# Patient Record
Sex: MALE | Race: WHITE | NOT HISPANIC OR LATINO | Employment: STUDENT | URBAN - METROPOLITAN AREA
[De-identification: names, ages, dates, MRNs, and addresses within clinical notes are randomized per-mention and may not be internally consistent; named-entity substitution may affect disease eponyms.]

---

## 2017-08-01 DIAGNOSIS — R94.5 ABNORMAL RESULTS OF LIVER FUNCTION STUDIES: Primary | ICD-10-CM

## 2017-08-08 ENCOUNTER — TRANSFERRED RECORDS (OUTPATIENT)
Dept: HEALTH INFORMATION MANAGEMENT | Facility: CLINIC | Age: 27
End: 2017-08-08

## 2017-08-11 ENCOUNTER — OFFICE VISIT (OUTPATIENT)
Dept: GASTROENTEROLOGY | Facility: CLINIC | Age: 27
End: 2017-08-11
Attending: INTERNAL MEDICINE
Payer: COMMERCIAL

## 2017-08-11 VITALS
DIASTOLIC BLOOD PRESSURE: 83 MMHG | WEIGHT: 195.3 LBS | BODY MASS INDEX: 24.28 KG/M2 | RESPIRATION RATE: 20 BRPM | TEMPERATURE: 97.9 F | OXYGEN SATURATION: 100 % | HEART RATE: 87 BPM | SYSTOLIC BLOOD PRESSURE: 142 MMHG | HEIGHT: 75 IN

## 2017-08-11 DIAGNOSIS — K52.9 IBD (INFLAMMATORY BOWEL DISEASE): ICD-10-CM

## 2017-08-11 DIAGNOSIS — K83.01 PSC (PRIMARY SCLEROSING CHOLANGITIS) (H): ICD-10-CM

## 2017-08-11 DIAGNOSIS — K52.9 IBD (INFLAMMATORY BOWEL DISEASE): Primary | ICD-10-CM

## 2017-08-11 PROBLEM — K90.0 CELIAC DISEASE: Status: ACTIVE | Noted: 2017-08-11

## 2017-08-11 LAB
ALBUMIN SERPL-MCNC: 3.8 G/DL (ref 3.4–5)
ALP SERPL-CCNC: 361 U/L (ref 40–150)
ALT SERPL W P-5'-P-CCNC: 210 U/L (ref 0–70)
ANION GAP SERPL CALCULATED.3IONS-SCNC: 6 MMOL/L (ref 3–14)
AST SERPL W P-5'-P-CCNC: 64 U/L (ref 0–45)
BILIRUB DIRECT SERPL-MCNC: 0.2 MG/DL (ref 0–0.2)
BILIRUB SERPL-MCNC: 0.8 MG/DL (ref 0.2–1.3)
BUN SERPL-MCNC: 9 MG/DL (ref 7–30)
CALCIUM SERPL-MCNC: 9.4 MG/DL (ref 8.5–10.1)
CHLORIDE SERPL-SCNC: 103 MMOL/L (ref 94–109)
CO2 SERPL-SCNC: 30 MMOL/L (ref 20–32)
CREAT SERPL-MCNC: 1.06 MG/DL (ref 0.66–1.25)
CRP SERPL-MCNC: <2.9 MG/L (ref 0–8)
DEPRECATED CALCIDIOL+CALCIFEROL SERPL-MC: 25 UG/L (ref 20–75)
ERYTHROCYTE [DISTWIDTH] IN BLOOD BY AUTOMATED COUNT: 12.9 % (ref 10–15)
GFR SERPL CREATININE-BSD FRML MDRD: 85 ML/MIN/1.7M2
GLUCOSE SERPL-MCNC: 70 MG/DL (ref 70–99)
HCT VFR BLD AUTO: 45.5 % (ref 40–53)
HGB BLD-MCNC: 14.4 G/DL (ref 13.3–17.7)
INR PPP: 0.99 (ref 0.86–1.14)
MCH RBC QN AUTO: 28.2 PG (ref 26.5–33)
MCHC RBC AUTO-ENTMCNC: 31.6 G/DL (ref 31.5–36.5)
MCV RBC AUTO: 89 FL (ref 78–100)
PLATELET # BLD AUTO: 251 10E9/L (ref 150–450)
POTASSIUM SERPL-SCNC: 3.9 MMOL/L (ref 3.4–5.3)
PROT SERPL-MCNC: 8 G/DL (ref 6.8–8.8)
RBC # BLD AUTO: 5.1 10E12/L (ref 4.4–5.9)
SODIUM SERPL-SCNC: 139 MMOL/L (ref 133–144)
WBC # BLD AUTO: 6.1 10E9/L (ref 4–11)

## 2017-08-11 PROCEDURE — 86140 C-REACTIVE PROTEIN: CPT | Performed by: INTERNAL MEDICINE

## 2017-08-11 PROCEDURE — 80076 HEPATIC FUNCTION PANEL: CPT | Performed by: INTERNAL MEDICINE

## 2017-08-11 PROCEDURE — 36415 COLL VENOUS BLD VENIPUNCTURE: CPT | Performed by: INTERNAL MEDICINE

## 2017-08-11 PROCEDURE — 80048 BASIC METABOLIC PNL TOTAL CA: CPT | Performed by: INTERNAL MEDICINE

## 2017-08-11 PROCEDURE — 85610 PROTHROMBIN TIME: CPT | Performed by: INTERNAL MEDICINE

## 2017-08-11 PROCEDURE — 99212 OFFICE O/P EST SF 10 MIN: CPT | Mod: ZF

## 2017-08-11 PROCEDURE — 82306 VITAMIN D 25 HYDROXY: CPT | Performed by: INTERNAL MEDICINE

## 2017-08-11 PROCEDURE — 85027 COMPLETE CBC AUTOMATED: CPT | Performed by: INTERNAL MEDICINE

## 2017-08-11 ASSESSMENT — PAIN SCALES - GENERAL: PAINLEVEL: NO PAIN (0)

## 2017-08-11 NOTE — MR AVS SNAPSHOT
After Visit Summary   8/11/2017    Uriah Mortensen    MRN: [id number]           Patient Information     Date Of Birth          1990        Visit Information        Provider Department      8/11/2017 8:50 AM Graciela Herrera MD [facility] Hepatology        Today's Diagnoses     IBD (inflammatory bowel disease)    -  1    PSC (primary sclerosing cholangitis)           Follow-ups after your visit        Follow-up notes from your care team     Return in about 1 year (around 8/11/2018).      Your next 10 appointments already scheduled     Aug 11, 2017 10:15 AM CDT   Lab with [facility]   [facility] Lab ([facility])    [address] [place] [address]  1st [facility] [id number] [phone]            Aug 10, 2018  9:10 AM CDT   (Arrive by 8:55 AM)   Return General Liver with Graciela Herrera MD   [facility] Hepatology ([facility])    [address] [place] [address]  3rd [facility] [address] [phone]              Future tests that were ordered for you today     Open Future Orders        Priority Expected Expires Ordered    Vitamin D Deficiency Routine  9/10/2017 8/11/2017    CRP inflammation Routine  9/10/2017 8/11/2017    INR Routine  9/10/2017 8/11/2017    Hepatic panel Routine  9/10/2017 8/11/2017    Basic metabolic panel Routine  9/10/2017 8/11/2017    CBC with platelets Routine  9/10/2017 8/11/2017            Who to contact     If you have questions or need follow up information about today's clinic visit or your schedule please contact [facility] HEPATOLOGY directly at [phone].  Normal or non-critical lab and imaging results will be communicated to you by MyChart, letter or phone within 4 business days after the clinic has received the results. If you do not hear from us within 7 days, please contact the clinic through MyChart or phone. If you have a critical or abnormal lab result, we will notify you by phone as soon as  "possible.  Submit refill requests through MyWishBoard or call your pharmacy and they will forward the refill request to us. Please allow 3 business days for your refill to be completed.          Additional Information About Your Visit        [facility] Information     MyWishBoard gives you secure access to your electronic health record. If you see a primary care provider, you can also send messages to your care team and make appointments. If you have questions, please call your primary care clinic.  If you do not have a primary care provider, please call [phone] and they will assist you.        Care EveryWhere ID     This is your Care EveryWhere ID. This could be used by other organizations to access your [facility] medical records  [id number]        Your Vitals Were     Pulse Temperature Respirations Height Pulse Oximetry BMI (Body Mass Index)    87 97.9  F (36.6  C) (Oral) 20 1.905 m (6' 3\") 100% 24.41 kg/m2       Blood Pressure from Last 3 Encounters:   08/11/17 142/83   08/05/16 133/88    Weight from Last 3 Encounters:   08/11/17 88.6 kg (195 lb 4.8 oz)   08/05/16 83 kg (183 lb)               Primary Care Provider    Physician No Ref-Primary       No address on file        Equal Access to Services     SHEEBA QUEEN : Hadii anthony rosaleso Sodeisiali, waaxda luqadaha, qaybta kaalmada adeegyada, quintin jonas. So [facility] [phone].    ATENCIÓN: Si habla español, tiene a germain disposición servicios gratuitos de asistencia lingüística. Llame al [phone].    We comply with applicable federal civil rights laws and [place] laws. We do not discriminate on the basis of race, color, national origin, age, disability sex, sexual orientation or gender identity.            Thank you!     Thank you for choosing [facility] HEPATOLOGY  for your care. Our goal is always to provide you with excellent care. Hearing back from our patients is one way we can continue to improve our services. Please take a few " minutes to complete the written survey that you may receive in the mail after your visit with us. Thank you!             Your Updated Medication List - Protect others around you: Learn how to safely use, store and throw away your medicines at www.disposemymeds.org.          This list is accurate as of: 8/11/17  9:33 AM.  Always use your most recent med list.                   Brand Name Dispense Instructions for use Diagnosis    ASACOL  MG EC tablet   Generic drug:  mesalamine      Take 1,600 mg by mouth every morning    PSC (primary sclerosing cholangitis)       IBUPROFEN PO      Take 400 mg by mouth as needed for moderate pain (Mainly used for headaches)    PSC (primary sclerosing cholangitis)       OMEPRAZOLE PO      Take 20 mg by mouth every morning    IBD (inflammatory bowel disease), PSC (primary sclerosing cholangitis)       UCERIS 9 MG 24 hr tablet   Generic drug:  budesonide      Take 9 mg by mouth every morning    IBD (inflammatory bowel disease), PSC (primary sclerosing cholangitis)

## 2017-08-11 NOTE — PROGRESS NOTES
"S: 27 y M with PSC diagnosed in 2014. Also has IBD (unclear if UC or CD) and celiac.    PSC dx at [place] through MRI. Never has had requirement for ERCP. No episodes of cholangitis. Alk phos has been up to 800s, currently around 300 and stable. Normal platelet count, normal kidney function.    MRI done 8/8/17 in [place] showed findings c/w PSC and no mass. All I have is a faxed preliminary report and we are told that the radiologist there is on vacation and will not complete report for at least a week. Last imaging prior to that was US in December. He did bring the disk with the MR on it.    IBD has been classified as likely UC. He sees Elsie Ridley at  for this. Last colonoscopy was August 2016 and he is scheduled to have one on Monday. Started Entyvio February. Has had 5 doses. With this, his liver tests have increased (see below). Plan is for next infusion next week and have it every 4 weeks. He is also on budesonide and mesalamine.  DATE AST ALT AP Tbili  7/14 266 178 300 1.1  6/16 133 143 311   4/21 77 92 280 0.7  3/3 79 167 264 0.8  1/27 87 129 243 0.9    He has had no fevers, N/V or abdominal pain. No itching. He has had episodes of diarrhea thought to be related to Entyvio. He has been checked for Cdiff (neg). The diarrhea has abated. He genreally feels well.    Going back to school on August 21.    PHYSICAL EXAMINATION:   /83  Pulse 87  Temp 97.9  F (36.6  C) (Oral)  Resp 20  Ht 1.905 m (6' 3\")  Wt 88.6 kg (195 lb 4.8 oz)  SpO2 100%  BMI 24.41 kg/m2  GENERAL:  Pleasant, in no acute distress.   HEENT:  No icterus, no oral lesions.   LYMPH:  No supraclavicular or cervical lymphadenopathy.   CARDIOVASCULAR:  Regular rate and rhythm.   CHEST:  Lungs are clear.   ABDOMEN:  Bowel sounds are present, soft, nontender, nondistended, no hepatosplenomegaly.   EXTREMITIES:  No edema.   SKIN:  No rash.   NEUROLOGIC:  Speech is fluent and clear.  No asterixis or tremor.     A/P  27 y M with PSC and IBD. " Increasing transaminases with start of Entyvio. Will have him get LFTs today. Will discuss with Dr. Ridley, as I am concerned that the liver test elevations may prohibit continuing with Entyvio. Although his synthetic function remains good, I am still concerned given that he already has underlying liver disease. This could also represent an AIH, but he is on budesonide which can be used to treat AIH and thus would argue against that. If his liver tests remain elevated like this, I would want him to get a liver biopsy just to be sure.     Outside records from  and from [facility] were reviewed with him    This was a 45 minute visit, over 50% counseling and coordination of care.

## 2017-08-11 NOTE — NURSING NOTE
"Chief Complaint   Patient presents with     Allied Health Visit     follown up       Initial /83  Pulse 87  Temp 97.9  F (36.6  C) (Oral)  Resp 20  Ht 1.905 m (6' 3\")  Wt 88.6 kg (195 lb 4.8 oz)  SpO2 100%  BMI 24.41 kg/m2 Estimated body mass index is 24.41 kg/(m^2) as calculated from the following:    Height as of this encounter: 1.905 m (6' 3\").    Weight as of this encounter: 88.6 kg (195 lb 4.8 oz).  Medication Reconciliation: complete      "

## 2017-08-11 NOTE — LETTER
"8/11/2017       RE: Uriah Mortensen  [address] [place] [address]     Dear Colleague,    Thank you for referring your patient, Uriah Mortensen, to the [facility] Please see a copy of my visit note below.    S: 27 y M with PSC diagnosed in 2014. Also has IBD (unclear if UC or CD) and celiac.    PSC dx at [place] through MRI. Never has had requirement for ERCP. No episodes of cholangitis. Alk phos has been up to 800s, currently around 300 and stable. Normal platelet count, normal kidney function.    MRI done 8/8/17 in [place] showed findings c/w PSC and no mass. All I have is a faxed preliminary report and we are told that the radiologist there is on vacation and will not complete report for at least a week. Last imaging prior to that was US in December. He did bring the disk with the MR on it.    IBD has been classified as likely UC. He sees Elsie Ridley at  for this. Last colonoscopy was August 2016 and he is scheduled to have one on Monday. Started Entyvio February. Has had 5 doses. With this, his liver tests have increased (see below). Plan is for next infusion next week and have it every 4 weeks. He is also on budesonide and mesalamine.  DATE AST ALT AP Tbili  7/14 266 178 300 1.1  6/16 133 143 311   4/21 77 92 280 0.7  3/3 79 167 264 0.8  1/27 87 129 243 0.9    He has had no fevers, N/V or abdominal pain. No itching. He has had episodes of diarrhea thought to be related to Entyvio. He has been checked for Cdiff (neg). The diarrhea has abated. He genreally feels well.    Going back to school on August 21.    PHYSICAL EXAMINATION:   /83  Pulse 87  Temp 97.9  F (36.6  C) (Oral)  Resp 20  Ht 1.905 m (6' 3\")  Wt 88.6 kg (195 lb 4.8 oz)  SpO2 100%  BMI 24.41 kg/m2  GENERAL:  Pleasant, in no acute distress.   HEENT:  No icterus, no oral lesions.   LYMPH:  No supraclavicular or cervical lymphadenopathy.   CARDIOVASCULAR:  " Regular rate and rhythm.   CHEST:  Lungs are clear.   ABDOMEN:  Bowel sounds are present, soft, nontender, nondistended, no hepatosplenomegaly.   EXTREMITIES:  No edema.   SKIN:  No rash.   NEUROLOGIC:  Speech is fluent and clear.  No asterixis or tremor.     A/P  27 y M with PSC and IBD. Increasing transaminases with start of Entyvio. Will have him get LFTs today. Will discuss with Dr. Ridley, as I am concerned that the liver test elevations may prohibit continuing with Entyvio. Although his synthetic function remains good, I am still concerned given that he already has underlying liver disease. This could also represent an AIH, but he is on budesonide which can be used to treat AIH and thus would argue against that. If his liver tests remain elevated like this, I would want him to get a liver biopsy just to be sure.     Outside records from  and from [facility] were reviewed with him    This was a 45 minute visit, over 50% counseling and coordination of care.           Again, thank you for allowing me to participate in the care of your patient.      Sincerely,    Graciela Herrera MD

## 2017-08-14 ENCOUNTER — TELEPHONE (OUTPATIENT)
Dept: GASTROENTEROLOGY | Facility: CLINIC | Age: 27
End: 2017-08-14

## 2017-09-07 ENCOUNTER — TELEPHONE (OUTPATIENT)
Dept: GASTROENTEROLOGY | Facility: CLINIC | Age: 27
End: 2017-09-07

## 2017-09-07 DIAGNOSIS — K83.01 PSC (PRIMARY SCLEROSING CHOLANGITIS) (H): Primary | ICD-10-CM

## 2017-09-07 NOTE — TELEPHONE ENCOUNTER
Instructed pt's Dad the need for pt to have LFT's rechecked this week. Writer faxed lab orders to [facility] @ [phone].

## 2017-09-18 ENCOUNTER — TELEPHONE (OUTPATIENT)
Dept: GASTROENTEROLOGY | Facility: CLINIC | Age: 27
End: 2017-09-18

## 2017-09-18 DIAGNOSIS — R74.8 ELEVATED LIVER ENZYMES: Primary | ICD-10-CM

## 2017-09-18 NOTE — TELEPHONE ENCOUNTER
Writer spoke to father, Reece, and instructed the following per Dr. Herrera: Liver tests are still more elevated than I would expect.   Please   1. Get final report for MRCP from his home institution (we only got prelim)   2. Set him up for random, percutaneous liver biopsy for elevated LFTs.  Writer faxed order for liver biopsy to [facility] @ [phone]. Informed by Pooja that it would take two days to place orders in pt's chart. Writer spoke to father and instructed to wait two days then call [phone]. Father was able to repeat instructions.

## 2017-09-29 ENCOUNTER — TRANSFERRED RECORDS (OUTPATIENT)
Dept: HEALTH INFORMATION MANAGEMENT | Facility: CLINIC | Age: 27
End: 2017-09-29

## 2017-10-10 ENCOUNTER — TELEPHONE (OUTPATIENT)
Dept: GASTROENTEROLOGY | Facility: CLINIC | Age: 27
End: 2017-10-10

## 2017-10-10 NOTE — TELEPHONE ENCOUNTER
Requested by Dr. Herrera to request most recent biopsy slides from [facility] ([phone]) to send to Dr. Mayito Marte for review. I spoke to Celsa and she will mail them. Advised pt through my chart.

## 2017-10-17 LAB — COPATH REPORT: NORMAL

## 2017-10-17 PROCEDURE — 00000346 ZZHCL STATISTIC REVIEW OUTSIDE SLIDES TC 88321: Performed by: INTERNAL MEDICINE

## 2017-10-18 ENCOUNTER — MYC MEDICAL ADVICE (OUTPATIENT)
Dept: GASTROENTEROLOGY | Facility: CLINIC | Age: 27
End: 2017-10-18

## 2018-05-30 ENCOUNTER — TRANSFERRED RECORDS (OUTPATIENT)
Dept: HEALTH INFORMATION MANAGEMENT | Facility: CLINIC | Age: 28
End: 2018-05-30

## 2018-06-19 DIAGNOSIS — K83.01 PSC (PRIMARY SCLEROSING CHOLANGITIS) (H): Primary | ICD-10-CM

## 2018-06-27 ENCOUNTER — EXTERNAL ORDER RESULTS (OUTPATIENT)
Dept: GASTROENTEROLOGY | Facility: CLINIC | Age: 28
End: 2018-06-27

## 2018-06-27 LAB
ALBUMIN SERPL-MCNC: 4 G/DL
ALP SERPL-CCNC: 260 U/L (ref 40–150)
ALT SERPL-CCNC: 149 U/L (ref 0–55)
ANION GAP SERPL CALCULATED.3IONS-SCNC: 18 MMOL/L
AST SERPL-CCNC: 160 U/L (ref 5–34)
BILIRUB SERPL-MCNC: 1.6 MG/DL (ref 0.2–1.2)
BILIRUBIN DIRECT: 0 MG/DL
BUN SERPL-MCNC: 8 MG/DL
CALCIUM SERPL-MCNC: 9.2 MG/DL
CHLORIDE SERPLBLD-SCNC: 99 MMOL/L
CO2 SERPL-SCNC: 28 MMOL/L
CREAT SERPL-MCNC: 1 MG/DL
ERYTHROCYTE [DISTWIDTH] IN BLOOD BY AUTOMATED COUNT: 14.6 % (ref 11.6–14.8)
GFR SERPL CREATININE-BSD FRML MDRD: >60 ML/MIN/1.73M2
GLUCOSE SERPL-MCNC: 84 MG/DL (ref 70–99)
HCT VFR BLD AUTO: 42.7 %
HEMOGLOBIN: 13.9 G/DL (ref 13.3–17.7)
INR PPP: 1.12
MCH RBC QN AUTO: 28.4 PG
MCHC RBC AUTO-ENTMCNC: 32.6 G/DL
MCV RBC AUTO: 87.3 FL
PLATELET # BLD AUTO: 249 10^9/L
POTASSIUM SERPL-SCNC: 4.5 MMOL/L
PROT SERPL-MCNC: 7.1 G/DL
PROTHROMBIN TIME: 12.2 SECONDS
RBC # BLD AUTO: 4.89 10^12/L
SODIUM SERPL-SCNC: 140 MMOL/L
WBC # BLD AUTO: 8.4 10^9/L

## 2018-06-28 NOTE — TELEPHONE ENCOUNTER
FUTURE VISIT INFORMATION      FUTURE VISIT INFORMATION:    Date: 7/3/18    Time:     [place] [facility]  REFERRAL INFORMATION:    Referring provider:  Dr. Elsie Ridley    Referring providers clinic:  [facility]    Reason for visit/diagnosis  Ulcerative Colitis    RECORDS STATUS      NOTES STATUS DETAILS   OFFICE NOTE from referring provider Care Everywhere [id number]   OFFICE NOTE from other specialist N/A    DISCHARGE SUMMARY from hospital N/A    OPERATIVE REPORT N/A    MEDICATION LIST Care Everywhere         ENDOSCOPY  Care Everywhere 8/14/17, 8/10/16   COLONOSCOPY Care Everywhere 4/11/18, 8/14/17, 8/10/16, 8/4/15   ERCP N/A    EUS N/A    STOOL TESTING N/A    PERTINENT LABS Care Everywhere    PATHOLOGY REPORTS (RELATED) Care Everywhere 4/11/18   IMAGING (CT, MRI, EGD) Care Everywhere

## 2018-07-02 ENCOUNTER — TELEPHONE (OUTPATIENT)
Dept: GASTROENTEROLOGY | Facility: CLINIC | Age: 28
End: 2018-07-02

## 2018-07-03 ENCOUNTER — OFFICE VISIT (OUTPATIENT)
Dept: GASTROENTEROLOGY | Facility: CLINIC | Age: 28
End: 2018-07-03
Attending: INTERNAL MEDICINE
Payer: COMMERCIAL

## 2018-07-03 ENCOUNTER — PRE VISIT (OUTPATIENT)
Dept: GASTROENTEROLOGY | Facility: CLINIC | Age: 28
End: 2018-07-03

## 2018-07-03 ENCOUNTER — OFFICE VISIT (OUTPATIENT)
Dept: GASTROENTEROLOGY | Facility: CLINIC | Age: 28
End: 2018-07-03
Payer: COMMERCIAL

## 2018-07-03 VITALS
HEART RATE: 64 BPM | SYSTOLIC BLOOD PRESSURE: 128 MMHG | BODY MASS INDEX: 23.58 KG/M2 | DIASTOLIC BLOOD PRESSURE: 77 MMHG | HEIGHT: 76 IN | WEIGHT: 193.6 LBS

## 2018-07-03 VITALS
HEIGHT: 76 IN | SYSTOLIC BLOOD PRESSURE: 126 MMHG | DIASTOLIC BLOOD PRESSURE: 80 MMHG | OXYGEN SATURATION: 97 % | TEMPERATURE: 97.8 F | BODY MASS INDEX: 23.55 KG/M2 | HEART RATE: 68 BPM | WEIGHT: 193.4 LBS | RESPIRATION RATE: 16 BRPM

## 2018-07-03 DIAGNOSIS — K83.01 PSC (PRIMARY SCLEROSING CHOLANGITIS) (H): ICD-10-CM

## 2018-07-03 DIAGNOSIS — K83.01 PSC (PRIMARY SCLEROSING CHOLANGITIS) (H): Primary | ICD-10-CM

## 2018-07-03 DIAGNOSIS — K51.00 ULCERATIVE PANCOLITIS WITHOUT COMPLICATION (H): Primary | ICD-10-CM

## 2018-07-03 DIAGNOSIS — K52.9 IBD (INFLAMMATORY BOWEL DISEASE): ICD-10-CM

## 2018-07-03 LAB
ALBUMIN SERPL-MCNC: 3.7 G/DL (ref 3.4–5)
ALP SERPL-CCNC: 356 U/L (ref 40–150)
ALT SERPL W P-5'-P-CCNC: 67 U/L (ref 0–70)
ANION GAP SERPL CALCULATED.3IONS-SCNC: 6 MMOL/L (ref 3–14)
AST SERPL W P-5'-P-CCNC: 54 U/L (ref 0–45)
BILIRUB DIRECT SERPL-MCNC: 0.1 MG/DL (ref 0–0.2)
BILIRUB SERPL-MCNC: 0.3 MG/DL (ref 0.2–1.3)
BUN SERPL-MCNC: 18 MG/DL (ref 7–30)
CALCIUM SERPL-MCNC: 9.7 MG/DL (ref 8.5–10.1)
CHLORIDE SERPL-SCNC: 104 MMOL/L (ref 94–109)
CO2 SERPL-SCNC: 29 MMOL/L (ref 20–32)
CREAT SERPL-MCNC: 1.06 MG/DL (ref 0.66–1.25)
ERYTHROCYTE [DISTWIDTH] IN BLOOD BY AUTOMATED COUNT: 14.2 % (ref 10–15)
GFR SERPL CREATININE-BSD FRML MDRD: 84 ML/MIN/1.7M2
GLUCOSE SERPL-MCNC: 87 MG/DL (ref 70–99)
HCT VFR BLD AUTO: 43.6 % (ref 40–53)
HGB BLD-MCNC: 14.3 G/DL (ref 13.3–17.7)
INR PPP: 0.95 (ref 0.86–1.14)
MCH RBC QN AUTO: 28.3 PG (ref 26.5–33)
MCHC RBC AUTO-ENTMCNC: 32.8 G/DL (ref 31.5–36.5)
MCV RBC AUTO: 86 FL (ref 78–100)
PLATELET # BLD AUTO: 294 10E9/L (ref 150–450)
POTASSIUM SERPL-SCNC: 4 MMOL/L (ref 3.4–5.3)
PROT SERPL-MCNC: 8 G/DL (ref 6.8–8.8)
RBC # BLD AUTO: 5.05 10E12/L (ref 4.4–5.9)
SODIUM SERPL-SCNC: 139 MMOL/L (ref 133–144)
WBC # BLD AUTO: 5.4 10E9/L (ref 4–11)

## 2018-07-03 PROCEDURE — 85610 PROTHROMBIN TIME: CPT | Performed by: INTERNAL MEDICINE

## 2018-07-03 PROCEDURE — G0463 HOSPITAL OUTPT CLINIC VISIT: HCPCS | Mod: ZF

## 2018-07-03 PROCEDURE — 85027 COMPLETE CBC AUTOMATED: CPT | Performed by: INTERNAL MEDICINE

## 2018-07-03 PROCEDURE — 80048 BASIC METABOLIC PNL TOTAL CA: CPT | Performed by: INTERNAL MEDICINE

## 2018-07-03 PROCEDURE — 80076 HEPATIC FUNCTION PANEL: CPT | Performed by: INTERNAL MEDICINE

## 2018-07-03 RX ORDER — EPINEPHRINE 0.3 MG/.3ML
INJECTION SUBCUTANEOUS
COMMUNITY
Start: 2016-09-14

## 2018-07-03 RX ORDER — MESALAMINE 1.2 G/1
4.8 TABLET, DELAYED RELEASE ORAL DAILY
Qty: 180 TABLET | Refills: 3 | Status: SHIPPED | OUTPATIENT
Start: 2018-07-03 | End: 2019-01-30

## 2018-07-03 RX ORDER — AZATHIOPRINE 50 MG/1
50 TABLET ORAL
COMMUNITY
Start: 2018-04-26 | End: 2018-08-28 | Stop reason: DRUGHIGH

## 2018-07-03 ASSESSMENT — PAIN SCALES - GENERAL
PAINLEVEL: NO PAIN (0)
PAINLEVEL: NO PAIN (0)

## 2018-07-03 NOTE — MR AVS SNAPSHOT
After Visit Summary   7/3/2018    Uriah Mortensen    MRN: [id number]           Patient Information     Date Of Birth          1990        Visit Information        Provider Department      7/3/2018 9:00 AM Graciela Herrera MD [facility] Hepatology        Today's Diagnoses     PSC (primary sclerosing cholangitis)    -  1    IBD (inflammatory bowel disease)           Follow-ups after your visit        Your next 10 appointments already scheduled     Dec 17, 2018 11:00 AM CST   Colonoscopy with Jasen Watkins MD   [facility]    [address]  [address]  [place] [address]   [phone]            Dec 18, 2018  8:00 AM CST   (Arrive by 7:45 AM)   RETURN INFLAMMATORY BOWEL DISEASE with Jasen Watkins MD   [facility] ([facility])    [address]  4th Floor  [place] [id number] [phone]            Dec 18, 2018  9:30 AM CST   (Arrive by 9:15 AM)   Return General Liver with Graciela Herrera MD   [facility] Hepatology ([facility])    [address]  [address]  [place] [id number] [phone]              Future tests that were ordered for you today     Open Future Orders        Priority Expected Expires Ordered    CBC with platelets Routine 8/7/2018 11/3/2018 7/3/2018    INR Routine 8/7/2018 11/3/2018 7/3/2018    Basic metabolic panel Routine 8/7/2018 11/3/2018 7/3/2018    Hepatic panel Routine 8/7/2018 10/2/2018 7/3/2018    MRI Abdomen w & w/o contrast mrcp Routine  8/2/2018 7/3/2018            Who to contact     If you have questions or need follow up information about today's clinic visit or your schedule please contact [facility] HEPATOLOGY directly at [phone].  Normal or non-critical lab and imaging results will be communicated to you by MyChart, letter or phone within 4 business days after the  "clinic has received the results. If you do not hear from us within 7 days, please contact the clinic through Arrayit or phone. If you have a critical or abnormal lab result, we will notify you by phone as soon as possible.  Submit refill requests through Arrayit or call your pharmacy and they will forward the refill request to us. Please allow 3 business days for your refill to be completed.          Additional Information About Your Visit        Arrayit Information     Arrayit gives you secure access to your electronic health record. If you see a primary care provider, you can also send messages to your care team and make appointments. If you have questions, please call your primary care clinic.  If you do not have a primary care provider, please call [phone] and they will assist you.        Care EveryWhere ID     This is your Care EveryWhere ID. This could be used by other organizations to access your [facility] medical records  [id number]        Your Vitals Were     Pulse Height BMI (Body Mass Index)             64 1.94 m (6' 4.38\") 23.33 kg/m2          Blood Pressure from Last 3 Encounters:   07/03/18 128/77   07/03/18 126/80   08/11/17 142/83    Weight from Last 3 Encounters:   07/03/18 87.8 kg (193 lb 9.6 oz)   07/03/18 87.7 kg (193 lb 6.4 oz)   08/11/17 88.6 kg (195 lb 4.8 oz)                 Today's Medication Changes          These changes are accurate as of 7/3/18 10:31 AM.  If you have any questions, ask your nurse or doctor.               These medicines have changed or have updated prescriptions.        Dose/Directions    mesalamine 1.2 g EC tablet   Commonly known as:  LIALDA   This may have changed:    - medication strength  - how much to take  - when to take this   Used for:  Ulcerative pancolitis without complication (H)   Changed by:  Jasen Watkins MD        Dose:  4.8 g   Take 4 tablets (4.8 g) by mouth daily   Quantity:  180 tablet   Refills:  3         Stop taking these medicines if " you haven't already. Please contact your care team if you have questions.     IBUPROFEN PO   Stopped by:  Jasen Watkins MD                Where to get your medicines      These medications were sent to [id number] - [place], [place] - [address] AT [facility] [address]  [address] [place] [address]     Phone:  [phone]     mesalamine 1.2 g EC tablet                Primary Care Provider Office Phone # Fax #    Elsie Ridley [phone]       [facility] [place] [address]  [place] [address]        Equal Access to Services     KAREN QUEEN : Hadii anthony shen hadasho Soomaali, waaxda luqadaha, qaybta kaalmada adeegyada, quintin hahn . So [facility] [phone].    ATENCIÓN: Si habla español, tiene a germain disposición servicios gratuitos de asistencia lingüística. [facility] [phone].    We comply with applicable federal civil rights laws and [place] laws. We do not discriminate on the basis of race, color, national origin, age, disability, sex, sexual orientation, or gender identity.            Thank you!     Thank you for choosing [facility] HEPATOLOGY  for your care. Our goal is always to provide you with excellent care. Hearing back from our patients is one way we can continue to improve our services. Please take a few minutes to complete the written survey that you may receive in the mail after your visit with us. Thank you!             Your Updated Medication List - Protect others around you: Learn how to safely use, store and throw away your medicines at www.disposemymeds.org.          This list is accurate as of 7/3/18 10:31 AM.  Always use your most recent med list.                   Brand Name Dispense Instructions for use Diagnosis    azaTHIOprine 50 MG tablet    IMURAN     Take 50 mg by mouth        EPIPEN 2-NAGI 0.3 MG/0.3ML injection 2-pack   Generic drug:  EPINEPHrine      Inject when needed for emergency  treatment of severe allergic reactions (anaphylaxis).        mesalamine 1.2 g EC tablet    LIALDA    180 tablet    Take 4 tablets (4.8 g) by mouth daily    Ulcerative pancolitis without complication (H)       OMEPRAZOLE PO      Take 20 mg by mouth every morning    IBD (inflammatory bowel disease), PSC (primary sclerosing cholangitis)       UCERIS 9 MG 24 hr tablet   Generic drug:  budesonide      Take 9 mg by mouth every morning    IBD (inflammatory bowel disease), PSC (primary sclerosing cholangitis)

## 2018-07-03 NOTE — PROGRESS NOTES
IBD CLINIC VISIT    CC/REFERRING MD:  Elsie Ridley  REASON FOR CONSULTATION: Ulcerative colitis - PSC    IBD HISTORY  Age at diagnosis: 23  Extent of disease: Pancolitis   Current CD medications:   - Infliximab: Started 2/2018, 5mg/kg q8 weeks  - Azathioprine: started 2018  - Budesonide 3mg daily  - Asacol: 1.6 g a day  Prior IBD surgeries: None  Prior IBD Medications:  - Vedolizumab (8/2016 - 8/2017) stopped due to ongoing inflammation on colonoscopy     DRUG MONITORING  TPMT enzyme activity:     6-TGN/6-MMPN levels:    Biologic concentration:    DISEASE ASSESSMENT  Labs  Recent Labs   Lab Test  08/11/17   1005   CRP  <2.9     Fecal calprotectin:   Endoscopy:   Enterography:   C diff:     ASSESSMENT/PLAN:  This is a 28-year-old male who presents today to establish care in the IBD program for PSC and colitis.    1. Colitis: Most consistent with ulcerative colitis or PSC colitis.  Pancolitis that appears worse on the right, overall quiescent disease.  His symptoms were controlled after diagnosis on mesalamine alone.  It seems that Biologics were added to obtain endoscopic remission.  He continued to have active inflammation on vedolizumab and so was changed to infliximab.  On his current dose of infliximab along with azathioprine he continues to have some ongoing endoscopic inflammation.  Clinically he has noticed no change in his symptoms on vedolizumab or infliximab after without azathioprine.  He has essentially been in clinical remission since starting Asacol.    We spent a long time discussing the natural history of colitis in the setting of PSC.  Particularly the increased risk of colon dysplasia and cancer.  Discussed the role of inflammation plays and dysplasia of the colon.  Discussed the risks and benefits of escalating therapy to treat inflammation in the setting of clinical remission.    Overall I think he would do best with optimizing his mesalamine and anti-TNF therapy, and potentially stopping his  thiopurine.  We will plan to get him on 4.8 g of mesalamine a day and check an infliximab level to determine if this can be optimized.  We will then need to reassess endoscopically.    --  Increase to mesalamine 4.8g a day (can do 6 pills a day while on Asacol, then OK to change to Lialda 4 pills a day).   -- Continue azathioprine 50mg for now, but will consider de-escalating after infliximab optimized  -- Infliximab trough concentration: will see if this was completed through [facility], otherwise will check with next infusion in July  -- Plan to optimize infliximab to trough of 5-10, if ongoing endoscopic inflammation, will aim for > 10.   -- Tentative plan for colonoscopy in December to re-assess mucosal status and perform dysplasia surveillance     2. PSC: Followed by Dr. Herrera of the [facility] center.  I discussed this patient with her today. Unclear if prior liver chemistry elevations were related to vedolizumab, or if this is natural history of his PSC.    -- Continue PSC care through [facility]  -- Yearly imaging per Dr. Herrera     3. Celiac disease: Per patient diagnosed at [facility]. I do not have the path reports at this time. Celiac serologies normal in  records.  Duodenal biopsies from 8/14/17 normal (presumably on gluten free diet)  -- Tentative plan to continue gluten free diet for now.   -- Yearly TTG  -- Obtain pathology from [facility]     4. Eosinophilic esophagitis: Unclear diagnosis at this time. I cannot see any pathology records to support this.  Pathology from distal and mid esophagus in 8/14/17 without evidence of EoE or reflux.   -- Obtain prior EGD pathology     5. Esophageal stenosis: Very mild benign stenosis at 41 cm. Unclear if this represents acid reflux, changes from EoE or congenital narrowing.     Given unclear diagnosis of celiac disease, EoE and stricture, will plan to repeat EGD off of PPI with next colonoscopy.      IBD Health Care Maintenance:    Vaccinations:  All patients on biologics  should avoid live vaccines.    -- Influenza (every year)  -- TdaP (every 10 years)  -- Pneumococcal Pneumonia (once plus booster at 5 years)  -- Yearly assessment for latent Tb (verbal screening and exam, PPD or QuantiFERON-Tb testing)    One time confirmation of immunity or serologies:  -- Hepatitis A (serologies or immunizations)  -- Hepatitis B (serologies or immunizations)  -- Varicella  -- MMR  -- HPV (all aged 18-26)  -- Meningococcal meningitis (all patients at risk for meningitis)  -- Due to the immunosuppression in this patient, I would not advise administration of live vaccines such as varicella/VZV, intranasal influenza, MMR, or yellow fever vaccine (if travelling).      Bone mineral density screening   -- Recommend all patients supplement with calcium and vitamin D    Cancer Screening:  Colon cancer screening:  Given PSC, recommend patient undergo yearly dysplasia surveillance   Next dysplasia screening is recommended as above.    Skin cancer screening: Annual visual exam of skin by dermatologist since patient is immunocompromised    Depression Screening:  -- Over the last month, have you felt down, depressed, or hopeless? --  -- Over the last month, have you felt little interest or pleasure doing things? --    Misc:  -- Avoid tobacco use  -- Avoid NSAIDs as there is potentially a 25% chance of causing an IBD flare    Return to clinic in 4-6 months    Thank you for this consultation.  It was a pleasure to participate in the care of this patient; please contact us with any further questions.     This note was created with voice recognition software, and while reviewed for accuracy, typos may remain.     Jasen Watkins MD   of Medicine  Division of Gastroenterology, Hepatology and Nutrition  [facility]      HPI:   Diagnosed age 23 - developed cramps and diarrhea after eating. Referred for colonoscopy and diagnosed with UC. Started on Asacol at diagnosis (4.2g per day) and his  symptoms really resolved.  Went to [facility] and was also diagnosed with celiac disease and PSC.  Insurance would not cover [facility] treatments so infusions and other recommendations were carried out by [facility].  Colonoscopy in 8/10/16 with pancolitis, he was treated with uceris and recommended to start vedolizumab (8/2016)    He was started on vedolizumab 2016 and continued for a year.  August 2017 on vedolizumab, improved inflammation on colonoscopy, but still with mild inflammation in rectum, sigmoid, descending, and moderate inflammation in ascending and cecum. Also had worsening of ALT and Alk phos in summer of 2017.  Therefore he was changed from Vedolizumab to infliximab.  Started Infliximab in 2/2018. Follow-up colonoscopy in April 2018 demonstrated mild inflammation in the transverse colon to hepatic flexure, this was rated as mild and moderate.  He was given budesonide and plan to have Infliximab level checked.      Currently, having 2-3 stools a day, stools are formed and without blood.  This is his baseline.  No urgency.  No nocturnal stools. No nausea or emesis.  He does note that his celiac disease gets in the way of finding places for him to eat.      He has never been hospitalized for UC nor required prednisone for symptoms of UC.      Due for next Infliximab infusion in July  - later this month.      Dad somewhat concerned about dry mouth.     He is taking Omeprazole, but reports that he does not have heartburn.    TTG-IgA:   4/11/2018: 3  8/14/17: 2    TB Gold: Negative 8/14/2017    ROS:    No fevers or chills  No weight loss  No blurry vision, double vision or change in vision  No sore throat  No lymphadenopathy  No headache, paraesthesias, or weakness in a limb  No shortness of breath or wheezing  No chest pain or pressure  No arthralgias or myalgias  No rashes or skin changes  No odynophagia or dysphagia  No BRBPR, hematochezia, melena  No dysuria, frequency or urgency  No hot/cold intolerance or  polyria  No anxiety or depression    Extra intestinal manifestations of IBD:  No uveitis/episcleritis  No aphthous ulcers   No arthritis   No erythema nodosum/pyoderma gangrenosum.     PERTINENT PAST MEDICAL HISTORY:  Past Medical History:   Diagnosis Date     Celiac disease      PSC (primary sclerosing cholangitis)      Ulcerative colitis ([facility])        PREVIOUS SURGERIES:  No past surgical history on file.    PREVIOUS ENDOSCOPY:  No results found for this or any previous visit.]    ALLERGIES:     Allergies   Allergen Reactions     Fish-Derived Products Hives     Positive skin test to fish and shellfish on 9/14/2016.     Shellfish Allergy Hives, Itching and Shortness Of Breath     Gluten Meal        PERTINENT MEDICATIONS:    Current Outpatient Prescriptions:      azaTHIOprine (IMURAN) 50 MG tablet, Take 50 mg by mouth, Disp: , Rfl:      budesonide (UCERIS) 9 MG 24 hr tablet, Take 9 mg by mouth every morning, Disp: , Rfl:      EPINEPHrine (EPIPEN 2-NAGI) 0.3 MG/0.3ML injection 2-pack, Inject when needed for emergency treatment of severe allergic reactions (anaphylaxis)., Disp: , Rfl:      mesalamine (ASACOL HD) 800 MG EC tablet, Take 1,600 mg by mouth every morning , Disp: , Rfl:      OMEPRAZOLE PO, Take 20 mg by mouth every morning, Disp: , Rfl:     SOCIAL HISTORY:  Social History     Social History     Marital status: Single     Spouse name: N/A     Number of children: N/A     Years of education: N/A     Occupational History     Not on file.     Social History Main Topics     Smoking status: Never Smoker     Smokeless tobacco: Never Used     Alcohol use No      Comment: None     Drug use: No     Sexual activity: Not on file     Other Topics Concern     Not on file     Social History Narrative       FAMILY HISTORY:  Family History   Problem Relation Age of Onset     Hypertension Father      Lupus Paternal Grandmother      Colon Cancer Paternal Grandfather        Past/family/social history reviewed and no  "changes    PHYSICAL EXAMINATION:  Constitutional: aaox3, cooperative, pleasant, not dyspneic/diaphoretic, no acute distress  Vitals reviewed: /80 (BP Location: Left arm, Patient Position: Sitting, Cuff Size: Adult Regular)  Pulse 68  Temp 97.8  F (36.6  C) (Oral)  Resp 16  Ht 1.94 m (6' 4.38\")  Wt 87.7 kg (193 lb 6.4 oz)  SpO2 97%  BMI 23.31 kg/m2  Wt:   Wt Readings from Last 2 Encounters:   07/03/18 87.7 kg (193 lb 6.4 oz)   08/11/17 88.6 kg (195 lb 4.8 oz)      Eyes: Sclera anicteric/injected  Ears/nose/mouth/throat: Normal oropharynx without ulcers or exudate, mucus membranes moist, hearing intact  Neck: supple, thyroid normal size  CV: No edema  Respiratory: Unlabored breathing  Lymph: No axillary, submandibular, supraclavicular or inguinal lymphadenopathy  Abd:  Nondistended, +bs, no hepatosplenomegaly, nontender, no peritoneal signs  Skin: warm, perfused, no jaundice  Psych: Normal affect  MSK: Normal gait      PERTINENT STUDIES:  Most recent CBC:  Recent Labs   Lab Test 06/25/18 08/11/17   1005   WBC  8.4  6.1   HGB  13.9  14.4   HCT  42.7  45.5   PLT  249  251     Most recent hepatic panel:  Recent Labs   Lab Test 06/25/18 09/08/17   ALT  149*  262*   AST  160*  99*     Most recent creatinine:  Recent Labs   Lab Test 06/25/18 08/11/17   1005   CR  1.0  1.06       Answers for HPI/ROS submitted by the patient on 6/19/2018   General Symptoms: No  Skin Symptoms: No  HENT Symptoms: No  EYE SYMPTOMS: No  HEART SYMPTOMS: No  LUNG SYMPTOMS: No  INTESTINAL SYMPTOMS: No  URINARY SYMPTOMS: No  REPRODUCTIVE SYMPTOMS: No  SKELETAL SYMPTOMS: No  BLOOD SYMPTOMS: No  NERVOUS SYSTEM SYMPTOMS: No  MENTAL HEALTH SYMPTOMS: No  PHQ-2 Score: 0    "

## 2018-07-03 NOTE — PROGRESS NOTES
"A/P  28 y M with PSC and UC. Now chronic elevations in liver enzymes with biopsy last year showing just PSC.    Will get MRI in the next few weeks for CCA screening and assessment of PSC.    Will repeat labs in 1 month, including labs relevant to Imuran.    RTC in December, when he sees. Dr. Watkins.   Graciela Herrera MD  Hepatology/Liver Transplant  Medical Director, Liver Transplantation  [facility]  ===================================================================    S: 28 y M with PSC diagnosed in 2014. Also has UC pancolitis and celiac. Saw Dr. Watkins this morning. He is here with his dad today.    He feels well. No fever, no abdominal pain, no diarrhea, no blood in stool.      PSC dx at [place] through MRI. Never has had requirement for ERCP. No episodes of cholangitis. Alk phos has been up to 800s, currently around 300 and stable. Normal platelet count, normal kidney function. LFTs on 6/25/18: , , Tbili 1.6.     Liver biopsy 9/2017 showed PSC, no inflammation, read by Dr. Marte. LFTS were   AST 99 at the time.      MRI done 8/8/17 in [place] showed findings c/w PSC and no mass.      UC He was on vedolizumab from 8/16-8/2017 and it was stopped 2/2 ongoing inflammation on colonoscopy. Dr. Watkins saw him this am and plans to optimize remicade and mesalamine. Uriah was started on Imuran last month by Dr. Ridley and is going to continue on this. He plans to rescope him in December.     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     PHYSICAL EXAMINATION:   /77 (BP Location: Right arm, Patient Position: Sitting, Cuff Size: Adult Regular)  Pulse 64  Ht 1.94 m (6' 4.38\")  Wt 87.8 kg (193 lb 9.6 oz)  BMI 23.33 kg/m2  GENERAL:  Pleasant, in no acute distress.   HEENT:  No icterus, no oral lesions.   LYMPH:  No supraclavicular or cervical " lymphadenopathy.   CARDIOVASCULAR:  Regular rate and rhythm.   CHEST:  Lungs are clear.   ABDOMEN:  Bowel sounds are present, soft, nontender, nondistended, no hepatosplenomegaly.   EXTREMITIES:  No edema.   SKIN:  No rash.   NEUROLOGIC:  Speech is fluent and clear.  No asterixis or tremor.

## 2018-07-03 NOTE — LETTER
7/3/2018       RE: Uriah Mortensen  [address] [place] [address]     Dear Colleague,    Thank you for referring your patient, Uriah Mortensen, to the [facility]STROENTER[facility] IBD [facility]INIC at [facility]. Please see a copy of my visit note below.    IBD CLINIC VISIT    CC/REFERRING MD:  Elsie Ridley  REASON FOR CONSULTATION: Ulcerative colitis - PSC    IBD HISTORY  Age at diagnosis: 23  Extent of disease: Pancolitis   Current CD medications:   - Infliximab: Started 2/2018, 5mg/kg q8 weeks  - Azathioprine: started 2018  - Budesonide 3mg daily  - Asacol: 1.6 g a day  Prior IBD surgeries: None  Prior IBD Medications:  - Vedolizumab (8/2016 - 8/2017) stopped due to ongoing inflammation on colonoscopy     DRUG MONITORING  TPMT enzyme activity:     6-TGN/6-MMPN levels:    Biologic concentration:    DISEASE ASSESSMENT  Labs  Recent Labs   Lab Test  08/11/17   1005   CRP  <2.9     Fecal calprotectin:   Endoscopy:   Enterography:   C diff:     ASSESSMENT/PLAN:  This is a 28-year-old male who presents today to establish care in the IBD program for PSC and colitis.    1. Colitis: Most consistent with ulcerative colitis or PSC colitis.  Pancolitis that appears worse on the right, overall quiescent disease.  His symptoms were controlled after diagnosis on mesalamine alone.  It seems that Biologics were added to obtain endoscopic remission.  He continued to have active inflammation on vedolizumab and so was changed to infliximab.  On his current dose of infliximab along with azathioprine he continues to have some ongoing endoscopic inflammation.  Clinically he has noticed no change in his symptoms on vedolizumab or infliximab after without azathioprine.  He has essentially been in clinical remission since starting Asacol.    We spent a long time discussing the natural history of colitis in the setting of PSC.  Particularly the increased risk of colon dysplasia and  cancer.  Discussed the role of inflammation plays and dysplasia of the colon.  Discussed the risks and benefits of escalating therapy to treat inflammation in the setting of clinical remission.    Overall I think he would do best with optimizing his mesalamine and anti-TNF therapy, and potentially stopping his thiopurine.  We will plan to get him on 4.8 g of mesalamine a day and check an infliximab level to determine if this can be optimized.  We will then need to reassess endoscopically.    --  Increase to mesalamine 4.8g a day (can do 6 pills a day while on Asacol, then OK to change to Lialda 4 pills a day).   -- Continue azathioprine 50mg for now, but will consider de-escalating after infliximab optimized  -- Infliximab trough concentration: will see if this was completed through , otherwise will check with next infusion in July  -- Plan to optimize infliximab to trough of 5-10, if ongoing endoscopic inflammation, will aim for > 10.   -- Tentative plan for colonoscopy in December to re-assess mucosal status and perform dysplasia surveillance     2. PSC: Followed by Dr. Herrera of the Liver center.  I discussed this patient with her today. Unclear if prior liver chemistry elevations were related to vedolizumab, or if this is natural history of his PSC.    -- Continue PSC care through Liver center  -- Yearly imaging per Dr. Herrera     3. Celiac disease: Per patient diagnosed at [place]. I do not have the path reports at this time. Celiac serologies normal in  records.  Duodenal biopsies from 8/14/17 normal (presumably on gluten free diet)  -- Tentative plan to continue gluten free diet for now.   -- Yearly TTG  -- Obtain pathology from [place]     4. Eosinophilic esophagitis: Unclear diagnosis at this time. I cannot see any pathology records to support this.  Pathology from distal and mid esophagus in 8/14/17 without evidence of EoE or reflux.   -- Obtain prior EGD pathology     5. Esophageal stenosis: Very mild  benign stenosis at 41 cm. Unclear if this represents acid reflux, changes from EoE or congenital narrowing.     Given unclear diagnosis of celiac disease, EoE and stricture, will plan to repeat EGD off of PPI with next colonoscopy.      IBD Health Care Maintenance:    Vaccinations:  All patients on biologics should avoid live vaccines.    -- Influenza (every year)  -- TdaP (every 10 years)  -- Pneumococcal Pneumonia (once plus booster at 5 years)  -- Yearly assessment for latent Tb (verbal screening and exam, PPD or QuantiFERON-Tb testing)    One time confirmation of immunity or serologies:  -- Hepatitis A (serologies or immunizations)  -- Hepatitis B (serologies or immunizations)  -- Varicella  -- MMR  -- HPV (all aged 18-26)  -- Meningococcal meningitis (all patients at risk for meningitis)  -- Due to the immunosuppression in this patient, I would not advise administration of live vaccines such as varicella/VZV, intranasal influenza, MMR, or yellow fever vaccine (if travelling).      Bone mineral density screening   -- Recommend all patients supplement with calcium and vitamin D    Cancer Screening:  Colon cancer screening:  Given PSC, recommend patient undergo yearly dysplasia surveillance   Next dysplasia screening is recommended as above.    Skin cancer screening: Annual visual exam of skin by dermatologist since patient is immunocompromised    Depression Screening:  -- Over the last month, have you felt down, depressed, or hopeless? --  -- Over the last month, have you felt little interest or pleasure doing things? --    Misc:  -- Avoid tobacco use  -- Avoid NSAIDs as there is potentially a 25% chance of causing an IBD flare    Return to clinic in 4-6 months    Thank you for this consultation.  It was a pleasure to participate in the care of this patient; please contact us with any further questions.     This note was created with voice recognition software, and while reviewed for accuracy, typos may remain.      Jasen Watkins MD   of Medicine  Division of Gastroenterology, Hepatology and Nutrition  [facility]      HPI:   Diagnosed age 23 - developed cramps and diarrhea after eating. Referred for colonoscopy and diagnosed with UC. Started on Asacol at diagnosis (4.2g per day) and his symptoms really resolved.  Went to [place] and was also diagnosed with celiac disease and PSC.  Insurance would not cover [place] treatments so infusions and other recommendations were carried out by [facility].  Colonoscopy in 8/10/16 with pancolitis, he was treated with uceris and recommended to start vedolizumab (8/2016)    He was started on vedolizumab 2016 and continued for a year.  August 2017 on vedolizumab, improved inflammation on colonoscopy, but still with mild inflammation in rectum, sigmoid, descending, and moderate inflammation in ascending and cecum. Also had worsening of ALT and Alk phos in summer of 2017.  Therefore he was changed from Vedolizumab to infliximab.  Started Infliximab in 2/2018. Follow-up colonoscopy in April 2018 demonstrated mild inflammation in the transverse colon to hepatic flexure, this was rated as mild and moderate.  He was given budesonide and plan to have Infliximab level checked.      Currently, having 2-3 stools a day, stools are formed and without blood.  This is his baseline.  No urgency.  No nocturnal stools. No nausea or emesis.  He does note that his celiac disease gets in the way of finding places for him to eat.      He has never been hospitalized for UC nor required prednisone for symptoms of UC.      Due for next Infliximab infusion in July  - later this month.      Dad somewhat concerned about dry mouth.     He is taking Omeprazole, but reports that he does not have heartburn.    TTG-IgA:   4/11/2018: 3  8/14/17: 2    TB Gold: Negative 8/14/2017    ROS:    No fevers or chills  No weight loss  No blurry vision, double vision or change in vision  No sore  throat  No lymphadenopathy  No headache, paraesthesias, or weakness in a limb  No shortness of breath or wheezing  No chest pain or pressure  No arthralgias or myalgias  No rashes or skin changes  No odynophagia or dysphagia  No BRBPR, hematochezia, melena  No dysuria, frequency or urgency  No hot/cold intolerance or polyria  No anxiety or depression    Extra intestinal manifestations of IBD:  No uveitis/episcleritis  No aphthous ulcers   No arthritis   No erythema nodosum/pyoderma gangrenosum.     PERTINENT PAST MEDICAL HISTORY:  Past Medical History:   Diagnosis Date     Celiac disease      PSC (primary sclerosing cholangitis)      Ulcerative colitis (H)        PREVIOUS SURGERIES:  No past surgical history on file.    PREVIOUS ENDOSCOPY:  No results found for this or any previous visit.]    ALLERGIES:     Allergies   Allergen Reactions     Fish-Derived Products Hives     Positive skin test to fish and shellfish on 9/14/2016.     Shellfish Allergy Hives, Itching and Shortness Of Breath     Gluten Meal        PERTINENT MEDICATIONS:    Current Outpatient Prescriptions:      azaTHIOprine (IMURAN) 50 MG tablet, Take 50 mg by mouth, Disp: , Rfl:      budesonide (UCERIS) 9 MG 24 hr tablet, Take 9 mg by mouth every morning, Disp: , Rfl:      EPINEPHrine (EPIPEN 2-NAGI) 0.3 MG/0.3ML injection 2-pack, Inject when needed for emergency treatment of severe allergic reactions (anaphylaxis)., Disp: , Rfl:      mesalamine (ASACOL HD) 800 MG EC tablet, Take 1,600 mg by mouth every morning , Disp: , Rfl:      OMEPRAZOLE PO, Take 20 mg by mouth every morning, Disp: , Rfl:     SOCIAL HISTORY:  Social History     Social History     Marital status: Single     Spouse name: N/A     Number of children: N/A     Years of education: N/A     Occupational History     Not on file.     Social History Main Topics     Smoking status: Never Smoker     Smokeless tobacco: Never Used     Alcohol use No      Comment: None     Drug use: No     Sexual  "activity: Not on file     Other Topics Concern     Not on file     Social History Narrative       FAMILY HISTORY:  Family History   Problem Relation Age of Onset     Hypertension Father      Lupus Paternal Grandmother      Colon Cancer Paternal Grandfather        Past/family/social history reviewed and no changes    PHYSICAL EXAMINATION:  Constitutional: aaox3, cooperative, pleasant, not dyspneic/diaphoretic, no acute distress  Vitals reviewed: /80 (BP Location: Left arm, Patient Position: Sitting, Cuff Size: Adult Regular)  Pulse 68  Temp 97.8  F (36.6  C) (Oral)  Resp 16  Ht 1.94 m (6' 4.38\")  Wt 87.7 kg (193 lb 6.4 oz)  SpO2 97%  BMI 23.31 kg/m2  Wt:   Wt Readings from Last 2 Encounters:   07/03/18 87.7 kg (193 lb 6.4 oz)   08/11/17 88.6 kg (195 lb 4.8 oz)      Eyes: Sclera anicteric/injected  Ears/nose/mouth/throat: Normal oropharynx without ulcers or exudate, mucus membranes moist, hearing intact  Neck: supple, thyroid normal size  CV: No edema  Respiratory: Unlabored breathing  Lymph: No axillary, submandibular, supraclavicular or inguinal lymphadenopathy  Abd:  Nondistended, +bs, no hepatosplenomegaly, nontender, no peritoneal signs  Skin: warm, perfused, no jaundice  Psych: Normal affect  MSK: Normal gait      PERTINENT STUDIES:  Most recent CBC:  Recent Labs   Lab Test 06/25/18 08/11/17   1005   WBC  8.4  6.1   HGB  13.9  14.4   HCT  42.7  45.5   PLT  249  251     Most recent hepatic panel:  Recent Labs   Lab Test 06/25/18 09/08/17   ALT  149*  262*   AST  160*  99*     Most recent creatinine:  Recent Labs   Lab Test 06/25/18 08/11/17   1005   CR  1.0  1.06           Again, thank you for allowing me to participate in the care of your patient.      Sincerely,    Jasen Watkins MD      "

## 2018-07-03 NOTE — MR AVS SNAPSHOT
After Visit Summary   7/3/2018    Uriah Mortensen    MRN: [id number]           Patient Information     Date Of Birth          1990        Visit Information        Provider Department      7/3/2018 8:00 AM Jasen Watkins MD [facility] IBD [facility]        Today's Diagnoses     Ulcerative pancolitis without complication (H)    -  1      Care Instructions    Nice to meet you today      Continue on Azathioprine at this time    Check on Infliximab level and will potentially increase the dosage      Colonoscopy this to December  then office visit   You are scheduled on December   Check in time 10 am   [facility]   [address] [place] [address]   You will be mailed the instructions  You will receive a call from the pre assessment nurse one week prior to discuss prep and instructions.     IIncrease Asacol to 6 tablets in am or 3  Am and 3 in pm  until completerthen   Change to  Lialda      For questions regarding your care Monday through Friday, contact the RN GI care coordinator,  Call   [phone] option 3 . Your call will be  returned same day, or if consultation is needed with the provider, it may be following business day - or you may send a [employer] Chart message.    For medication refills (prescribed by the GI clinic), contact your pharmacy.    For appointment rescheduling/cancellation, contact [phone]     After hours, or if you have an immediate GI concern and cannot wait for a return call, contact the GI Fellow at [phone] and select option #4.     Thanks Cheyenne Sams RN Care Coordinator for Dr. Watkins  Phone   [phone]                      Follow-ups after your visit        Additional Services     GASTROENTEROLOGY ADULT REF PROCEDURE ONLY [facility] [phone]       Last Lab Result: Creatinine (mg/dL)       Date                     Value                 06/25/2018               1.0              ----------  Body mass index is 23.31  kg/(m^2).     Needed:  No  Language:  English    Patient will be contacted to schedule procedure.     Please be aware that coverage of these services is subject to the terms and limitations of your health insurance plan.  Call member services at your health plan with any benefit or coverage questions.  Any procedures must be performed at a [place] facility OR coordinated by your clinic's referral office.    Please bring the following with you to your appointment:    (1) Any X-Rays, CTs or MRIs which have been performed.  Contact the facility where they were done to arrange for  prior to your scheduled appointment.    (2) List of current medications   (3) This referral request   (4) Any documents/labs given to you for this referral                  Your next 10 appointments already scheduled     Dec 17, 2018 11:00 AM CST   Colonoscopy with Jasen Watkins MD   [facility] ([facility])    [address] [place] [id number] [phone]            Dec 18, 2018  8:00 AM CST   (Arrive by 7:45 AM)   RETURN INFLAMMATORY BOWEL DISEASE with Jasen Watkins MD   [facility] ([facility])    [address] [place] [id number] [phone]              Who to contact     Please call your clinic at [phone] to:    Ask questions about your health    Make or cancel appointments    Discuss your medicines    Learn about your test results    Speak to your doctor            Additional Information About Your Visit        MyChart Information     [employer] gives you secure access to your electronic health record. If you see a primary care provider, you can also send messages to your care team and make appointments. If you have questions, please call your primary care clinic.  If you do not have a primary care provider, please call [phone] and they will assist you.       "[employer] is an electronic gateway that provides easy, online access to your medical records. With [employer], you can request a clinic appointment, read your test results, renew a prescription or communicate with your care team.     To access your existing account, please contact your [facility] or call [phone] for assistance.        Care EveryWhere ID     This is your Care EveryWhere ID. This could be used by other organizations to access your [place] medical records  [id number]        Your Vitals Were     Pulse Temperature Respirations Height Pulse Oximetry BMI (Body Mass Index)    68 97.8  F (36.6  C) (Oral) 16 1.94 m (6' 4.38\") 97% 23.31 kg/m2       Blood Pressure from Last 3 Encounters:   07/03/18 126/80   08/11/17 142/83   08/05/16 133/88    Weight from Last 3 Encounters:   07/03/18 87.7 kg (193 lb 6.4 oz)   08/11/17 88.6 kg (195 lb 4.8 oz)   08/05/16 83 kg (183 lb)              We Performed the Following     GASTROENTEROLOGY ADULT REF PROCEDURE ONLY [facility] [phone]          Today's Medication Changes          These changes are accurate as of 7/3/18  9:21 AM.  If you have any questions, ask your nurse or doctor.               These medicines have changed or have updated prescriptions.        Dose/Directions    mesalamine 1.2 g EC tablet   Commonly known as:  LIALDA   This may have changed:    - medication strength  - how much to take  - when to take this   Used for:  Ulcerative pancolitis without complication (H)   Changed by:  Jasen Watkins MD        Dose:  4.8 g   Take 4 tablets (4.8 g) by mouth daily   Quantity:  180 tablet   Refills:  3         Stop taking these medicines if you haven't already. Please contact your care team if you have questions.     IBUPROFEN PO   Stopped by:  Jasen Watkins MD                Where to get your medicines      These medications were sent to [facility] [id number] [place] [address] - [address] AT " [facility]  [address] [place] [address]     Phone:  [phone]     mesalamine 1.2 g EC tablet                Primary Care Provider Fax #    Physician No Ref-Primary [phone]       No address on file        Equal Access to Services     SHEEBA QUEEN : Hadii aad ku hadmary joo Soomaali, waaxda luqadaha, qaybta kaalmada adeegyada, quintin jonas. So [facility] [phone].    ATENCIÓN: Si habla español, tiene a germain disposición servicios gratuitos de asistencia lingüística. [place] al [phone].    We comply with applicable federal civil rights laws and [place] laws. We do not discriminate on the basis of race, color, national origin, age, disability, sex, sexual orientation, or gender identity.            Thank you!     Thank you for choosing [facility] GASTROENTEROLOGY AND IBD CLINIC  for your care. Our goal is always to provide you with excellent care. Hearing back from our patients is one way we can continue to improve our services. Please take a few minutes to complete the written survey that you may receive in the mail after your visit with us. Thank you!             Your Updated Medication List - Protect others around you: Learn how to safely use, store and throw away your medicines at www.disposemymeds.org.          This list is accurate as of 7/3/18  9:21 AM.  Always use your most recent med list.                   Brand Name Dispense Instructions for use Diagnosis    azaTHIOprine 50 MG tablet    IMURAN     Take 50 mg by mouth        EPIPEN 2-NAGI 0.3 MG/0.3ML injection 2-pack   Generic drug:  EPINEPHrine      Inject when needed for emergency treatment of severe allergic reactions (anaphylaxis).        mesalamine 1.2 g EC tablet    LIALDA    180 tablet    Take 4 tablets (4.8 g) by mouth daily    Ulcerative pancolitis without complication (H)       OMEPRAZOLE PO      Take 20 mg by mouth every morning    IBD (inflammatory bowel disease), PSC (primary sclerosing  cholangitis)       UCERIS 9 MG 24 hr tablet   Generic drug:  budesonide      Take 9 mg by mouth every morning    IBD (inflammatory bowel disease), PSC (primary sclerosing cholangitis)

## 2018-07-03 NOTE — NURSING NOTE
"Chief Complaint   Patient presents with     RECHECK     PSC       /77 (BP Location: Right arm, Patient Position: Sitting, Cuff Size: Adult Regular)  Pulse 64  Ht 1.94 m (6' 4.38\")  Wt 87.8 kg (193 lb 9.6 oz)  BMI 23.33 kg/m2    Anita Rosales [facility]  7/3/2018 9:38 AM      "

## 2018-07-03 NOTE — NURSING NOTE
"Chief Complaint   Patient presents with     Consult     Ulcerative colitis       Vitals:    07/03/18 0757   BP: 126/80   BP Location: Left arm   Patient Position: Sitting   Cuff Size: Adult Regular   Pulse: 68   Resp: 16   Temp: 97.8  F (36.6  C)   TempSrc: Oral   SpO2: 97%   Weight: 193 lb 6.4 oz   Height: 6' 4.38\"       Body mass index is 23.31 kg/(m^2).      Lona Shaver LPN                          "

## 2018-07-03 NOTE — LETTER
"7/3/2018      RE: Uriah Mortensen  [address] [place] [address]       A/P  28 y M with PSC and UC. Now chronic elevations in liver enzymes with biopsy last year showing just PSC.    Will get MRI in the next few weeks for CCA screening and assessment of PSC.    Will repeat labs in 1 month, including labs relevant to Imuran.    RTC in December, when he sees. Dr. Watkins.   Graciela Herrera MD  Hepatology/Liver Transplant  Medical Director, Liver Transplantation  [facility]  ===================================================================    S: 28 y M with PSC diagnosed in 2014. Also has UC pancolitis and celiac. Saw Dr. Watkins this morning. He is here with his dad today.    He feels well. No fever, no abdominal pain, no diarrhea, no blood in stool.      PSC dx at [place] through MRI. Never has had requirement for ERCP. No episodes of cholangitis. Alk phos has been up to 800s, currently around 300 and stable. Normal platelet count, normal kidney function. LFTs on 6/25/18: , , Tbili 1.6.     Liver biopsy 9/2017 showed PSC, no inflammation, read by Dr. Marte. LFTS were   AST 99 at the time.      MRI done 8/8/17 in [place] showed findings c/w PSC and no mass.      UC He was on vedolizumab from 8/16-8/2017 and it was stopped 2/2 ongoing inflammation on colonoscopy. Dr. Watkins saw him this am and plans to optimize remicade and mesalamine. Uriah was started on Imuran last month by Dr. Ridley and is going to continue on this. He plans to rescope him in December.     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     PHYSICAL EXAMINATION:   /77 (BP Location: Right arm, Patient Position: Sitting, Cuff Size: Adult Regular)  Pulse 64  Ht 1.94 m (6' 4.38\")  Wt 87.8 kg (193 lb 9.6 oz)  BMI 23.33 kg/m2  GENERAL:  Pleasant, in no acute distress. "   HEENT:  No icterus, no oral lesions.   LYMPH:  No supraclavicular or cervical lymphadenopathy.   CARDIOVASCULAR:  Regular rate and rhythm.   CHEST:  Lungs are clear.   ABDOMEN:  Bowel sounds are present, soft, nontender, nondistended, no hepatosplenomegaly.   EXTREMITIES:  No edema.   SKIN:  No rash.   NEUROLOGIC:  Speech is fluent and clear.  No asterixis or tremor.          Graciela Herrera MD

## 2018-07-03 NOTE — LETTER
7/3/2018       RE: Uriah Mortensen  [address] [place] [address]     Dear Colleague,    Thank you for referring your patient, Uriah Mortensen, to the [facility] HEPATOLOGY at [facility]. Please see a copy of my visit note below.    A/P  28 y M with PSC and UC. Now chronic elevations in liver enzymes with biopsy last year showing just PSC.    Will get MRI in the next few weeks for CCA screening and assessment of PSC.    Will repeat labs in 1 month, including labs relevant to Imuran.    RTC in December, when he sees. Dr. Watkins.   Graciela Herrera MD  Hepatology/Liver Transplant  Medical Director, Liver Transplantation  [facility]  ===================================================================    S: 28 y M with PSC diagnosed in 2014. Also has UC pancolitis and celiac. Saw Dr. Watkins this morning. He is here with his dad today.    He feels well. No fever, no abdominal pain, no diarrhea, no blood in stool.      PSC dx at [facility] through MRI. Never has had requirement for ERCP. No episodes of cholangitis. Alk phos has been up to 800s, currently around 300 and stable. Normal platelet count, normal kidney function. LFTs on 6/25/18: , , Tbili 1.6.     Liver biopsy 9/2017 showed PSC, no inflammation, read by Dr. Marte. LFTS were   AST 99 at the time.      MRI done 8/8/17 in [place] showed findings c/w PSC and no mass.      UC He was on vedolizumab from 8/16-8/2017 and it was stopped 2/2 ongoing inflammation on colonoscopy. Dr. Watkins saw him this am and plans to optimize remicade and mesalamine. Uriah was started on Imuran last month by Dr. Ridley and is going to continue on this. He plans to rescope him in December.     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     PHYSICAL EXAMINATION:   /77 (BP  "Location: Right arm, Patient Position: Sitting, Cuff Size: Adult Regular)  Pulse 64  Ht 1.94 m (6' 4.38\")  Wt 87.8 kg (193 lb 9.6 oz)  BMI 23.33 kg/m2  GENERAL:  Pleasant, in no acute distress.   HEENT:  No icterus, no oral lesions.   LYMPH:  No supraclavicular or cervical lymphadenopathy.   CARDIOVASCULAR:  Regular rate and rhythm.   CHEST:  Lungs are clear.   ABDOMEN:  Bowel sounds are present, soft, nontender, nondistended, no hepatosplenomegaly.   EXTREMITIES:  No edema.   SKIN:  No rash.   NEUROLOGIC:  Speech is fluent and clear.  No asterixis or tremor.          Again, thank you for allowing me to participate in the care of your patient.      Sincerely,    Graciela Herrera MD      "

## 2018-07-09 ENCOUNTER — CARE COORDINATION (OUTPATIENT)
Dept: GASTROENTEROLOGY | Facility: CLINIC | Age: 28
End: 2018-07-09

## 2018-07-09 NOTE — PROGRESS NOTES
Called pt to have pt discontinue omeprazole.  Will check with Dr. Watkins if needs to have the colonoscopy  sooner as he is having in December.   Will add egd.           [place] - in reviewing his OSH records, I would also like him to have an EGD with the colonoscopy...     I also want to do this off of his Omeprazole.     Can you call him and have him stop the Omeprazole and add on the EGD?     Thanks!

## 2018-07-10 ENCOUNTER — CARE COORDINATION (OUTPATIENT)
Dept: GASTROENTEROLOGY | Facility: CLINIC | Age: 28
End: 2018-07-10

## 2018-07-10 DIAGNOSIS — K51.90 ULCERATIVE COLITIS (H): ICD-10-CM

## 2018-07-10 RX ORDER — DIPHENHYDRAMINE HCL 25 MG
25 CAPSULE ORAL ONCE
Status: CANCELLED
Start: 2018-07-25 | End: 2018-07-25

## 2018-07-10 RX ORDER — ACETAMINOPHEN 325 MG/1
650 TABLET ORAL ONCE
Status: CANCELLED
Start: 2018-07-25 | End: 2018-07-25

## 2018-07-10 NOTE — PROGRESS NOTES
Called [facility] and discussed that pt will be transitioning his remicade and gi to Dr. Jasen Watkins. Therapy plan and standing lab order completed and faxed to . Also called lab and requested copy of infliximab level.  Will fax to our clinic. Pt last infusion May 30

## 2018-07-20 ENCOUNTER — CARE COORDINATION (OUTPATIENT)
Dept: GASTROENTEROLOGY | Facility: CLINIC | Age: 28
End: 2018-07-20

## 2018-07-20 DIAGNOSIS — K51.90 UC (ULCERATIVE COLITIS) (H): ICD-10-CM

## 2018-07-20 DIAGNOSIS — K90.0 CELIAC DISEASE: Primary | ICD-10-CM

## 2018-07-20 NOTE — PROGRESS NOTES
Pt said he received my message but forgot to call back. Pt infusion on Wednesday at 1 pm.  Discussed need to draw infliximab level prior to start of infusion. Pt also aware that he nees to have drawn on the 25 th before his infusion. Called [facility] to discuss  the need to draw infliximab level.  [facility] lab kit sent to [facility].  [phone]   [address] [address]     I would like to get a level through [facility].     The lab where he got it has weird antibodies that I don't trust.       We could get the Remicade level anytime (does not have to be a trough)

## 2018-07-25 DIAGNOSIS — K90.0 CELIAC DISEASE: ICD-10-CM

## 2018-07-25 DIAGNOSIS — K51.90 UC (ULCERATIVE COLITIS) (H): ICD-10-CM

## 2018-07-31 LAB — LAB SCANNED RESULT: NORMAL

## 2018-07-31 NOTE — PROGRESS NOTES
IFX: 0  Antibodies to IFX: > 200  On azathioprine 50mg daily     Stop IFX  Given that it was started for endoscopic inflammation, tentative plan to repeat endoscopy and consider:  1) titrate to weight based azathioprine  2) other anti-TNF

## 2018-08-06 ENCOUNTER — CARE COORDINATION (OUTPATIENT)
Dept: GASTROENTEROLOGY | Facility: CLINIC | Age: 28
End: 2018-08-06

## 2018-08-06 NOTE — PROGRESS NOTES
Called [facility] to cancel remicade.  Discussed with pt that remicade is no longer effective. Set up an overbook appt with Dr. Watkins for an appt to discuss treatment options.  Reminded pt to continue Lialda and azathioprine.

## 2018-08-17 ENCOUNTER — TELEPHONE (OUTPATIENT)
Dept: GASTROENTEROLOGY | Facility: CLINIC | Age: 28
End: 2018-08-17

## 2018-08-17 NOTE — TELEPHONE ENCOUNTER
Spoke to patient's dad, he confirmed appt for patient on 8/20/18 @ 2:40pm with Dr. Watkins in GI clinic. All questions were answered.    Teresa BERGERON LPN

## 2018-08-20 ENCOUNTER — OFFICE VISIT (OUTPATIENT)
Dept: GASTROENTEROLOGY | Facility: CLINIC | Age: 28
End: 2018-08-20
Payer: COMMERCIAL

## 2018-08-20 VITALS
TEMPERATURE: 97.3 F | HEIGHT: 76 IN | SYSTOLIC BLOOD PRESSURE: 133 MMHG | WEIGHT: 195.3 LBS | RESPIRATION RATE: 18 BRPM | BODY MASS INDEX: 23.78 KG/M2 | HEART RATE: 65 BPM | DIASTOLIC BLOOD PRESSURE: 73 MMHG | OXYGEN SATURATION: 95 %

## 2018-08-20 DIAGNOSIS — K51.00 ULCERATIVE PANCOLITIS WITHOUT COMPLICATION (H): ICD-10-CM

## 2018-08-20 DIAGNOSIS — K83.01 PSC (PRIMARY SCLEROSING CHOLANGITIS) (H): ICD-10-CM

## 2018-08-20 DIAGNOSIS — K51.00 ULCERATIVE PANCOLITIS WITHOUT COMPLICATION (H): Primary | ICD-10-CM

## 2018-08-20 DIAGNOSIS — K52.9 IBD (INFLAMMATORY BOWEL DISEASE): ICD-10-CM

## 2018-08-20 LAB
ALBUMIN SERPL-MCNC: 3.9 G/DL (ref 3.4–5)
ALP SERPL-CCNC: 299 U/L (ref 40–150)
ALT SERPL W P-5'-P-CCNC: 144 U/L (ref 0–70)
ANION GAP SERPL CALCULATED.3IONS-SCNC: 5 MMOL/L (ref 3–14)
AST SERPL W P-5'-P-CCNC: 149 U/L (ref 0–45)
BASOPHILS # BLD AUTO: 0 10E9/L (ref 0–0.2)
BASOPHILS NFR BLD AUTO: 0.6 %
BILIRUB DIRECT SERPL-MCNC: 0.3 MG/DL (ref 0–0.2)
BILIRUB SERPL-MCNC: 0.8 MG/DL (ref 0.2–1.3)
BUN SERPL-MCNC: 16 MG/DL (ref 7–30)
CALCIUM SERPL-MCNC: 9.1 MG/DL (ref 8.5–10.1)
CHLORIDE SERPL-SCNC: 104 MMOL/L (ref 94–109)
CO2 SERPL-SCNC: 29 MMOL/L (ref 20–32)
CREAT SERPL-MCNC: 1.12 MG/DL (ref 0.66–1.25)
CRP SERPL-MCNC: <2.9 MG/L (ref 0–8)
DIFFERENTIAL METHOD BLD: NORMAL
EOSINOPHIL # BLD AUTO: 0.3 10E9/L (ref 0–0.7)
EOSINOPHIL NFR BLD AUTO: 4.9 %
ERYTHROCYTE [DISTWIDTH] IN BLOOD BY AUTOMATED COUNT: 13.8 % (ref 10–15)
ERYTHROCYTE [SEDIMENTATION RATE] IN BLOOD BY WESTERGREN METHOD: 8 MM/H (ref 0–15)
GFR SERPL CREATININE-BSD FRML MDRD: 79 ML/MIN/1.7M2
GLUCOSE SERPL-MCNC: 86 MG/DL (ref 70–99)
HCT VFR BLD AUTO: 45.8 % (ref 40–53)
HGB BLD-MCNC: 14.6 G/DL (ref 13.3–17.7)
IMM GRANULOCYTES # BLD: 0 10E9/L (ref 0–0.4)
IMM GRANULOCYTES NFR BLD: 0.2 %
INR PPP: 1.01 (ref 0.86–1.14)
LYMPHOCYTES # BLD AUTO: 1.6 10E9/L (ref 0.8–5.3)
LYMPHOCYTES NFR BLD AUTO: 30 %
MCH RBC QN AUTO: 28.3 PG (ref 26.5–33)
MCHC RBC AUTO-ENTMCNC: 31.9 G/DL (ref 31.5–36.5)
MCV RBC AUTO: 89 FL (ref 78–100)
MONOCYTES # BLD AUTO: 0.6 10E9/L (ref 0–1.3)
MONOCYTES NFR BLD AUTO: 10.3 %
NEUTROPHILS # BLD AUTO: 2.9 10E9/L (ref 1.6–8.3)
NEUTROPHILS NFR BLD AUTO: 54 %
NRBC # BLD AUTO: 0 10*3/UL
NRBC BLD AUTO-RTO: 0 /100
PLATELET # BLD AUTO: 242 10E9/L (ref 150–450)
POTASSIUM SERPL-SCNC: 4.4 MMOL/L (ref 3.4–5.3)
PROT SERPL-MCNC: 8 G/DL (ref 6.8–8.8)
RBC # BLD AUTO: 5.15 10E12/L (ref 4.4–5.9)
SODIUM SERPL-SCNC: 139 MMOL/L (ref 133–144)
WBC # BLD AUTO: 5.3 10E9/L (ref 4–11)

## 2018-08-20 ASSESSMENT — PAIN SCALES - GENERAL: PAINLEVEL: NO PAIN (0)

## 2018-08-20 NOTE — PATIENT INSTRUCTIONS
It was a pleasure taking care of you today.  I've included a brief summary of our discussion and care plan from today's visit below.  Please review this information with your primary care provider.  _______________________________________________________________________    My recommendations are summarized as follows:    --  Blood work today    --  Stay on Lialda 4 pills once a day and azathioprine.     --  Once blood work is back, we will increase your azathioprine to a higher dose (based on blood work).     --  After we increase the azathioprine, we will need to check your blood work every 2 weeks for about 2 months (this can be done through a local lab)    -- Keep EGD and Colonoscopy as scheduled    --  We will see if we can coordinate a nutrition eval with your next visit.     Return to GI Clinic in as scheduled in December.   _______________________________________________________________________    Who do I call with any questions after my visit?  Please be in touch if there are any further questions that arise following today's visit.  There are multiple ways to contact your gastroenterology care team.        During business hours, you may reach a Gastroenterology nurse at [phone].      To schedule or reschedule an appointment, please call [phone].       You can always send a secure message through [employer].  BitPass messages are answered by your nurse or doctor typically within 24 hours.  Please allow extra time on weekends and holidays.        For urgent/emergent questions after business hours, you may reach the on-call GI Fellow by contacting the [facility] at [phone].     How will I get the results of any tests ordered?    You will receive all of your results.  If you have signed up for [employer]Pass, any tests ordered at your visit will be available to you after your physician reviews them.  Typically this takes 1-2 weeks.  If there are urgent results that require a change in  your care plan, your physician or nurse will call you to discuss the next steps.      What is [employer]?  [employer]wd[employer] is a secure way for you to access all of your healthcare records from the [facility].  It is a web based computer program, so you can sign on to it from any location.  It also allows you to send secure messages to your care team.  I recommend signing up for Fwd[employer] Power access if you have not already done so and are comfortable with using a computer.      How to I schedule a follow-up visit?  If you did not schedule a follow-up visit today, please call [phone] to schedule a follow-up office visit.        Sincerely,    Jasen Watkins MD    [facility]  Division of Gastroenterology

## 2018-08-20 NOTE — PROGRESS NOTES
IBD CLINIC VISIT     CC/REFERRING MD:  Elsie Ridley  REASON FOR CONSULTATION: Ulcerative colitis-PSC    IBD HISTORY  Age at diagnosis: 23  Extent of disease: Pancolitis   Current CD medications:   - Azathioprine 50 mg daily: started 2018  - Lialda 4.8g every day   Prior IBD surgeries: None  Prior IBD Medications:  - Vedolizumab (8/2016 - 8/2017) stopped due to ongoing inflammation on colonoscopy (not clear if drug/Ab levels were checked)  - Infliximab-developed antibodies  - Budesonide  - Asacol     DRUG MONITORING  TPMT enzyme activity: Unable to locate    6-TGN/6-MMPN levels: NA    Biologic concentration:  Infliximab 7/25/18-drug level 0, antibody greater than 200    DISEASE ASSESSMENT  Labs:  Recent Labs   Lab Test  08/11/17   1005   CRP  <2.9     Endoscopic assessment: Colonoscopy 4/11/18-granularity and loss of vascularity found in a continuous of circumferential pattern from the transverse colon to the hepatic flexure-mild.  Altered vascularity, erythema, granularity, mucus and shallow ulcerations from rectum to splenic flexure?.  TI appeared normal  Enterography: NA  Fecal calprotectin: In a  C diff: NA  Woody score: 0/9 8/20/2018     ASSESSMENT/PLAN  28-year-old man with PSC colitis on azathioprine and Lialda who presents for follow-up.    1. Colitis- likely PSC colitis- appears to be in clinical remission, however has had discordant endoscopic and clinical disease.  Given his higher risk for colorectal cancer, we favor escalating immunosuppression (azathioprine) and reassessing for mucosal healing.  If he then has ongoing evidence of active disease, we would discuss escalation to biologic therapy (first choice adalimumab).    - hepatic panel, CBC, CRP, ESR, TPMT activity  - Escalation to weight-based thiopurine dosing if normal TPMT activity, with thiopurine metabolite levels after ~4 weeks and thiopurine monitoring labs  - continue lialda 4.8g every day  - colonoscopy as previously scheduled  (December)    2. PSC: Followed by Dr. Herrera of the Liver center.   -- Continue PSC care by Dr Herrera through [facility]      3. Celiac disease: Per patient diagnosed at [facility]. I do not have the path reports at this time. Celiac serologies normal in [facility] records.  Duodenal biopsies from 8/14/17 normal (presumably on gluten free diet)  -- Tentative plan to continue gluten free diet for now.   -- Yearly TTG  -- Obtain pathology from [facility]      4. Eosinophilic esophagitis: Unclear diagnosis at this time. I cannot see any pathology records to support this.  Pathology from distal and mid esophagus in 8/14/17 without evidence of EoE or reflux.   -- Obtain prior EGD pathology      5. Esophageal stenosis: Very mild benign stenosis at 41 cm. Unclear if this represents acid reflux, changes from EoE or congenital narrowing.      Given unclear diagnosis of celiac disease, EoE and stricture, will plan to repeat EGD off of PPI with next colonoscopy.      IBD Health Care Maintenance:    Vaccinations:  All patients on biologics should avoid live vaccines.    -- Influenza (every year)  -- TdaP (every 10 years)  -- Pneumococcal Pneumonia (once plus booster at 5 years)  -- Yearly assessment for latent Tb (verbal screening and exam, PPD or QuantiFERON-Tb testing)    One time confirmation of immunity or serologies:  -- Hepatitis A (serologies or immunizations)  -- Hepatitis B (serologies or immunizations)  -- Varicella  -- MMR  -- HPV (all aged 18-26)  -- Meningococcal meningitis (all patients at risk for meningitis)    Bone mineral density screening   -- Recommend all patients supplement with calcium and vitamin D    Cancer Screening:  Colon cancer screening:  Given PSC colitis, recommend patient undergo regular yearly dysplasia surveillance   Next dysplasia screening is recommended December 2018.    Skin cancer screening: Annual visual exam of skin by dermatologist since patient is immunocompromised    Misc:  -- Avoid tobacco  use  -- Avoid NSAIDs as there is potentially a 25% chance of causing an IBD flare    Return to clinic in 5 months as previously scheduled    Thank you for this consultation.  It was a pleasure to participate in the care of this patient; please contact us with any further questions.     Patient was seen and discussed with attending, Dr. Watkins.    Blayne Fink MD  GI Fellow  [phone]     HPI:   Currently, doing well with his normal 2-3 brown, formed bowel movements per day with no melena or hematochezia.  He denies abdominal pain.  No fever, chills, weight change, rash, joint pain, uveitis, aphthous ulcers.  He has been off budesonide since mid July.  He continues azathioprine 50 mg per day and mesalamine 4.8 g per day without any side effects.  He denies NSAID use.  He denies smoking.    ROS:    No fevers or chills  No weight loss  No blurry vision, double vision or change in vision  No sore throat  No lymphadenopathy  No headache, paraesthesias, or weakness in a limb  No shortness of breath or wheezing  No chest pain or pressure  No arthralgias or myalgias  No rashes or skin changes  No odynophagia or dysphagia  No BRBPR, hematochezia, melena  No dysuria, frequency or urgency  No hot/cold intolerance  No anxiety or depression    Extra intestinal manifestations of IBD:  No uveitis/episcleritis  No aphthous ulcers   No arthritis   No erythema nodosum/pyoderma gangrenosum.     PERTINENT PAST MEDICAL HISTORY:  Past Medical History:   Diagnosis Date     Celiac disease      PSC (primary sclerosing cholangitis)      Ulcerative colitis ([facility])      PREVIOUS SURGERIES:  No past surgical history on file.    PREVIOUS ENDOSCOPY:  Colonoscopy 4/11/18-granularity and loss of vascularity found in a continuous of circumferential pattern from the transverse colon to the hepatic flexure-mild.  Altered vascularity, erythema, granularity, mucus and shallow ulcerations from rectum to splenic flexure?.  TI appeared normal    Pathology  8/14/17  Final Pathologic Diagnosis   A. Terminal ileum, biopsy -- Small bowel mucosa with no significant   pathologic abnormality.   B. Colon, cecum and ascending, biopsies --       1.   Focal mildly active chronic colitis and pseudopolyp.         2.  No dysplasia seen.   C. Colon, transverse, biopsies --       1.  Marked architectural disorder and pseudopolyp with minimal   active colitis seen.       2.  No dysplasia seen.    D. Colon, descending, biopsy --       1.  Chronic colitis with focal mild activity.       2.  No dysplasia seen.   E. Colon, sigmoid, biopsy --       1.  Chronic colitis with mild activity.       2.  No dysplasia seen.   F. Colon, rectum, biopsy --       1.  Chronic colitis with minimal activity.       2.  No dysplasia seen.   G. Duodenum, biopsy --  Small bowel mucosa with no significant   pathologic abnormality.              H. Stomach, body and antrum, biopsy --        1.  Changes consistent with reactive gastropathy       2.  Negative for H. pylori   I. Esophagus, lower, biopsy --       1.   Squamous mucosa with nonspecific reactive changes.        2.   No eosinophilia or goblet cell metaplasia is seen.   J. Esophagus, middle, biopsy -- squamous mucosa with no significant   pathologic abnormality.     ALLERGIES:   Allergies   Allergen Reactions     Fish-Derived Products Hives     Positive skin test to fish and shellfish on 9/14/2016.     Shellfish Allergy Hives, Itching and Shortness Of Breath     Gluten Meal      PERTINENT MEDICATIONS:    Current Outpatient Prescriptions:      azaTHIOprine (IMURAN) 50 MG tablet, Take 50 mg by mouth, Disp: , Rfl:      EPINEPHrine (EPIPEN 2-NAGI) 0.3 MG/0.3ML injection 2-pack, Inject when needed for emergency treatment of severe allergic reactions (anaphylaxis)., Disp: , Rfl:      mesalamine (LIALDA) 1.2 g EC tablet, Take 4 tablets (4.8 g) by mouth daily, Disp: 180 tablet, Rfl: 3    SOCIAL HISTORY:  Social History     Social History     Marital status:  "Single     Spouse name: N/A     Number of children: N/A     Years of education: N/A     Occupational History     Not on file.     Social History Main Topics     Smoking status: Never Smoker     Smokeless tobacco: Never Used     Alcohol use No      Comment: None     Drug use: No     Sexual activity: Not on file     Other Topics Concern     Not on file     Social History Narrative     FAMILY HISTORY:  Family History   Problem Relation Age of Onset     Hypertension Father      Lupus Paternal Grandmother      Colon Cancer Paternal Grandfather      PHYSICAL EXAMINATION:  Constitutional: aaox3, cooperative, pleasant, not dyspneic/diaphoretic, no acute distress  Vitals reviewed: /73 (BP Location: Left arm, Patient Position: Sitting, Cuff Size: Adult Regular)  Pulse 65  Temp 97.3  F (36.3  C) (Oral)  Resp 18  Ht 1.93 m (6' 4\")  Wt 88.6 kg (195 lb 4.8 oz)  SpO2 95%  BMI 23.77 kg/m2  Wt:   Wt Readings from Last 2 Encounters:   08/20/18 88.6 kg (195 lb 4.8 oz)   07/03/18 87.8 kg (193 lb 9.6 oz)      Eyes: Sclera anicteric/injected  Ears/nose/mouth/throat: Normal oropharynx without ulcers or exudate, mucus membranes moist, hearing intact  Neck: supple, thyroid normal size  CV: No edema  Respiratory: Unlabored breathing  Lymph: No cervical lymphadenopathy  Abd: Nondistended, +bs, no hepatosplenomegaly, nontender, no peritoneal signs  Skin: warm, perfused, no jaundice  Psych: Normal affect  MSK: Normal gait    PERTINENT STUDIES:  Most recent CBC:  Recent Labs   Lab Test  07/03/18   1046 06/25/18   WBC  5.4  8.4   HGB  14.3  13.9   HCT  43.6  42.7   PLT  294  249     Most recent hepatic panel:  Recent Labs   Lab Test  07/03/18   1046 06/25/18   ALT  67  149*   AST  54*  160*     Most recent creatinine:  Recent Labs   Lab Test  07/03/18 1046 06/25/18   CR  1.06  1.0     Answers for HPI/ROS submitted by the patient on 8/18/2018   General Symptoms: No  Skin Symptoms: No  HENT Symptoms: No  EYE SYMPTOMS: No  HEART " SYMPTOMS: No  LUNG SYMPTOMS: No  INTESTINAL SYMPTOMS: No  URINARY SYMPTOMS: No  REPRODUCTIVE SYMPTOMS: No  SKELETAL SYMPTOMS: No  BLOOD SYMPTOMS: No  NERVOUS SYSTEM SYMPTOMS: No  MENTAL HEALTH SYMPTOMS: No

## 2018-08-20 NOTE — MR AVS SNAPSHOT
After Visit Summary   8/20/2018    Uriah Mortensen    MRN: [id number]           Patient Information     Date Of Birth          1990        Visit Information        Provider Department      8/20/2018 2:40 PM Jasen Watkins MD [facility]        Today's Diagnoses     Ulcerative pancolitis without complication (H)    -  1      Care Instructions    It was a pleasure taking care of you today.  I've included a brief summary of our discussion and care plan from today's visit below.  Please review this information with your primary care provider.  _______________________________________________________________________    My recommendations are summarized as follows:    --  Blood work today    --  Stay on Lialda 4 pills once a day and azathioprine.     --  Once blood work is back, we will increase your azathioprine to a higher dose (based on blood work).     --  After we increase the azathioprine, we will need to check your blood work every 2 weeks for about 2 months (this can be done through a local lab)    -- Keep EGD and Colonoscopy as scheduled    --  We will see if we can coordinate a nutrition eval with your next visit.     Return to GI Clinic in as scheduled in December.   _______________________________________________________________________    Who do I call with any questions after my visit?  Please be in touch if there are any further questions that arise following today's visit.  There are multiple ways to contact your gastroenterology care team.        During business hours, you may reach a Gastroenterology nurse at [phone].      To schedule or reschedule an appointment, please call [phone].       You can always send a secure message through Feed.fm.  Feed.fm messages are answered by your nurse or doctor typically within 24 hours.  Please allow extra time on weekends and holidays.        For urgent/emergent questions after business hours, you may  reach the on-call GI Fellow by contacting the [facility]  at [phone].     How will I get the results of any tests ordered?    You will receive all of your results.  If you have signed up for One Codex[employer], any tests ordered at your visit will be available to you after your physician reviews them.  Typically this takes 1-2 weeks.  If there are urgent results that require a change in your care plan, your physician or nurse will call you to discuss the next steps.      What is [employer]?  Reamaze is a secure way for you to access all of your healthcare records from the [facility].  It is a web based computer program, so you can sign on to it from any location.  It also allows you to send secure messages to your care team.  I recommend signing up for Reamaze access if you have not already done so and are comfortable with using a computer.      How to I schedule a follow-up visit?  If you did not schedule a follow-up visit today, please call [phone] to schedule a follow-up office visit.        Sincerely,    Jasen Watkins MD    [facility]  Division of Gastroenterology                Follow-ups after your visit        Your next 10 appointments already scheduled     Sep 24, 2018  3:00 PM CDT   (Arrive by 2:45 PM)   RETURN INFLAMMATORY BOWEL DISEASE with Jasen Watkins MD   [facility] ([facility])    [address] [place] [id number] [phone]            Dec 17, 2018 10:30 AM CST   Colonoscopy with Jasen Watkins MD   [facility] ([facility])    [address] [place] [address] [address]  [facility] [id number] [phone]            Dec 18, 2018  8:00 AM CST   (Arrive by 7:45 AM)   RETURN INFLAMMATORY BOWEL DISEASE with Jasen Watkins MD   [facility] ([facility])    [address] [place] [address]  "[address]  4th Floor  [facility] [id number] [phone]            Dec 18, 2018  9:30 AM CST   (Arrive by 9:15 AM)   Return General Liver with Graciela Herrera MD   [facility] ([facility])    [address]  [address]  [facility] [id number] [phone]              Future tests that were ordered for you today     Open Future Orders        Priority Expected Expires Ordered    Hepatic panel Routine  8/20/2019 8/20/2018    CRP inflammation Routine 8/20/2018 9/19/2018 8/20/2018    Erythrocyte sedimentation rate auto Routine 8/20/2018 9/19/2018 8/20/2018            Who to contact     Please call your clinic at [phone] to:    Ask questions about your health    Make or cancel appointments    Discuss your medicines    Learn about your test results    Speak to your doctor            Additional Information About Your Visit        Sa[employer] Information     [employer] gives you secure access to your electronic health record. If you see a primary care provider, you can also send messages to your care team and make appointments. If you have questions, please call your primary care clinic.  If you do not have a primary care provider, please call [phone] and they will assist you.      [employer] is an electronic gateway that provides easy, online access to your medical records. With [employer], you can request a clinic appointment, read your test results, renew a prescription or communicate with your care team.     To access your existing account, please contact your [facility] or call [phone] for assistance.        Care EveryWhere ID     This is your Care EveryWhere ID. This could be used by other organizations to access your [place] medical records  [id number]        Your Vitals Were     Pulse Temperature Respirations Height Pulse Oximetry BMI (Body Mass Index)    65 97.3  F (36.3  C) (Oral) 18 1.93 m (6' 4\") 95% 23.77 kg/m2    "    Blood Pressure from Last 3 Encounters:   08/20/18 133/73   07/03/18 128/77   07/03/18 126/80    Weight from Last 3 Encounters:   08/20/18 88.6 kg (195 lb 4.8 oz)   07/03/18 87.8 kg (193 lb 9.6 oz)   07/03/18 87.7 kg (193 lb 6.4 oz)              We Performed the Following     Thiopurine Methyltransferase RBC        Primary Care Provider Office Phone # Fax #    Elsie Ridley [phone]       [facility] [address]  [place] [address]        Equal Access to Services     [facility] : Hadii aad ac Morales, waaxda luluisanaadaha, qaybta kaalmada vanessa, quintin hahn . So [facility] [phone].    ATENCIÓN: Si habla español, tiene a germain disposición servicios gratuitos de asistencia lingüística. [facility] [phone].    We comply with applicable federal civil rights laws and [place] laws. We do not discriminate on the basis of race, color, national origin, age, disability, sex, sexual orientation, or gender identity.            Thank you!     Thank you for choosing [facility]  for your care. Our goal is always to provide you with excellent care. Hearing back from our patients is one way we can continue to improve our services. Please take a few minutes to complete the written survey that you may receive in the mail after your visit with us. Thank you!             Your Updated Medication List - Protect others around you: Learn how to safely use, store and throw away your medicines at www.disposemymeds.org.          This list is accurate as of 8/20/18  3:52 PM.  Always use your most recent med list.                   Brand Name Dispense Instructions for use Diagnosis    azaTHIOprine 50 MG tablet    IMURAN     Take 50 mg by mouth        EPIPEN 2-NAGI 0.3 MG/0.3ML injection 2-pack   Generic drug:  EPINEPHrine      Inject when needed for emergency treatment of severe allergic reactions (anaphylaxis).        mesalamine 1.2 g  EC tablet    LIALDA    180 tablet    Take 4 tablets (4.8 g) by mouth daily    Ulcerative pancolitis without complication (H)

## 2018-08-20 NOTE — NURSING NOTE
"No chief complaint on file.      Vitals:    08/20/18 1353   BP: 133/73   BP Location: Left arm   Patient Position: Sitting   Cuff Size: Adult Regular   Pulse: 65   Resp: 18   Temp: 97.3  F (36.3  C)   TempSrc: Oral   SpO2: 95%   Weight: 195 lb 4.8 oz   Height: 6' 4\"       Body mass index is 23.77 kg/(m^2).      SANNA Merrill, EMT                      "

## 2018-08-20 NOTE — LETTER
8/20/2018       RE: Uriah Mortensen  [address] [place] [address]     Dear Colleague,    Thank you for referring your patient, Uriah Mortensen, to the [facility] at [facility]. Please see a copy of my visit note below.    IBD CLINIC VISIT     CC/REFERRING MD:  Elsie Ridley  REASON FOR CONSULTATION: Ulcerative colitis-PSC    IBD HISTORY  Age at diagnosis: 23  Extent of disease: Pancolitis   Current CD medications:   - Azathioprine 50 mg daily: started 2018  - Lialda 4.8g every day   Prior IBD surgeries: None  Prior IBD Medications:  - Vedolizumab (8/2016 - 8/2017) stopped due to ongoing inflammation on colonoscopy (not clear if drug/Ab levels were checked)  - Infliximab-developed antibodies  - Budesonide  - Asacol     DRUG MONITORING  TPMT enzyme activity: Unable to locate    6-TGN/6-MMPN levels: NA    Biologic concentration:  Infliximab 7/25/18-drug level 0, antibody greater than 200    DISEASE ASSESSMENT  Labs:  Recent Labs   Lab Test  08/11/17   1005   CRP  <2.9     Endoscopic assessment: Colonoscopy 4/11/18-granularity and loss of vascularity found in a continuous of circumferential pattern from the transverse colon to the hepatic flexure-mild.  Altered vascularity, erythema, granularity, mucus and shallow ulcerations from rectum to splenic flexure?.  TI appeared normal  Enterography: NA  Fecal calprotectin: In a  C diff: NA  Woody score: 0/9 8/20/2018     ASSESSMENT/PLAN  28-year-old man with PSC colitis on azathioprine and Lialda who presents for follow-up.    1. Colitis- likely PSC colitis- appears to be in clinical remission, however has had discordant endoscopic and clinical disease.  Given his higher risk for colorectal cancer, we favor escalating immunosuppression (azathioprine) and reassessing for mucosal healing.  If he then has ongoing evidence of active disease, we would discuss escalation to biologic therapy  (first choice adalimumab).    - hepatic panel, CBC, CRP, ESR, TPMT activity  - Escalation to weight-based thiopurine dosing if normal TPMT activity, with thiopurine metabolite levels after ~4 weeks and thiopurine monitoring labs  - continue lialda 4.8g every day  - colonoscopy as previously scheduled (December)    2. PSC: Followed by Dr. Herrera of the Liver center.   -- Continue PSC care by Dr Herrera through [facility] center      3. Celiac disease: Per patient diagnosed at [place]. I do not have the path reports at this time. Celiac serologies normal in [facility] records.  Duodenal biopsies from 8/14/17 normal (presumably on gluten free diet)  -- Tentative plan to continue gluten free diet for now.   -- Yearly TTG  -- Obtain pathology from [place]      4. Eosinophilic esophagitis: Unclear diagnosis at this time. I cannot see any pathology records to support this.  Pathology from distal and mid esophagus in 8/14/17 without evidence of EoE or reflux.   -- Obtain prior EGD pathology      5. Esophageal stenosis: Very mild benign stenosis at 41 cm. Unclear if this represents acid reflux, changes from EoE or congenital narrowing.      Given unclear diagnosis of celiac disease, EoE and stricture, will plan to repeat EGD off of PPI with next colonoscopy.      IBD Health Care Maintenance:    Vaccinations:  All patients on biologics should avoid live vaccines.    -- Influenza (every year)  -- TdaP (every 10 years)  -- Pneumococcal Pneumonia (once plus booster at 5 years)  -- Yearly assessment for latent Tb (verbal screening and exam, PPD or QuantiFERON-Tb testing)    One time confirmation of immunity or serologies:  -- Hepatitis A (serologies or immunizations)  -- Hepatitis B (serologies or immunizations)  -- Varicella  -- MMR  -- HPV (all aged 18-26)  -- Meningococcal meningitis (all patients at risk for meningitis)    Bone mineral density screening   -- Recommend all patients supplement with calcium and vitamin D    Cancer  Screening:  Colon cancer screening:  Given PSC colitis, recommend patient undergo regular yearly dysplasia surveillance   Next dysplasia screening is recommended December 2018.    Skin cancer screening: Annual visual exam of skin by dermatologist since patient is immunocompromised    Misc:  -- Avoid tobacco use  -- Avoid NSAIDs as there is potentially a 25% chance of causing an IBD flare    Return to clinic in 5 months as previously scheduled    Thank you for this consultation.  It was a pleasure to participate in the care of this patient; please contact us with any further questions.     Patient was seen and discussed with attending, Dr. Watkins.    Blayne Fink MD  GI Fellow  [phone]     HPI:   Currently, doing well with his normal 2-3 brown, formed bowel movements per day with no melena or hematochezia.  He denies abdominal pain.  No fever, chills, weight change, rash, joint pain, uveitis, aphthous ulcers.  He has been off budesonide since mid July.  He continues azathioprine 50 mg per day and mesalamine 4.8 g per day without any side effects.  He denies NSAID use.  He denies smoking.    ROS:    No fevers or chills  No weight loss  No blurry vision, double vision or change in vision  No sore throat  No lymphadenopathy  No headache, paraesthesias, or weakness in a limb  No shortness of breath or wheezing  No chest pain or pressure  No arthralgias or myalgias  No rashes or skin changes  No odynophagia or dysphagia  No BRBPR, hematochezia, melena  No dysuria, frequency or urgency  No hot/cold intolerance  No anxiety or depression    Extra intestinal manifestations of IBD:  No uveitis/episcleritis  No aphthous ulcers   No arthritis   No erythema nodosum/pyoderma gangrenosum.     PERTINENT PAST MEDICAL HISTORY:  Past Medical History:   Diagnosis Date     Celiac disease      PSC (primary sclerosing cholangitis)      Ulcerative colitis (H)      PREVIOUS SURGERIES:  No past surgical history on file.    PREVIOUS  ENDOSCOPY:  Colonoscopy 4/11/18-granularity and loss of vascularity found in a continuous of circumferential pattern from the transverse colon to the hepatic flexure-mild.  Altered vascularity, erythema, granularity, mucus and shallow ulcerations from rectum to splenic flexure?.  TI appeared normal    Pathology 8/14/17  Final Pathologic Diagnosis   A. Terminal ileum, biopsy -- Small bowel mucosa with no significant   pathologic abnormality.   B. Colon, cecum and ascending, biopsies --       1.   Focal mildly active chronic colitis and pseudopolyp.         2.  No dysplasia seen.   C. Colon, transverse, biopsies --       1.  Marked architectural disorder and pseudopolyp with minimal   active colitis seen.       2.  No dysplasia seen.    D. Colon, descending, biopsy --       1.  Chronic colitis with focal mild activity.       2.  No dysplasia seen.   E. Colon, sigmoid, biopsy --       1.  Chronic colitis with mild activity.       2.  No dysplasia seen.   F. Colon, rectum, biopsy --       1.  Chronic colitis with minimal activity.       2.  No dysplasia seen.   G. Duodenum, biopsy --  Small bowel mucosa with no significant   pathologic abnormality.              H. Stomach, body and antrum, biopsy --        1.  Changes consistent with reactive gastropathy       2.  Negative for H. pylori   I. Esophagus, lower, biopsy --       1.   Squamous mucosa with nonspecific reactive changes.        2.   No eosinophilia or goblet cell metaplasia is seen.   J. Esophagus, middle, biopsy -- squamous mucosa with no significant   pathologic abnormality.     ALLERGIES:   Allergies   Allergen Reactions     Fish-Derived Products Hives     Positive skin test to fish and shellfish on 9/14/2016.     Shellfish Allergy Hives, Itching and Shortness Of Breath     Gluten Meal      PERTINENT MEDICATIONS:    Current Outpatient Prescriptions:      azaTHIOprine (IMURAN) 50 MG tablet, Take 50 mg by mouth, Disp: , Rfl:      EPINEPHrine (EPIPEN 2-NAGI) 0.3  "MG/0.3ML injection 2-pack, Inject when needed for emergency treatment of severe allergic reactions (anaphylaxis)., Disp: , Rfl:      mesalamine (LIALDA) 1.2 g EC tablet, Take 4 tablets (4.8 g) by mouth daily, Disp: 180 tablet, Rfl: 3    SOCIAL HISTORY:  Social History     Social History     Marital status: Single     Spouse name: N/A     Number of children: N/A     Years of education: N/A     Occupational History     Not on file.     Social History Main Topics     Smoking status: Never Smoker     Smokeless tobacco: Never Used     Alcohol use No      Comment: None     Drug use: No     Sexual activity: Not on file     Other Topics Concern     Not on file     Social History Narrative     FAMILY HISTORY:  Family History   Problem Relation Age of Onset     Hypertension Father      Lupus Paternal Grandmother      Colon Cancer Paternal Grandfather      PHYSICAL EXAMINATION:  Constitutional: aaox3, cooperative, pleasant, not dyspneic/diaphoretic, no acute distress  Vitals reviewed: /73 (BP Location: Left arm, Patient Position: Sitting, Cuff Size: Adult Regular)  Pulse 65  Temp 97.3  F (36.3  C) (Oral)  Resp 18  Ht 1.93 m (6' 4\")  Wt 88.6 kg (195 lb 4.8 oz)  SpO2 95%  BMI 23.77 kg/m2  Wt:   Wt Readings from Last 2 Encounters:   08/20/18 88.6 kg (195 lb 4.8 oz)   07/03/18 87.8 kg (193 lb 9.6 oz)      Eyes: Sclera anicteric/injected  Ears/nose/mouth/throat: Normal oropharynx without ulcers or exudate, mucus membranes moist, hearing intact  Neck: supple, thyroid normal size  CV: No edema  Respiratory: Unlabored breathing  Lymph: No cervical lymphadenopathy  Abd: Nondistended, +bs, no hepatosplenomegaly, nontender, no peritoneal signs  Skin: warm, perfused, no jaundice  Psych: Normal affect  MSK: Normal gait    PERTINENT STUDIES:  Most recent CBC:  Recent Labs   Lab Test  07/03/18   1046 06/25/18   WBC  5.4  8.4   HGB  14.3  13.9   HCT  43.6  42.7   PLT  294  249     Most recent hepatic panel:  Recent Labs   Lab " Test  07/03/18   1046 06/25/18   ALT  67  149*   AST  54*  160*     Most recent creatinine:  Recent Labs   Lab Test  07/03/18   1046 06/25/18   CR  1.06  1.0         I performed a history and physical examination of the above patient and discussed the management with Dr. Fink on 8/20/2018. I reviewed the note and there are no changes to the past medical, family or social history.  A complete 10 point review of systems was obtained. Please see the HPI for pertinent positives and negatives. All other systems were reviewed and were found to be negative. I agree with the documented findings and plan of care as outlined with the following additions:    Asymptomatic development of high level antibodies to infliximab.  Continues in clinical remission.  Will optimize azathioprine and mesalamine at this time.  If ongoing mucosal inflammation, will discuss pros and cons of addition of adalimumab.       Again, thank you for allowing me to participate in the care of your patient.      Sincerely,    Jasen Watkins MD

## 2018-08-20 NOTE — PROGRESS NOTES
I performed a history and physical examination of the above patient and discussed the management with Dr. Fink on 8/20/2018. I reviewed the note and there are no changes to the past medical, family or social history.  A complete 10 point review of systems was obtained. Please see the HPI for pertinent positives and negatives. All other systems were reviewed and were found to be negative. I agree with the documented findings and plan of care as outlined with the following additions:    Asymptomatic development of high level antibodies to infliximab.  Continues in clinical remission.  Will optimize azathioprine and mesalamine at this time.  If ongoing mucosal inflammation, will discuss pros and cons of addition of adalimumab.     Jasen Watkins MD  GI Attending  Pager: [phone]

## 2018-08-25 LAB — TPMT BLD-CCNC: 29.4 U/ML (ref 24–44)

## 2018-08-27 ENCOUNTER — TELEPHONE (OUTPATIENT)
Dept: PHARMACY | Facility: CLINIC | Age: 28
End: 2018-08-27

## 2018-08-27 NOTE — TELEPHONE ENCOUNTER
Spoke with Uriah. Offered to call back when azathioprine dose increase is determined. Discussed with Blayne, he will plan to place order.     Uriah is available anytime this week in the afternoons. Will tentatively plan for tomorrow at 1PM.

## 2018-08-27 NOTE — TELEPHONE ENCOUNTER
Called Uriah for scheduled MTM. Father reports he is not available at this time. Left phone number for return call.

## 2018-08-28 DIAGNOSIS — K51.00 ULCERATIVE PANCOLITIS WITHOUT COMPLICATION (H): Primary | ICD-10-CM

## 2018-08-28 DIAGNOSIS — K51.00 ULCERATIVE PANCOLITIS WITHOUT COMPLICATION (H): ICD-10-CM

## 2018-08-28 RX ORDER — AZATHIOPRINE 50 MG/1
2 TABLET ORAL DAILY
Qty: 90 TABLET | Refills: 3 | Status: SHIPPED | OUTPATIENT
Start: 2018-08-28 | End: 2019-01-22

## 2018-08-28 RX ORDER — AZATHIOPRINE 50 MG/1
1.5 TABLET ORAL DAILY
Qty: 90 TABLET | Refills: 3 | Status: SHIPPED | OUTPATIENT
Start: 2018-08-28 | End: 2018-08-28

## 2018-08-29 ENCOUNTER — ALLIED HEALTH/NURSE VISIT (OUTPATIENT)
Dept: PHARMACY | Facility: CLINIC | Age: 28
End: 2018-08-29
Attending: INTERNAL MEDICINE
Payer: COMMERCIAL

## 2018-08-29 DIAGNOSIS — T78.40XD ALLERGIC REACTION, SUBSEQUENT ENCOUNTER: ICD-10-CM

## 2018-08-29 DIAGNOSIS — K51.00 ULCERATIVE PANCOLITIS WITHOUT COMPLICATION (H): Primary | ICD-10-CM

## 2018-08-29 PROCEDURE — 99605 MTMS BY PHARM NP 15 MIN: CPT | Mod: U4 | Performed by: PHARMACIST

## 2018-08-29 PROCEDURE — 99607 MTMS BY PHARM ADDL 15 MIN: CPT | Mod: U4 | Performed by: PHARMACIST

## 2018-08-29 NOTE — MR AVS SNAPSHOT
After Visit Summary   8/29/2018    Uriah Mortensen    MRN: [id number]           Patient Information     Date Of Birth          1990        Visit Information        Provider Department      8/29/2018 1:00 PM Jordana Almendarez, [facility] Rheumatology MTM        Today's Diagnoses     Ulcerative pancolitis without complication (H)    -  1    Allergic reaction, subsequent encounter          Care Instructions    Recommendations from today's MTM visit:                                                    MTM (medication therapy management) is a service provided by a clinical pharmacist designed to help you get the most of out of your medicines.   Today we reviewed what your medicines are for, how to know if they are working, that your medicines are safe and how to make your medicine regimen as easy as possible.     1. Increase azathioprine as directed by Dr. Fink, to 175 mg daily.     2. I will confirm with the GI nurse Cheyenne that you will have labs available in [place]. She will also send you a [employer] message regarding timing of lab work given your dose increase.    3. Please reach out if you have questions.    Next MTM visit: 1 month for phone check-in    To schedule another MTM appointment, please call the clinic directly or you may call the MTM scheduling line at [phone] or toll-free at [phone].     My Clinical Pharmacist's contact information:                                                      It was a pleasure speaking with you!  Please feel free to contact me with any questions or concerns you have.      Jordana Almendarez PharmD   MTM Pharmacist   Adult Rheumatology and IBD  Phone: [phone]    You may receive a survey about the MTM services you received.  I would appreciate your feedback to help me serve you better in the future. Please fill it out and return it when you can. Your comments will be anonymous.      My healthcare goals:                                                       [facility] IBD CLINIC PATIENT HANDOUT - THIOPURINE MEDICATIONS   AZATHIOPRINE  6-MERCAPTOPURINE (6-MP)  INTRODUCTION: Thiopurine medications are drugs used to treat many different inflammatory conditions. These include rheumatoid arthritis, lupus, vasculitis, and autoimmune hepatitis among others. Your gastroenterologist may prescribe this to treat your inflammatory bowel diseases.   The two main types of thiopurine medications that your doctor may use are azathioprine and 6-MP.  HOW DOES IT WORK? These medications work by decreasing the immune system which leads to decreased inflammation in the intestine and improved symptoms. They are sometimes called immunomodulators because of how they modulate the immune system.   H0W LONG DOES IT TAKE FOR THIS MEDICATION TO START TO WORK? These drugs usually take about 8-12 weeks to work. Therefore you may need another drug (often a steroid) to improve your symptoms faster, while the thiopurine will be your long term medication to control your disease.   DO I NEED TO GET ANY SPECIAL LABS WHILE TAKING THIS MEDICATION? When starting this drug you will need to get a complete blood count with differential and liver tests every week for 4 weeks, then every month for three months and then every 3-4 months thereafter.  CAN I TAKE THIS MEDICATION WHILE PREGNANT? If you are pregnant or thinking about getting pregnant in the near future, let your GI physician know about this so that you can talk about the risks and benefits of taking this medication during this period. Most GI physicians recommend continuing these medications during pregnancy if a patient is already on it and the IBD is under good control, as there is likely more risk to the baby if the medication is stopped and the disease flares. A large number of studies have looked at the effects if azathioprine and 6-MP on women with IBD in pregnancy, and the general consensus is that  these drugs are safe and well tolerated. However, it is important that you talk with your GI physician about the risks and benefits of these medications in your personal case.  CAN I BREAST FEED ON THESE MEDICATIONS? Drug manufacturers do not recommend breastfeeding by woman taking azathioprine or 6-MP. However, very little of the active drug is secreted into breast milk and there is no evidence of harm in children of mothers who have  while on these drugs. If you wish to breastfeed while on these medications talk to your doctor to discuss the benefits and risks.  WHAT ARE THE POTENTIAL SIDE EFFECTS OF THIS MEDICATION?  Nausea/Vomiting and/or Abdominal pain: about 10-15. Taking the medication twice daily in divided doses, taking it after eating, or taking it before bed may help decrease symptoms. If vomiting or severe abdominal pain occurs notify your doctor as this may be a sign of a serious reaction.  Pancreatitis: Rarely this drug can affect your pancreas. While not all nausea, vomiting and abdominal pain is pancreatitis, please let your doctor know if you have those symptoms so they can check you for pancreatitis.   Hepatitis (liver inflammation, swelling or damage),  Flu-like illness: or an allergic reaction which may fevers, joint pains and possible rash.   Bone marrow suppression: Rarely you can have an allergic reaction to this medication resulting in low blood cell counts. This is why when starting or changing doses your doctor will follow your blood work more closely.   Infections: These drugs can also lower the number of infection fighting white blood cells which can make you more susceptible to certain infections.  Lymphoma: Risk of lymphoma on these medications is 3-5 in 10,000. This risk can be increased when taken along with other immunosuppressive medications such as anti-TNF drugs. It is therefore important that you follow up regularly with your doctor and obtain regular blood work.   Skin  cancer: We recommend yearly skin exams to look for precancerous skin lesions or skin cancer. These can be treated without stopping the medication if caught early.   Cervical dysplasia: Exposure to these medications can increase the risk for pre-cancer of the cervix. We recommend routine PAP exams through your primary care doctor or gynecologist if you are on, or have been on, one of these medications.   Drug interactions: These drugs can affect many other medications. It is important to let your physicians know you are taking these drugs when starting other medications.       CAN I GET VACCINATIONS WHILE ON THESE MEDICATIONS? While on these medications and for 3 months after stopping it you cannot receive live vaccines. Examples of live vaccines include the live vaccine for polio, yellow fever, rubella (Amharic measles), MMR (measles, mumps and rubella), BCG (tuberculosis), varicella (Chicken pox), the zoster vaccine, and the nasal inhaled vaccine for influenza. It is important to note that the injection form of the flu vaccine is not live and can safely be given while on azathioprine or 6-MP.                Follow-ups after your visit        Your next 10 appointments already scheduled     Sep 24, 2018  3:00 PM CDT   (Arrive by 2:45 PM)   RETURN INFLAMMATORY BOWEL DISEASE with Jasen Watkins MD   [facility] ([facility])    [address] [place] [id number]   [phone]            Dec 17, 2018 10:30 AM CST   Colonoscopy with Jaesn Watkins MD   [facility] ([facility])    [address] [place] [id number]   [phone]            Dec 18, 2018  8:00 AM CST   (Arrive by 7:45 AM)   RETURN INFLAMMATORY BOWEL DISEASE with Jasen Watkins MD   [facility] ([facility])    [address] [place] [address]  [id number]   [phone]            Dec 18, 2018  9:30 AM CST   (Arrive by 9:15 AM)   Return General Liver with Graciela Herrera MD   [facility] Hepatology ([facility])    [address] [place] [id number]   [phone]              Future tests that were ordered for you today     Open Standing Orders        Priority Remaining Interval Expires Ordered    CBC with platelets differential Routine 8/8 8/31/2019 8/31/2018    CRP inflammation Routine 8/8 8/31/2019 8/31/2018    Erythrocyte sedimentation rate auto Routine 8/8 8/31/2019 8/31/2018    Hepatic panel Routine 8/8 8/31/2019 8/31/2018          Open Future Orders        Priority Expected Expires Ordered    CBC with platelets differential [ZSG661] (Week 12) Routine 11/23/2018 11/30/2018 8/31/2018    Hepatic panel [LAB20] (Week 12) Routine 11/23/2018 11/30/2018 8/31/2018    CRP inflammation [GDN1647] (Week 12) Routine 11/23/2018 11/30/2018 8/31/2018    Erythrocyte sedimentation rate auto [LAJ959] (Week 12) Routine 11/23/2018 11/30/2018 8/31/2018    CBC with platelets differential [MUC619] (Week 1) Routine 9/7/2018 9/14/2018 8/31/2018    Hepatic panel [LAB20] (Week 1) Routine 9/7/2018 9/14/2018 8/31/2018    CRP inflammation [YZM3959] (Week 1) Routine 9/7/2018 9/14/2018 8/31/2018    Erythrocyte sedimentation rate auto [KUY366] (Week 1) Routine 9/7/2018 9/14/2018 8/31/2018    CBC with platelets differential [ODJ958] (Week 2) Routine 9/14/2018 9/21/2018 8/31/2018    Hepatic panel [LAB20] (Week 2) Routine 9/14/2018 9/21/2018 8/31/2018    CRP inflammation [TGJ8737] (Week 2) Routine 9/14/2018 9/21/2018 8/31/2018    Erythrocyte sedimentation rate auto [GPF337] (Week 2) Routine 9/14/2018 9/21/2018 8/31/2018    CBC with platelets differential [KFZ972] (Week 4) Routine 9/28/2018 10/5/2018 8/31/2018    Hepatic panel [LAB20] (Week 4) Routine 9/28/2018 10/5/2018 8/31/2018    CRP inflammation [ZTV5104] (Week 4) Routine  9/28/2018 10/5/2018 8/31/2018    Erythrocyte sedimentation rate auto [UIX790] (Week 4) Routine 9/28/2018 10/5/2018 8/31/2018    CBC with platelets differential [KFF796] (Week 8) Routine 10/26/2018 11/2/2018 8/31/2018    Hepatic panel [LAB20] (Week 8) Routine 10/26/2018 11/2/2018 8/31/2018    CRP inflammation [MSK7184] (Week 8) Routine 10/26/2018 11/2/2018 8/31/2018    Erythrocyte sedimentation rate auto [FDT097] (Week 8) Routine 10/26/2018 11/2/2018 8/31/2018            Who to contact     If you have questions or need follow up information about today's clinic visit or your schedule please contact [facility] RHEUMATOLOGY [facility] directly at [phone].  Normal or non-critical lab and imaging results will be communicated to you by [employer], letter or phone within 4 business days after the clinic has received the results. If you do not hear from us within 7 days, please contact the clinic through [employer] or phone. If you have a critical or abnormal lab result, we will notify you by phone as soon as possible.  Submit refill requests through [employer] or call your pharmacy and they will forward the refill request to us. Please allow 3 business days for your refill to be completed.          Additional Information About Your Visit        MyChart Information     [employer] gives you secure access to your electronic health record. If you see a primary care provider, you can also send messages to your care team and make appointments. If you have questions, please call your primary care clinic.  If you do not have a primary care provider, please call [phone] and they will assist you.        Care EveryWhere ID     This is your Care EveryWhere ID. This could be used by other organizations to access your [facility] medical records  [id number]         Blood Pressure from Last 3 Encounters:   08/20/18 133/73   07/03/18 128/77   07/03/18 126/80    Weight from Last 3 Encounters:   08/20/18 195 lb 4.8 oz (88.6 kg)    07/03/18 193 lb 9.6 oz (87.8 kg)   07/03/18 193 lb 6.4 oz (87.7 kg)              Today, you had the following     No orders found for display       Primary Care Provider Office Phone # Fax #    Elsie Ridley [phone]       [facility] [address] [place] [address]        Equal Access to Services     KAREN QUEEN : Hadii aad ku hadasho Soomaali, waaxda luqadaha, qaybta kaalmada adeegyada, waxay idiin hayaan adeeg kharash la'aan ah. So [facility] [phone].    ATENCIÓN: Si gaby marie, tiene a germain disposición servicios gratuitos de asistencia lingüística. Llame al [phone].    We comply with applicable federal civil rights laws and [place] laws. We do not discriminate on the basis of race, color, national origin, age, disability, sex, sexual orientation, or gender identity.            Thank you!     Thank you for choosing [facility] RHEUMATOLOGY [facility]  for your care. Our goal is always to provide you with excellent care. Hearing back from our patients is one way we can continue to improve our services. Please take a few minutes to complete the written survey that you may receive in the mail after your visit with us. Thank you!             Your Updated Medication List - Protect others around you: Learn how to safely use, store and throw away your medicines at www.disposemymeds.org.          This list is accurate as of 8/29/18 11:59 PM.  Always use your most recent med list.                   Brand Name Dispense Instructions for use Diagnosis    azaTHIOprine 50 MG tablet    IMURAN    90 tablet    Take 3.5 tablets (175 mg) by mouth daily    Ulcerative pancolitis without complication (H)       EPIPEN 2-NAGI 0.3 MG/0.3ML injection 2-pack   Generic drug:  EPINEPHrine      Inject when needed for emergency treatment of severe allergic reactions (anaphylaxis).        mesalamine 1.2 g EC tablet    LIALDA    180 tablet    Take 4 tablets (4.8 g) by mouth daily    Ulcerative  pancolitis without complication (H)

## 2018-08-29 NOTE — PROGRESS NOTES
"SUBJECTIVE/OBJECTIVE:                           Uriah Mortensen is a 28 year old male called for an initial visit for Medication Therapy Management.  He was referred to me from Dr. Watkins.    Chief Complaint: No questions or concerns today - med education on aza dose escalation. [place] from pharmacy that order was ready, so he figured this call would follow.    Allergies/ADRs: Reviewed in Epic  Tobacco: No tobacco use  Alcohol: not currently using    Medication Adherence/Access:  no issues reported    Ulcerative Colitis: Current therapy includes azathioprine 175 mg daily (recently increased from 50 mg daily, has not started increased dose yet) and mesalamine 4.8 g daily. Recent development of antibodies to Remicade. Saw Dr. Fink/Roland last week for office visit, determination was made to optimize azathioprine/mesalamine, \"if ongoing mucosal inflammation, will discuss pros and cons of addition of adalimumab.\" TPMT drawn last week to assess safety of increasing azathioprine dose (to 2 mg/kg/day). He is familiar with azathioprine and has tolerated it thus far. Seeing Dr. Watkins 9/24.     Patient has been on and tolerates low-dose azathioprine. Reviewed major recommendations regarding azathioprine including skin checks, risk of infection, caution with live vaccines (technically low level of immunosuppression at < 3mg/kg/day per IDSA guidelines), and required lab monitoring. Pt gets labs done at [facility]. He used to get labs drawn with his infusions once a month, now he is not sure when/how often he should be doing lab work. Discussed general recommendation if starting azathioprine, may be modified given this is a dose adjustment.    Thiopurine Methyltransferase RBC 29.4  24.0 - 44.0 U/mL Final 08/20/2018  4:15 PM 1740     IBD Health Care Maintenance:    Vaccinations:  All patients on biologics should avoid live vaccines, Uriah was recently on biologic therapy.  -- Influenza (every year) last 2017, will be due " this fall  -- TdaP (every 10 years) last per MIIC 2004, not clarified today as not primary focus of discussion  -- Pneumococcal Pneumonia (once plus booster at 5 years) Pneumovax-23 2016  -- Yearly assessment for latent Tb (verbal screening and exam, PPD or QuantiFERON-Tb testing) unclear    One time confirmation of immunity or serologies:  -- Hepatitis A (serologies or immunizations) immunized in 2014  -- Hepatitis B (serologies or immunizations) immunizations completed in 2005  -- MMR completed 2005  -- Meningococcal meningitis (all patients at risk for meningitis)-- completed 2008    Skin cancer screening: Annual visual exam of skin by dermatologist since patient is immunocompromised    Misc:  -- Avoid tobacco use  -- Avoid NSAIDs as there is potentially a 25% chance of causing an IBD flare    Serious Allergic Reaction: Current therapy includes EpiPen for severe allergic reactions. No concerns, reports availability.     ASSESSMENT:                             Current medications were reviewed today.     Medication Adherence: excellent, no issues identified    Ulcerative Colitis: Unchanged. Unclear if lab plan is on file given outside lab. Patient would benefit from AZA monitoring labs given dose increase.     Serious Allergic Reactions: Stable.     PLAN:                            1. Pt to increase azathioprine as directed by Dr. Fink  2. Jordana to discuss labs (Amari Romano) with Cheyenne Discussed with Cheyenne on 8/29, she will fax orders to [facility] lab and send Uriah hernandez [employer].     Future Considerations  -clarify tdap/Tb screening    I spent 7 minutes with this patient today. I offer these suggestions for consideration by Dr. Watkins. A copy of the visit note was provided to the patient's referring provider.    Will follow up in 1 month for check-in.    The patient was sent via [employer] a summary of these recommendations as an after visit summary.     Jordana Almendarez PharmD   MTM Pharmacist   Adult Rheumatology  and IBD  Phone: [phone]

## 2018-08-31 ENCOUNTER — CARE COORDINATION (OUTPATIENT)
Dept: GASTROENTEROLOGY | Facility: CLINIC | Age: 28
End: 2018-08-31

## 2018-08-31 DIAGNOSIS — K51.90 UC (ULCERATIVE COLITIS) (H): Primary | ICD-10-CM

## 2018-08-31 NOTE — PATIENT INSTRUCTIONS
Recommendations from today's MTM visit:                                                    MTM (medication therapy management) is a service provided by a clinical pharmacist designed to help you get the most of out of your medicines.   Today we reviewed what your medicines are for, how to know if they are working, that your medicines are safe and how to make your medicine regimen as easy as possible.     1. Increase azathioprine as directed by Dr. Fink, to 175 mg daily.     2. I will confirm with the GI nurse Cheyenne that you will have labs available in [place]. She will also send you a Purveyour message regarding timing of lab work given your dose increase.    3. Please reach out if you have questions.    Next MTM visit: 1 month for phone check-in    To schedule another MTM appointment, please call the clinic directly or you may call the MTM scheduling line at [phone] or toll-free at [phone].     My Clinical Pharmacist's contact information:                                                      It was a pleasure speaking with you!  Please feel free to contact me with any questions or concerns you have.      Jordana Almendarez PharmD   MTM Pharmacist   Adult Rheumatology and IBD  Phone: [phone]    You may receive a survey about the MTM services you received.  I would appreciate your feedback to help me serve you better in the future. Please fill it out and return it when you can. Your comments will be anonymous.      My healthcare goals:                                                      [facility] IBD CLINIC PATIENT HANDOUT - THIOPURINE MEDICATIONS   AZATHIOPRINE  6-MERCAPTOPURINE (6-MP)  INTRODUCTION: Thiopurine medications are drugs used to treat many different inflammatory conditions. These include rheumatoid arthritis, lupus, vasculitis, and autoimmune hepatitis among others. Your gastroenterologist may prescribe this to treat your inflammatory bowel diseases.   The two main  types of thiopurine medications that your doctor may use are azathioprine and 6-MP.  HOW DOES IT WORK? These medications work by decreasing the immune system which leads to decreased inflammation in the intestine and improved symptoms. They are sometimes called immunomodulators because of how they modulate the immune system.   H0W LONG DOES IT TAKE FOR THIS MEDICATION TO START TO WORK? These drugs usually take about 8-12 weeks to work. Therefore you may need another drug (often a steroid) to improve your symptoms faster, while the thiopurine will be your long term medication to control your disease.   DO I NEED TO GET ANY SPECIAL LABS WHILE TAKING THIS MEDICATION? When starting this drug you will need to get a complete blood count with differential and liver tests every week for 4 weeks, then every month for three months and then every 3-4 months thereafter.  CAN I TAKE THIS MEDICATION WHILE PREGNANT? If you are pregnant or thinking about getting pregnant in the near future, let your GI physician know about this so that you can talk about the risks and benefits of taking this medication during this period. Most GI physicians recommend continuing these medications during pregnancy if a patient is already on it and the IBD is under good control, as there is likely more risk to the baby if the medication is stopped and the disease flares. A large number of studies have looked at the effects if azathioprine and 6-MP on women with IBD in pregnancy, and the general consensus is that these drugs are safe and well tolerated. However, it is important that you talk with your GI physician about the risks and benefits of these medications in your personal case.  CAN I BREAST FEED ON THESE MEDICATIONS? Drug manufacturers do not recommend breastfeeding by woman taking azathioprine or 6-MP. However, very little of the active drug is secreted into breast milk and there is no evidence of harm in children of mothers who have   while on these drugs. If you wish to breastfeed while on these medications talk to your doctor to discuss the benefits and risks.  WHAT ARE THE POTENTIAL SIDE EFFECTS OF THIS MEDICATION?  Nausea/Vomiting and/or Abdominal pain: about 10-15. Taking the medication twice daily in divided doses, taking it after eating, or taking it before bed may help decrease symptoms. If vomiting or severe abdominal pain occurs notify your doctor as this may be a sign of a serious reaction.  Pancreatitis: Rarely this drug can affect your pancreas. While not all nausea, vomiting and abdominal pain is pancreatitis, please let your doctor know if you have those symptoms so they can check you for pancreatitis.   Hepatitis (liver inflammation, swelling or damage),  Flu-like illness: or an allergic reaction which may fevers, joint pains and possible rash.   Bone marrow suppression: Rarely you can have an allergic reaction to this medication resulting in low blood cell counts. This is why when starting or changing doses your doctor will follow your blood work more closely.   Infections: These drugs can also lower the number of infection fighting white blood cells which can make you more susceptible to certain infections.  Lymphoma: Risk of lymphoma on these medications is 3-5 in 10,000. This risk can be increased when taken along with other immunosuppressive medications such as anti-TNF drugs. It is therefore important that you follow up regularly with your doctor and obtain regular blood work.   Skin cancer: We recommend yearly skin exams to look for precancerous skin lesions or skin cancer. These can be treated without stopping the medication if caught early.   Cervical dysplasia: Exposure to these medications can increase the risk for pre-cancer of the cervix. We recommend routine PAP exams through your primary care doctor or gynecologist if you are on, or have been on, one of these medications.   Drug interactions: These drugs  can affect many other medications. It is important to let your physicians know you are taking these drugs when starting other medications.       CAN I GET VACCINATIONS WHILE ON THESE MEDICATIONS? While on these medications and for 3 months after stopping it you cannot receive live vaccines. Examples of live vaccines include the live vaccine for polio, yellow fever, rubella (Romansh measles), MMR (measles, mumps and rubella), BCG (tuberculosis), varicella (Chicken pox), the zoster vaccine, and the nasal inhaled vaccine for influenza. It is important to note that the injection form of the flu vaccine is not live and can safely be given while on azathioprine or 6-MP.

## 2018-08-31 NOTE — PROGRESS NOTES
Called pt to make sure he is taking the higher dose of azathioprine.   Pt attending class. Father will double check with pt to make sure he is taking  175mg of Azathioprone. Discuss labs and that orders had been faxed to clinic lab. Father stated that pt does not always ask questions. Discussed  medications with father.  Will send a my chart message also.  Also father requesting a printed copy of the labs that need to be done. Sent via mail.

## 2018-09-07 ENCOUNTER — TRANSFERRED RECORDS (OUTPATIENT)
Dept: HEALTH INFORMATION MANAGEMENT | Facility: CLINIC | Age: 28
End: 2018-09-07

## 2018-09-13 ENCOUNTER — CARE COORDINATION (OUTPATIENT)
Dept: GASTROENTEROLOGY | Facility: CLINIC | Age: 28
End: 2018-09-13

## 2018-09-13 NOTE — PROGRESS NOTES
Returned voice mail message from pt. Pt said since increasing his azathioprine has loss of appetite and feeling full.  Suggested he take at night but faithfully take daily.  That some pts do better if take at night. Pt will try and keep us posted.  Pt continues to take lialda.

## 2018-09-14 ENCOUNTER — TRANSFERRED RECORDS (OUTPATIENT)
Dept: HEALTH INFORMATION MANAGEMENT | Facility: CLINIC | Age: 28
End: 2018-09-14

## 2018-09-17 ENCOUNTER — CARE COORDINATION (OUTPATIENT)
Dept: GASTROENTEROLOGY | Facility: CLINIC | Age: 28
End: 2018-09-17

## 2018-09-17 NOTE — PROGRESS NOTES
Pt called and wondering if he still needed to have his September appt.  Pt had appt moved up due to change in therapy plan.  Cancelled September appt. Pt now taking azathioprine at night and noted less nausea and increase in appetite. Will continue Azathioprine at night. Colonoscopy in December and clinic appt.

## 2018-09-28 ENCOUNTER — TRANSFERRED RECORDS (OUTPATIENT)
Dept: HEALTH INFORMATION MANAGEMENT | Facility: CLINIC | Age: 28
End: 2018-09-28

## 2018-10-02 ENCOUNTER — DOCUMENTATION ONLY (OUTPATIENT)
Dept: GASTROENTEROLOGY | Facility: CLINIC | Age: 28
End: 2018-10-02

## 2018-10-10 ENCOUNTER — DOCUMENTATION ONLY (OUTPATIENT)
Dept: GASTROENTEROLOGY | Facility: CLINIC | Age: 28
End: 2018-10-10

## 2018-10-16 ENCOUNTER — DOCUMENTATION ONLY (OUTPATIENT)
Dept: GASTROENTEROLOGY | Facility: CLINIC | Age: 28
End: 2018-10-16

## 2018-10-24 ENCOUNTER — TRANSFERRED RECORDS (OUTPATIENT)
Dept: HEALTH INFORMATION MANAGEMENT | Facility: CLINIC | Age: 28
End: 2018-10-24

## 2018-10-26 ENCOUNTER — DOCUMENTATION ONLY (OUTPATIENT)
Dept: GASTROENTEROLOGY | Facility: CLINIC | Age: 28
End: 2018-10-26

## 2018-12-07 DIAGNOSIS — K83.01 PSC (PRIMARY SCLEROSING CHOLANGITIS) (H): Primary | ICD-10-CM

## 2018-12-10 ENCOUNTER — TELEPHONE (OUTPATIENT)
Dept: GASTROENTEROLOGY | Facility: OUTPATIENT CENTER | Age: 28
End: 2018-12-10

## 2018-12-10 DIAGNOSIS — K51.00 ULCERATIVE PANCOLITIS (H): Primary | ICD-10-CM

## 2018-12-10 NOTE — TELEPHONE ENCOUNTER
Patient taking any blood thinners ? No     Heart disease ? Denies     Lung disease ?Denies       Sleep apnea ?Denies     Diabetic ?Denies     Kidney disease ?Denies     Dialysis ? N/a     Electronic implanted medical devices ?Denies     Are you taking any narcotic pain medication ? Denies   What is your daily dosage ?    PTSD ? N/a     Prep instructions reviewed with patient ? Patient declined review.  policy, MAC sedation plan reviewed. Advised patient to have someone stay with him post exam    Pharmacy : [facility]    Indication for procedure :   Celiac disease [K90.0]  - Primary       UC (ulcerative colitis) (H) [K51.90]             Referring provider : Self     Arrival Time : 9:30 AM

## 2018-12-11 ENCOUNTER — TELEPHONE (OUTPATIENT)
Dept: GASTROENTEROLOGY | Facility: CLINIC | Age: 28
End: 2018-12-11

## 2018-12-12 ENCOUNTER — TELEPHONE (OUTPATIENT)
Dept: GASTROENTEROLOGY | Facility: CLINIC | Age: 28
End: 2018-12-12

## 2018-12-17 ENCOUNTER — TRANSFERRED RECORDS (OUTPATIENT)
Dept: HEALTH INFORMATION MANAGEMENT | Facility: CLINIC | Age: 28
End: 2018-12-17

## 2018-12-17 ENCOUNTER — DOCUMENTATION ONLY (OUTPATIENT)
Dept: GASTROENTEROLOGY | Facility: OUTPATIENT CENTER | Age: 28
End: 2018-12-17
Payer: COMMERCIAL

## 2018-12-18 ENCOUNTER — TELEPHONE (OUTPATIENT)
Dept: GASTROENTEROLOGY | Facility: CLINIC | Age: 28
End: 2018-12-18

## 2018-12-18 ENCOUNTER — OFFICE VISIT (OUTPATIENT)
Dept: GASTROENTEROLOGY | Facility: CLINIC | Age: 28
End: 2018-12-18
Attending: INTERNAL MEDICINE
Payer: COMMERCIAL

## 2018-12-18 ENCOUNTER — OFFICE VISIT (OUTPATIENT)
Dept: GASTROENTEROLOGY | Facility: CLINIC | Age: 28
End: 2018-12-18
Payer: COMMERCIAL

## 2018-12-18 VITALS
DIASTOLIC BLOOD PRESSURE: 78 MMHG | BODY MASS INDEX: 24.26 KG/M2 | WEIGHT: 199.2 LBS | SYSTOLIC BLOOD PRESSURE: 124 MMHG | OXYGEN SATURATION: 98 % | HEIGHT: 76 IN | HEART RATE: 72 BPM | TEMPERATURE: 97.4 F

## 2018-12-18 DIAGNOSIS — K83.01 PSC (PRIMARY SCLEROSING CHOLANGITIS) (H): Primary | ICD-10-CM

## 2018-12-18 DIAGNOSIS — K51.90 ULCERATIVE COLITIS (H): ICD-10-CM

## 2018-12-18 DIAGNOSIS — K51.00 ULCERATIVE PANCOLITIS WITHOUT COMPLICATION (H): ICD-10-CM

## 2018-12-18 DIAGNOSIS — K83.01 PSC (PRIMARY SCLEROSING CHOLANGITIS) (H): ICD-10-CM

## 2018-12-18 DIAGNOSIS — K51.90 UC (ULCERATIVE COLITIS) (H): ICD-10-CM

## 2018-12-18 DIAGNOSIS — K51.018 ULCERATIVE PANCOLITIS WITH OTHER COMPLICATION (H): Primary | ICD-10-CM

## 2018-12-18 LAB
ALBUMIN SERPL-MCNC: 3.5 G/DL (ref 3.4–5)
ALP SERPL-CCNC: 178 U/L (ref 40–150)
ALT SERPL W P-5'-P-CCNC: 48 U/L (ref 0–70)
ANION GAP SERPL CALCULATED.3IONS-SCNC: 5 MMOL/L (ref 3–14)
AST SERPL W P-5'-P-CCNC: 37 U/L (ref 0–45)
BILIRUB DIRECT SERPL-MCNC: 0.2 MG/DL (ref 0–0.2)
BILIRUB SERPL-MCNC: 0.4 MG/DL (ref 0.2–1.3)
BUN SERPL-MCNC: 9 MG/DL (ref 7–30)
CALCIUM SERPL-MCNC: 8.4 MG/DL (ref 8.5–10.1)
CHLORIDE SERPL-SCNC: 104 MMOL/L (ref 94–109)
CO2 SERPL-SCNC: 29 MMOL/L (ref 20–32)
CREAT SERPL-MCNC: 1.1 MG/DL (ref 0.66–1.25)
CRP SERPL-MCNC: <2.9 MG/L (ref 0–8)
ERYTHROCYTE [DISTWIDTH] IN BLOOD BY AUTOMATED COUNT: 14.2 % (ref 10–15)
GFR SERPL CREATININE-BSD FRML MDRD: 81 ML/MIN/1.7M2
GLUCOSE SERPL-MCNC: 94 MG/DL (ref 70–99)
HCT VFR BLD AUTO: 42.5 % (ref 40–53)
HGB BLD-MCNC: 14 G/DL (ref 13.3–17.7)
INR PPP: 1.02 (ref 0.86–1.14)
MCH RBC QN AUTO: 29.4 PG (ref 26.5–33)
MCHC RBC AUTO-ENTMCNC: 32.9 G/DL (ref 31.5–36.5)
MCV RBC AUTO: 89 FL (ref 78–100)
PLATELET # BLD AUTO: 234 10E9/L (ref 150–450)
POTASSIUM SERPL-SCNC: 4.1 MMOL/L (ref 3.4–5.3)
PROT SERPL-MCNC: 7.2 G/DL (ref 6.8–8.8)
RBC # BLD AUTO: 4.77 10E12/L (ref 4.4–5.9)
SODIUM SERPL-SCNC: 138 MMOL/L (ref 133–144)
WBC # BLD AUTO: 4.9 10E9/L (ref 4–11)

## 2018-12-18 PROCEDURE — 36415 COLL VENOUS BLD VENIPUNCTURE: CPT | Performed by: INTERNAL MEDICINE

## 2018-12-18 PROCEDURE — 80048 BASIC METABOLIC PNL TOTAL CA: CPT | Performed by: INTERNAL MEDICINE

## 2018-12-18 PROCEDURE — 86140 C-REACTIVE PROTEIN: CPT | Performed by: INTERNAL MEDICINE

## 2018-12-18 PROCEDURE — 85027 COMPLETE CBC AUTOMATED: CPT | Performed by: INTERNAL MEDICINE

## 2018-12-18 PROCEDURE — 80076 HEPATIC FUNCTION PANEL: CPT | Performed by: INTERNAL MEDICINE

## 2018-12-18 PROCEDURE — 85610 PROTHROMBIN TIME: CPT | Performed by: INTERNAL MEDICINE

## 2018-12-18 ASSESSMENT — PAIN SCALES - GENERAL
PAINLEVEL: NO PAIN (0)
PAINLEVEL: NO PAIN (0)

## 2018-12-18 ASSESSMENT — MIFFLIN-ST. JEOR: SCORE: 1985.07

## 2018-12-18 NOTE — TELEPHONE ENCOUNTER
Prior Authorization Approval    Authorization Effective Date: 11/18/2018  Authorization Expiration Date: 12/18/2020  Medication: HUMIRA - approved  Approved Dose/Quantity: STARTER AND MAINTENANCE PACK  Reference #: [id number]   Insurance Company: [employer] - Phone [phone] Fax [phone]  Expected CoPay: $3.00     CoPay Card Available:      Foundation Assistance Needed:    Which Pharmacy is filling the prescription (Not needed for infusion/clinic administered): [facility] - [place] [address]  Pharmacy Notified: Yes  Patient Notified: Yes

## 2018-12-18 NOTE — LETTER
12/18/2018     RE: Uriah Mortensen  [address] [place] [address]     Dear Colleague,    Thank you for referring your patient, Uriah Mortensen, to the [facility] HEPATOLOGY at [facility]. Please see a copy of my visit note below.    A/P  28 y M with PSC and UC.  Chronic elevations in liver enzymes with biopsy last year showing just PSC. Labs are much improved with nearly normal values     Will get MRI in the next few weeks for CCA screening and assessment of PSC. Ok to get in [place]. Discussed screening in PSC.    Labs will be done with start of Humira and liver tests will be monitored.     RTC in 1 year    This was a 25 minute visit, over 50% counseling and coordination of care.   Graciela Herrera MD  Hepatology/Liver Transplant  Medical Director, Liver Transplantation  [facility]  ===================================================================  S: 28 y M with PSC diagnosed in 2014. Also has UC pancolitis and celiac. Saw Dr. Watkins this morning. He is here with his dad today.     He feels well. No fever, no abdominal pain, no diarrhea, no blood in stool, good appetite. no weight loss.      PSC  -dx at [facility] through MRI  -Never required ERCP  -No episodes of cholangitis  -Alk phos up to 800s, was around 300 chronically, now 178  -Normal platelet count, normal kidney function.    -Liver biopsy 9/2017 showed PSC, no inflammation, read by Dr. Marte. LFTS at that time were   AST 99  -MRI done 8/8/17 in [place] showed findings c/w PSC and no mass.      UC  -On vedolizumab 8/16-8/2017 and it was stopped 2/2 ongoing inflammation on colonoscopy  -Dr. Watkins saw him this am. He is in clinical remission. 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     EXAM  124/78 72 97.4 98% 199 BMI 24.3  GENERAL:  Pleasant, in no acute distress.   HEENT:  No icterus, no oral lesions.   LYMPH:  No supraclavicular or cervical lymphadenopathy.   CARDIOVASCULAR:  Regular rate and rhythm.   CHEST:  Lungs are clear.   ABDOMEN:  Bowel sounds are present, soft, nontender, nondistended, no hepatosplenomegaly.   EXTREMITIES:  No edema.   SKIN:  No rash.   NEUROLOGIC:  Speech is fluent and clear.  No asterixis or tremor.     Again, thank you for allowing me to participate in the care of your patient.      Sincerely,    Graciela Herrera MD

## 2018-12-18 NOTE — LETTER
12/18/2018       RE: Uriah Mortensen  [address] [place] [address]     Dear Colleague,    Thank you for referring your patient, Uriah Mortensen, to the [facility] GASTROENTEROLOGY AND IBD CLINIC at [facility]. Please see a copy of my visit note below.    IBD CLINIC VISIT     CC/REFERRING MD:  Elsie Ridley  REASON FOR CONSULTATION: Ulcerative colitis-PSC    IBD HISTORY  Age at diagnosis: 23  Extent of disease: Pancolitis   Current UC medications:   - Azathioprine 50 mg daily: Started 2018  - Lialda 4.8g daily   Prior IBD surgeries: None  Prior IBD Medications:  - Vedolizumab (8/2016 - 8/2017) stopped due to ongoing inflammation on colonoscopy (not clear if drug/Ab levels were checked)  - Infliximab-developed antibodies (8/2018)  - Budesonide  - Asacol     DRUG MONITORING  TPMT enzyme activity: 29.4 (8/20/18)    6-TGN/6-MMPN levels: NA    Biologic concentration:  Infliximab 7/25/18-drug level 0, antibody greater than 200    DISEASE ASSESSMENT  Labs:  Recent Labs   Lab Test 08/20/18  1615 08/11/17  1005   CRP <2.9 <2.9   SED 8  --      Endoscopic assessment:   Colonoscopy 12/17/18- patchy erythema, edema, ulceration, and friability in the cecum, ascending, transverse colon.  With relative sparing of right side of the colon.  TI was normal.  Scarring in the rectum.  3 mm polyp removed.  Biopsies taken throughout.  Enterography: NA  Fecal calprotectin: NA  C diff: NA  Woody score: 0/9 12/18/2018     ASSESSMENT/PLAN  28-year-old man with PSC colitis on Azathioprine and Lialda who presents for follow-up.    1. Colitis: Likely PSC colitis- Currently, remains in clinical remission, but has endoscopic evidence of active disease.  Given his higher risk for colorectal cancer, plan to escalate immunosuppression to biologic therapy with adalimumab.  - Start adalimumab  - Continue azathioprine 175mg daily  - Continue lialda 4.8g daily   - Continue multivitamin, including  calcium and vit D    2. PSC: Followed by Dr. Herrera of the Liver center.   - Continue PSC care by Dr. Herrera through Liver center      3. Celiac disease: Per patient diagnosed at [facility], but path unavailable. Celiac serologies normal in [facility] records.  Duodenal biopsies from 8/14/17 normal (presumably on gluten free diet). Will have cramping and diarrhea if he accidentally gets gluten  - Tentative plan to continue gluten free diet for now.   - Yearly TTG (Next in April 2019) ***    4. Eosinophilic esophagitis: Unclear diagnosis at this time. I cannot see any pathology records to support this.  Pathology from distal and mid esophagus in 8/14/17 without evidence of EoE or reflux.   -- Obtain prior EGD pathology      5. Esophageal stenosis: Very mild benign stenosis at 41 cm. Unclear if this represents acid reflux, changes from EoE or congenital narrowing.      Given unclear diagnosis of celiac disease, EoE and stricture, will plan to repeat EGD off of PPI with next colonoscopy.      IBD Health Care Maintenance:    Vaccinations:  All patients on biologics should avoid live vaccines.    -- Influenza (every year)  -- TdaP (every 10 years)  -- Pneumococcal Pneumonia (once plus booster at 5 years)  -- Prevnar 13  -- Yearly assessment for latent Tb (verbal screening and exam, PPD or QuantiFERON-Tb testing)    One time confirmation of immunity or serologies:  -- Hepatitis A (serologies or immunizations)  -- Hepatitis B (serologies or immunizations)  -- Varicella  -- MMR  -- HPV (all aged 18-26)  -- Meningococcal meningitis (all patients at risk for meningitis)    Bone mineral density screening   -- Recommend all patients supplement with calcium and vitamin D    Cancer Screening:  Colon cancer screening:  Given PSC colitis, recommend patient undergo regular yearly dysplasia surveillance   Next dysplasia screening is recommended December 2018.    Skin cancer screening: Annual visual exam of skin by dermatologist since patient  is immunocompromised    Misc:  -- Avoid tobacco use  -- Avoid NSAIDs as there is potentially a 25% chance of causing an IBD flare    Return to clinic in 5 months as previously scheduled    Thank you for this consultation.  It was a pleasure to participate in the care of this patient; please contact us with any further questions.     Patient was seen and discussed with attending, Dr. Watkins.    Luisana Kendrick MD  GI Fellow    HPI:   Currently, doing well with his normal 2-3 brown, formed bowel movements per day with no melena or hematochezia.  He denies abdominal pain.  No fever, chills, weight change, rash, joint pain, uveitis, aphthous ulcers.  He has been off budesonide since mid July.  He continues azathioprine 50 mg per day and mesalamine 4.8 g per day without any side effects.  He denies NSAID use.  He denies smoking.    ROS:    No fevers or chills  No weight loss  No blurry vision, double vision or change in vision  No sore throat  No lymphadenopathy  No headache, paraesthesias, or weakness in a limb  No shortness of breath or wheezing  No chest pain or pressure  No arthralgias or myalgias  No rashes or skin changes  No odynophagia or dysphagia  No BRBPR, hematochezia, melena  No dysuria, frequency or urgency  No hot/cold intolerance  No anxiety or depression    Extra intestinal manifestations of IBD:  No uveitis/episcleritis  No aphthous ulcers   No arthritis   No erythema nodosum/pyoderma gangrenosum.     PERTINENT PAST MEDICAL HISTORY:  Past Medical History:   Diagnosis Date     Celiac disease      PSC (primary sclerosing cholangitis)      Ulcerative colitis (H)      PREVIOUS SURGERIES:  No past surgical history on file.    PREVIOUS ENDOSCOPY:  Colonoscopy 4/11/18-granularity and loss of vascularity found in a continuous of circumferential pattern from the transverse colon to the hepatic flexure-mild.  Altered vascularity, erythema, granularity, mucus and shallow ulcerations from rectum to splenic flexure?.   TI appeared normal    Pathology 8/14/17  Final Pathologic Diagnosis   A. Terminal ileum, biopsy -- Small bowel mucosa with no significant   pathologic abnormality.   B. Colon, cecum and ascending, biopsies --       1.   Focal mildly active chronic colitis and pseudopolyp.         2.  No dysplasia seen.   C. Colon, transverse, biopsies --       1.  Marked architectural disorder and pseudopolyp with minimal   active colitis seen.       2.  No dysplasia seen.    D. Colon, descending, biopsy --       1.  Chronic colitis with focal mild activity.       2.  No dysplasia seen.   E. Colon, sigmoid, biopsy --       1.  Chronic colitis with mild activity.       2.  No dysplasia seen.   F. Colon, rectum, biopsy --       1.  Chronic colitis with minimal activity.       2.  No dysplasia seen.   G. Duodenum, biopsy --  Small bowel mucosa with no significant   pathologic abnormality.              H. Stomach, body and antrum, biopsy --        1.  Changes consistent with reactive gastropathy       2.  Negative for H. pylori   I. Esophagus, lower, biopsy --       1.   Squamous mucosa with nonspecific reactive changes.        2.   No eosinophilia or goblet cell metaplasia is seen.   J. Esophagus, middle, biopsy -- squamous mucosa with no significant   pathologic abnormality.     ALLERGIES:   Allergies   Allergen Reactions     Fish-Derived Products Hives     Positive skin test to fish and shellfish on 9/14/2016.     Shellfish Allergy Hives, Itching and Shortness Of Breath     Gluten Meal      PERTINENT MEDICATIONS:    Current Outpatient Medications:      azaTHIOprine (IMURAN) 50 MG tablet, Take 3.5 tablets (175 mg) by mouth daily, Disp: 90 tablet, Rfl: 3     EPINEPHrine (EPIPEN 2-NAGI) 0.3 MG/0.3ML injection 2-pack, Inject when needed for emergency treatment of severe allergic reactions (anaphylaxis)., Disp: , Rfl:      mesalamine (LIALDA) 1.2 g EC tablet, Take 4 tablets (4.8 g) by mouth daily, Disp: 180 tablet, Rfl: 3    SOCIAL  "HISTORY:  Social History     Socioeconomic History     Marital status: Single     Spouse name: Not on file     Number of children: Not on file     Years of education: Not on file     Highest education level: Not on file   Social Needs     Financial resource strain: Not on file     Food insecurity - worry: Not on file     Food insecurity - inability: Not on file     Transportation needs - medical: Not on file     Transportation needs - non-medical: Not on file   Occupational History     Not on file   Tobacco Use     Smoking status: Never Smoker     Smokeless tobacco: Never Used   Substance and Sexual Activity     Alcohol use: No     Alcohol/week: 0.0 oz     Comment: None     Drug use: No     Sexual activity: Not on file   Other Topics Concern     Not on file   Social History Narrative     Not on file     FAMILY HISTORY:  Family History   Problem Relation Age of Onset     Hypertension Father      Lupus Paternal Grandmother      Colon Cancer Paternal Grandfather      PHYSICAL EXAMINATION:  Constitutional: aaox3, cooperative, pleasant, not dyspneic/diaphoretic, no acute distress  Vitals reviewed: /78   Pulse 72   Temp 97.4  F (36.3  C) (Oral)   Ht 1.93 m (6' 4\")   Wt 90.4 kg (199 lb 3.2 oz)   SpO2 98%   BMI 24.25 kg/m     Wt:   Wt Readings from Last 2 Encounters:   12/18/18 90.4 kg (199 lb 3.2 oz)   08/20/18 88.6 kg (195 lb 4.8 oz)      Eyes: Sclera anicteric/injected  Ears/nose/mouth/throat: Normal oropharynx without ulcers or exudate, mucus membranes moist, hearing intact  Neck: supple, thyroid normal size  CV: No edema  Respiratory: Unlabored breathing  Lymph: No cervical lymphadenopathy  Abd: Nondistended, +bs, no hepatosplenomegaly, nontender, no peritoneal signs  Skin: warm, perfused, no jaundice  Psych: Normal affect  MSK: Normal gait    PERTINENT STUDIES:  Most recent CBC:  Recent Labs   Lab Test 12/18/18  0716 08/20/18  1615   WBC 4.9 5.3   HGB 14.0 14.6   HCT 42.5 45.8    242     Most recent " hepatic panel:  Recent Labs   Lab Test 12/18/18  0716 08/20/18  1615   ALT 48 144*   AST 37 149*     Most recent creatinine:  Recent Labs   Lab Test 12/18/18  0716 08/20/18  1615   CR 1.10 1.12       I performed a history and physical examination of the above patient and discussed the management with Dr. Kendrick on 12/18/2018. I reviewed the note and there are no changes to the past medical, family or social history.  A complete 10 point review of systems was obtained. Please see the HPI for pertinent positives and negatives. All other systems were reviewed and were found to be negative. I agree with the documented findings and plan of care as outlined with the following additions:    Discussed pros and cons of immune suppression and treating to endoscopic remission. Pros include better outcomes including decreased risk of flare, hospitalization, surgery and colon cancer. Cons include increased risk of infection.  Given that he was on infliximab but developed antibodies, I think his risk of flare is fairly high over next year.      Plan to start adalimumab in addition to thiopurine.  Will move his 2019 colonoscopy up to summer to survey for dysplasia and assess mucosal response to adalimumab.      Also will need an adalimumab concentration in about 2 months.      Jasen Watkins MD  GI Attending  Pager: [phone]

## 2018-12-18 NOTE — TELEPHONE ENCOUNTER
PA Initiation    Medication: HUMIRA   Insurance Company: [employer] - Phone [phone] Fax [phone]  Pharmacy Filling the Rx: [facility] - [place], [place] - [facility] [address]  Filling Pharmacy Phone:    Filling Pharmacy Fax:    Start Date: 12/18/2018

## 2018-12-18 NOTE — PATIENT INSTRUCTIONS
Great to see you today      Will work on prior for humira      Please call Cheyenne when you have received the humira      Labs today   crp and quantiferon gold.     Return colonoscopy and visit in 6 months   Please call Cheyenne  when the colonoscopy is scheduled.      Will sign you for the ambassador program and you will receive a call       Thanks Cheyenne Sams RN Care Coordinator for Dr. Watkins  Phone   [phone]     For questions regarding your care Monday through Friday, contact the RN GI care coordinator,  Call   [phone] . Your call will be  returned same day, or if consultation is needed with the provider, it may be following business day - or you may send a My Chart message.    For medication refills (prescribed by the GI clinic), contact your pharmacy.    For appointment rescheduling/cancellation, contact [phone]     After hours, or if you have an immediate GI concern and cannot wait for a return call, contact the GI Fellow at [phone] and select option #4.

## 2018-12-18 NOTE — PROGRESS NOTES
A/P  28 y M with PSC and UC. Chronic elevations in liver enzymes with biopsy last year showing just PSC. Labs are much improved with nearly normal values     Will get MRI in the next few weeks for CCA screening and assessment of PSC. Ok to get in [place]. Discussed screening in PSC.    Labs will be done with start of Humira and liver tests will be monitored.     RTC in 1 year    This was a 25 minute visit, over 50% counseling and coordination of care.   Graciela Herrera MD  Hepatology/Liver Transplant  Medical Director, Liver Transplantation  [facility]  ===================================================================  S: 28 y M with PSC diagnosed in 2014. Also has UC pancolitis and celiac. Saw Dr. Watkins this morning. He is here with his dad today.     He feels well. No fever, no abdominal pain, no diarrhea, no blood in stool, good appetite. no weight loss.      PSC  -dx at [facility] through MRI  -Never required ERCP  -No episodes of cholangitis  -Alk phos up to 800s, was around 300 chronically, now 178  -Normal platelet count, normal kidney function.    -Liver biopsy 9/2017 showed PSC, no inflammation, read by Dr. Marte. LFTS at that time were   AST 99  -MRI done 8/8/17 in [place] showed findings c/w PSC and no mass.      UC  -On vedolizumab 8/16-8/2017 and it was stopped 2/2 ongoing inflammation on colonoscopy  -Dr. Watkins saw him this am. He is in clinical remission. 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     EXAM  124/78 72 97.4 98% 199 BMI 24.3  GENERAL:  Pleasant, in no acute distress.   HEENT:  No icterus, no oral lesions.   LYMPH:  No supraclavicular  or cervical lymphadenopathy.   CARDIOVASCULAR:  Regular rate and rhythm.   CHEST:  Lungs are clear.   ABDOMEN:  Bowel sounds are present, soft, nontender, nondistended, no hepatosplenomegaly.   EXTREMITIES:  No edema.   SKIN:  No rash.   NEUROLOGIC:  Speech is fluent and clear.  No asterixis or tremor.

## 2018-12-18 NOTE — PROGRESS NOTES
I performed a history and physical examination of the above patient and discussed the management with Dr. Kendrick on 12/18/2018. I reviewed the note and there are no changes to the past medical, family or social history.  A complete 10 point review of systems was obtained. Please see the HPI for pertinent positives and negatives. All other systems were reviewed and were found to be negative. I agree with the documented findings and plan of care as outlined with the following additions:    Discussed pros and cons of immune suppression and treating to endoscopic remission. Pros include better outcomes including decreased risk of flare, hospitalization, surgery and colon cancer. Cons include increased risk of infection.  Given that he was on infliximab but developed antibodies, I think his risk of flare is fairly high over next year.      Plan to start adalimumab in addition to thiopurine.  Will move his 2019 colonoscopy up to summer to survey for dysplasia and assess mucosal response to adalimumab.      Also will need an adalimumab concentration in about 2 months.      Jasen Watkins MD  GI Attending  Pager: [phone]

## 2018-12-18 NOTE — NURSING NOTE
"Chief Complaint   Patient presents with     RECHECK     Return IBD, post colonoscopy follow up       Vitals:    12/18/18 0738   BP: 124/78   Pulse: 72   Temp: 97.4  F (36.3  C)   TempSrc: Oral   SpO2: 98%   Weight: 90.4 kg (199 lb 3.2 oz)   Height: 1.93 m (6' 4\")       Body mass index is 24.25 kg/m .    WILLIAN Tam                          "

## 2018-12-18 NOTE — LETTER
12/18/2018      RE: Uriah Mortensen  [address] [place] [address]       IBD CLINIC VISIT     CC/REFERRING MD:  Elsie Rildey  REASON FOR CONSULTATION: Ulcerative colitis-PSC    IBD HISTORY  Age at diagnosis: 23  Extent of disease: Pancolitis   Current UC medications:   - Azathioprine 50 mg daily: Started 2018  - Lialda 4.8g daily   Prior IBD surgeries: None  Prior IBD Medications:  - Vedolizumab (8/2016 - 8/2017) stopped due to ongoing inflammation on colonoscopy (not clear if drug/Ab levels were checked)  - Infliximab-developed antibodies (8/2018)  - Budesonide  - Asacol     DRUG MONITORING  TPMT enzyme activity: 29.4 (8/20/18)    6-TGN/6-MMPN levels: NA    Biologic concentration:  Infliximab 7/25/18-drug level 0, antibody greater than 200    DISEASE ASSESSMENT  Labs:  Recent Labs   Lab Test 08/20/18  1615 08/11/17  1005   CRP <2.9 <2.9   SED 8  --      Endoscopic assessment:   Colonoscopy 12/17/18- patchy erythema, edema, ulceration, and friability in the cecum, ascending, transverse colon.  With relative sparing of right side of the colon.  TI was normal.  Scarring in the rectum.  3 mm polyp removed.  Biopsies taken throughout.  Enterography: NA  Fecal calprotectin: NA  C diff: NA  Woody score: 0/9 12/18/2018     ASSESSMENT/PLAN  28-year-old man with PSC colitis on Azathioprine and Lialda who presents for follow-up.    1. Colitis: Likely PSC colitis- Currently, remains in clinical remission, but has endoscopic evidence of active disease.  Given his higher risk for colorectal cancer, plan to escalate immunosuppression to biologic therapy with adalimumab.  - Start adalimumab  - Continue azathioprine 175mg daily  - Continue lialda 4.8g daily   - Continue multivitamin, including calcium and vit D    2. PSC: Followed by Dr. Herrera of the Liver center.   - Continue PSC care by Dr. Herrera through Liver center      3. Celiac disease: Per patient diagnosed at [facility], but path unavailable. Celiac  serologies normal in [facility] records.  Duodenal biopsies from 8/14/17 normal (presumably on gluten free diet). Will have cramping and diarrhea if he accidentally eats gluten.  - Tentative plan to continue gluten free diet for now.     4. Eosinophilic esophagitis: Unclear if he has EoE or reactive changes related to reflux.  Denies any dysphagia.  Had > 15 eosinophils/hpf on distal esophageal biopsies 12/17/18, but biopsies from mid-esophagus were normal.    - Continue PPI therapy      IBD Health Care Maintenance:    Vaccinations:  All patients on biologics should avoid live vaccines.    -- Influenza (every year)  -- TdaP (every 10 years)  -- Pneumococcal Pneumonia (once plus booster at 5 years)  -- Prevnar 13  -- Yearly assessment for latent Tb (verbal screening and exam, PPD or QuantiFERON-Tb testing)    One time confirmation of immunity or serologies:  -- Hepatitis A (serologies or immunizations)  -- Hepatitis B (serologies or immunizations)  -- Varicella  -- MMR  -- HPV (all aged 18-26)  -- Meningococcal meningitis (all patients at risk for meningitis)    Bone mineral density screening   -- Recommend all patients supplement with calcium and vitamin D    Cancer Screening:  Colon cancer screening:  Given PSC colitis, recommend patient undergo regular yearly dysplasia surveillance   Next dysplasia screening is recommended December 2018.    Skin cancer screening: Annual visual exam of skin by dermatologist since patient is immunocompromised    Misc:  -- Avoid tobacco use  -- Avoid NSAIDs as there is potentially a 25% chance of causing an IBD flare    Return to clinic in 5 months as previously scheduled    Thank you for this consultation.  It was a pleasure to participate in the care of this patient; please contact us with any further questions.     Patient was seen and discussed with attending, Dr. Watkins.    Luisana Kendrick MD  GI Fellow    HPI:   Currently, doing well with his normal 2-3 brown, formed bowel movements per  day with no melena or hematochezia.  He denies abdominal pain.  No fever, chills, weight change, rash, joint pain, uveitis, aphthous ulcers.  He has been off budesonide since mid July.  He continues azathioprine 50 mg per day and mesalamine 4.8 g per day without any side effects.  He denies NSAID use.  He denies smoking.    ROS:    No fevers or chills  No weight loss  No blurry vision, double vision or change in vision  No sore throat  No lymphadenopathy  No headache, paraesthesias, or weakness in a limb  No shortness of breath or wheezing  No chest pain or pressure  No arthralgias or myalgias  No rashes or skin changes  No odynophagia or dysphagia  No BRBPR, hematochezia, melena  No dysuria, frequency or urgency  No hot/cold intolerance  No anxiety or depression    Extra intestinal manifestations of IBD:  No uveitis/episcleritis  No aphthous ulcers   No arthritis   No erythema nodosum/pyoderma gangrenosum.     PERTINENT PAST MEDICAL HISTORY:  Past Medical History:   Diagnosis Date     Celiac disease      PSC (primary sclerosing cholangitis)      Ulcerative colitis ([facility])      PREVIOUS SURGERIES:  No past surgical history on file.    PREVIOUS ENDOSCOPY:  Colonoscopy 4/11/18-granularity and loss of vascularity found in a continuous of circumferential pattern from the transverse colon to the hepatic flexure-mild.  Altered vascularity, erythema, granularity, mucus and shallow ulcerations from rectum to splenic flexure?.  TI appeared normal    Pathology 8/14/17  Final Pathologic Diagnosis   A. Terminal ileum, biopsy -- Small bowel mucosa with no significant   pathologic abnormality.   B. Colon, cecum and ascending, biopsies --       1.   Focal mildly active chronic colitis and pseudopolyp.         2.  No dysplasia seen.   C. Colon, transverse, biopsies --       1.  Marked architectural disorder and pseudopolyp with minimal   active colitis seen.       2.  No dysplasia seen.    D. Colon, descending, biopsy --        1.  Chronic colitis with focal mild activity.       2.  No dysplasia seen.   E. Colon, sigmoid, biopsy --       1.  Chronic colitis with mild activity.       2.  No dysplasia seen.   F. Colon, rectum, biopsy --       1.  Chronic colitis with minimal activity.       2.  No dysplasia seen.   G. Duodenum, biopsy --  Small bowel mucosa with no significant   pathologic abnormality.              H. Stomach, body and antrum, biopsy --        1.  Changes consistent with reactive gastropathy       2.  Negative for H. pylori   I. Esophagus, lower, biopsy --       1.   Squamous mucosa with nonspecific reactive changes.        2.   No eosinophilia or goblet cell metaplasia is seen.   J. Esophagus, middle, biopsy -- squamous mucosa with no significant   pathologic abnormality.     ALLERGIES:   Allergies   Allergen Reactions     Fish-Derived Products Hives     Positive skin test to fish and shellfish on 9/14/2016.     Shellfish Allergy Hives, Itching and Shortness Of Breath     Gluten Meal      PERTINENT MEDICATIONS:    Current Outpatient Medications:      azaTHIOprine (IMURAN) 50 MG tablet, Take 3.5 tablets (175 mg) by mouth daily, Disp: 90 tablet, Rfl: 3     EPINEPHrine (EPIPEN 2-NAGI) 0.3 MG/0.3ML injection 2-pack, Inject when needed for emergency treatment of severe allergic reactions (anaphylaxis)., Disp: , Rfl:      mesalamine (LIALDA) 1.2 g EC tablet, Take 4 tablets (4.8 g) by mouth daily, Disp: 180 tablet, Rfl: 3    SOCIAL HISTORY:  Social History     Socioeconomic History     Marital status: Single     Spouse name: Not on file     Number of children: Not on file     Years of education: Not on file     Highest education level: Not on file   Social Needs     Financial resource strain: Not on file     Food insecurity - worry: Not on file     Food insecurity - inability: Not on file     Transportation needs - medical: Not on file     Transportation needs - non-medical: Not on file   Occupational History     Not on file  "  Tobacco Use     Smoking status: Never Smoker     Smokeless tobacco: Never Used   Substance and Sexual Activity     Alcohol use: No     Alcohol/week: 0.0 oz     Comment: None     Drug use: No     Sexual activity: Not on file   Other Topics Concern     Not on file   Social History Narrative     Not on file     FAMILY HISTORY:  Family History   Problem Relation Age of Onset     Hypertension Father      Lupus Paternal Grandmother      Colon Cancer Paternal Grandfather      PHYSICAL EXAMINATION:  Constitutional: aaox3, cooperative, pleasant, not dyspneic/diaphoretic, no acute distress  Vitals reviewed: /78   Pulse 72   Temp 97.4  F (36.3  C) (Oral)   Ht 1.93 m (6' 4\")   Wt 90.4 kg (199 lb 3.2 oz)   SpO2 98%   BMI 24.25 kg/m     Wt:   Wt Readings from Last 2 Encounters:   12/18/18 90.4 kg (199 lb 3.2 oz)   08/20/18 88.6 kg (195 lb 4.8 oz)      Eyes: Sclera anicteric/injected  Ears/nose/mouth/throat: Normal oropharynx without ulcers or exudate, mucus membranes moist, hearing intact  Neck: supple, thyroid normal size  CV: No edema  Respiratory: Unlabored breathing  Lymph: No cervical lymphadenopathy  Abd: Nondistended, +bs, no hepatosplenomegaly, nontender, no peritoneal signs  Skin: warm, perfused, no jaundice  Psych: Normal affect  MSK: Normal gait    PERTINENT STUDIES:  Most recent CBC:  Recent Labs   Lab Test 12/18/18  0716 08/20/18  1615   WBC 4.9 5.3   HGB 14.0 14.6   HCT 42.5 45.8    242     Most recent hepatic panel:  Recent Labs   Lab Test 12/18/18  0716 08/20/18  1615   ALT 48 144*   AST 37 149*     Most recent creatinine:  Recent Labs   Lab Test 12/18/18  0716 08/20/18  1615   CR 1.10 1.12       I performed a history and physical examination of the above patient and discussed the management with Dr. Kendrick on 12/18/2018. I reviewed the note and there are no changes to the past medical, family or social history.  A complete 10 point review of systems was obtained. Please see the HPI for " pertinent positives and negatives. All other systems were reviewed and were found to be negative. I agree with the documented findings and plan of care as outlined with the following additions:    Discussed pros and cons of immune suppression and treating to endoscopic remission. Pros include better outcomes including decreased risk of flare, hospitalization, surgery and colon cancer. Cons include increased risk of infection.  Given that he was on infliximab but developed antibodies, I think his risk of flare is fairly high over next year.      Plan to start adalimumab in addition to thiopurine.  Will move his 2019 colonoscopy up to summer to survey for dysplasia and assess mucosal response to adalimumab.      Also will need an adalimumab concentration in about 2 months.      Jasen Watkins MD  GI Attending  Pager: [phone]      Jasen Watkins MD

## 2018-12-18 NOTE — PROGRESS NOTES
IBD CLINIC VISIT     CC/REFERRING MD:  Elsie Ridley  REASON FOR CONSULTATION: Ulcerative colitis-PSC    IBD HISTORY  Age at diagnosis: 23  Extent of disease: Pancolitis   Current UC medications:   - Azathioprine 50 mg daily: Started 2018  - Lialda 4.8g daily   Prior IBD surgeries: None  Prior IBD Medications:  - Vedolizumab (8/2016 - 8/2017) stopped due to ongoing inflammation on colonoscopy (not clear if drug/Ab levels were checked)  - Infliximab-developed antibodies (8/2018)  - Budesonide  - Asacol     DRUG MONITORING  TPMT enzyme activity: 29.4 (8/20/18)    6-TGN/6-MMPN levels: NA    Biologic concentration:  Infliximab 7/25/18-drug level 0, antibody greater than 200    DISEASE ASSESSMENT  Labs:  Recent Labs   Lab Test 08/20/18  1615 08/11/17  1005   CRP <2.9 <2.9   SED 8  --      Endoscopic assessment:   Colonoscopy 12/17/18- patchy erythema, edema, ulceration, and friability in the cecum, ascending, transverse colon.  With relative sparing of right side of the colon.  TI was normal.  Scarring in the rectum.  3 mm polyp removed.  Biopsies taken throughout.  Enterography: NA  Fecal calprotectin: NA  C diff: NA  Woody score: 0/9 12/18/2018     ASSESSMENT/PLAN  28-year-old man with PSC colitis on Azathioprine and Lialda who presents for follow-up.    1. Colitis: Likely PSC colitis- Currently, remains in clinical remission, but has endoscopic evidence of active disease.  Given his higher risk for colorectal cancer, plan to escalate immunosuppression to biologic therapy with adalimumab.  - Start adalimumab  - Continue azathioprine 175mg daily  - Continue lialda 4.8g daily   - Continue multivitamin, including calcium and vit D    2. PSC: Followed by Dr. Herrera [facility].   - Continue PSC care by Dr. Herrera through [facility] center      3. Celiac disease: Per patient diagnosed at [facility], but path unavailable. Celiac serologies normal in [facility] records.  Duodenal biopsies from 8/14/17 normal (presumably on gluten  free diet). Will have cramping and diarrhea if he accidentally eats gluten.  - Tentative plan to continue gluten free diet for now.     4. Eosinophilic esophagitis: Unclear if he has EoE or reactive changes related to reflux.  Denies any dysphagia.  Had > 15 eosinophils/hpf on distal esophageal biopsies 12/17/18, but biopsies from mid-esophagus were normal.    - Continue PPI therapy      IBD Health Care Maintenance:    Vaccinations:  All patients on biologics should avoid live vaccines.    -- Influenza (every year)  -- TdaP (every 10 years)  -- Pneumococcal Pneumonia (once plus booster at 5 years)  -- Prevnar 13  -- Yearly assessment for latent Tb (verbal screening and exam, PPD or QuantiFERON-Tb testing)    One time confirmation of immunity or serologies:  -- Hepatitis A (serologies or immunizations)  -- Hepatitis B (serologies or immunizations)  -- Varicella  -- MMR  -- HPV (all aged 18-26)  -- Meningococcal meningitis (all patients at risk for meningitis)    Bone mineral density screening   -- Recommend all patients supplement with calcium and vitamin D    Cancer Screening:  Colon cancer screening:  Given PSC colitis, recommend patient undergo regular yearly dysplasia surveillance   Next dysplasia screening is recommended December 2018.    Skin cancer screening: Annual visual exam of skin by dermatologist since patient is immunocompromised    Misc:  -- Avoid tobacco use  -- Avoid NSAIDs as there is potentially a 25% chance of causing an IBD flare    Return to clinic in 5 months as previously scheduled    Thank you for this consultation.  It was a pleasure to participate in the care of this patient; please contact us with any further questions.     Patient was seen and discussed with attending, Dr. Watkins.    Luisana Kendrick MD  GI Fellow    HPI:   Currently, doing well with his normal 2-3 brown, formed bowel movements per day with no melena or hematochezia.  He denies abdominal pain.  No fever, chills, weight  change, rash, joint pain, uveitis, aphthous ulcers.  He has been off budesonide since mid July.  He continues azathioprine 50 mg per day and mesalamine 4.8 g per day without any side effects.  He denies NSAID use.  He denies smoking.    ROS:    No fevers or chills  No weight loss  No blurry vision, double vision or change in vision  No sore throat  No lymphadenopathy  No headache, paraesthesias, or weakness in a limb  No shortness of breath or wheezing  No chest pain or pressure  No arthralgias or myalgias  No rashes or skin changes  No odynophagia or dysphagia  No BRBPR, hematochezia, melena  No dysuria, frequency or urgency  No hot/cold intolerance  No anxiety or depression    Extra intestinal manifestations of IBD:  No uveitis/episcleritis  No aphthous ulcers   No arthritis   No erythema nodosum/pyoderma gangrenosum.     PERTINENT PAST MEDICAL HISTORY:  Past Medical History:   Diagnosis Date     Celiac disease      PSC (primary sclerosing cholangitis)      Ulcerative colitis (H)      PREVIOUS SURGERIES:  No past surgical history on file.    PREVIOUS ENDOSCOPY:  Colonoscopy 4/11/18-granularity and loss of vascularity found in a continuous of circumferential pattern from the transverse colon to the hepatic flexure-mild.  Altered vascularity, erythema, granularity, mucus and shallow ulcerations from rectum to splenic flexure?.  TI appeared normal    Pathology 8/14/17  Final Pathologic Diagnosis   A. Terminal ileum, biopsy -- Small bowel mucosa with no significant   pathologic abnormality.   B. Colon, cecum and ascending, biopsies --       1.   Focal mildly active chronic colitis and pseudopolyp.         2.  No dysplasia seen.   C. Colon, transverse, biopsies --       1.  Marked architectural disorder and pseudopolyp with minimal   active colitis seen.       2.  No dysplasia seen.    D. Colon, descending, biopsy --       1.  Chronic colitis with focal mild activity.       2.  No dysplasia seen.   E. Colon, sigmoid,  biopsy --       1.  Chronic colitis with mild activity.       2.  No dysplasia seen.   F. Colon, rectum, biopsy --       1.  Chronic colitis with minimal activity.       2.  No dysplasia seen.   G. Duodenum, biopsy --  Small bowel mucosa with no significant   pathologic abnormality.              H. Stomach, body and antrum, biopsy --        1.  Changes consistent with reactive gastropathy       2.  Negative for H. pylori   I. Esophagus, lower, biopsy --       1.   Squamous mucosa with nonspecific reactive changes.        2.   No eosinophilia or goblet cell metaplasia is seen.   J. Esophagus, middle, biopsy -- squamous mucosa with no significant   pathologic abnormality.     ALLERGIES:   Allergies   Allergen Reactions     Fish-Derived Products Hives     Positive skin test to fish and shellfish on 9/14/2016.     Shellfish Allergy Hives, Itching and Shortness Of Breath     Gluten Meal      PERTINENT MEDICATIONS:    Current Outpatient Medications:      azaTHIOprine (IMURAN) 50 MG tablet, Take 3.5 tablets (175 mg) by mouth daily, Disp: 90 tablet, Rfl: 3     EPINEPHrine (EPIPEN 2-NAGI) 0.3 MG/0.3ML injection 2-pack, Inject when needed for emergency treatment of severe allergic reactions (anaphylaxis)., Disp: , Rfl:      mesalamine (LIALDA) 1.2 g EC tablet, Take 4 tablets (4.8 g) by mouth daily, Disp: 180 tablet, Rfl: 3    SOCIAL HISTORY:  Social History     Socioeconomic History     Marital status: Single     Spouse name: Not on file     Number of children: Not on file     Years of education: Not on file     Highest education level: Not on file   Social Needs     Financial resource strain: Not on file     Food insecurity - worry: Not on file     Food insecurity - inability: Not on file     Transportation needs - medical: Not on file     Transportation needs - non-medical: Not on file   Occupational History     Not on file   Tobacco Use     Smoking status: Never Smoker     Smokeless tobacco: Never Used   Substance and  "Sexual Activity     Alcohol use: No     Alcohol/week: 0.0 oz     Comment: None     Drug use: No     Sexual activity: Not on file   Other Topics Concern     Not on file   Social History Narrative     Not on file     FAMILY HISTORY:  Family History   Problem Relation Age of Onset     Hypertension Father      Lupus Paternal Grandmother      Colon Cancer Paternal Grandfather      PHYSICAL EXAMINATION:  Constitutional: aaox3, cooperative, pleasant, not dyspneic/diaphoretic, no acute distress  Vitals reviewed: /78   Pulse 72   Temp 97.4  F (36.3  C) (Oral)   Ht 1.93 m (6' 4\")   Wt 90.4 kg (199 lb 3.2 oz)   SpO2 98%   BMI 24.25 kg/m    Wt:   Wt Readings from Last 2 Encounters:   12/18/18 90.4 kg (199 lb 3.2 oz)   08/20/18 88.6 kg (195 lb 4.8 oz)      Eyes: Sclera anicteric/injected  Ears/nose/mouth/throat: Normal oropharynx without ulcers or exudate, mucus membranes moist, hearing intact  Neck: supple, thyroid normal size  CV: No edema  Respiratory: Unlabored breathing  Lymph: No cervical lymphadenopathy  Abd: Nondistended, +bs, no hepatosplenomegaly, nontender, no peritoneal signs  Skin: warm, perfused, no jaundice  Psych: Normal affect  MSK: Normal gait    PERTINENT STUDIES:  Most recent CBC:  Recent Labs   Lab Test 12/18/18  0716 08/20/18  1615   WBC 4.9 5.3   HGB 14.0 14.6   HCT 42.5 45.8    242     Most recent hepatic panel:  Recent Labs   Lab Test 12/18/18  0716 08/20/18  1615   ALT 48 144*   AST 37 149*     Most recent creatinine:  Recent Labs   Lab Test 12/18/18  0716 08/20/18  1615   CR 1.10 1.12     "

## 2018-12-19 LAB
COPATH REPORT: NORMAL
GAMMA INTERFERON BACKGROUND BLD IA-ACNC: 0.05 IU/ML
M TB IFN-G BLD-IMP: NEGATIVE
M TB IFN-G CD4+ BCKGRND COR BLD-ACNC: >10 IU/ML
MITOGEN IGNF BCKGRD COR BLD-ACNC: 0 IU/ML
MITOGEN IGNF BCKGRD COR BLD-ACNC: 0 IU/ML

## 2018-12-21 ENCOUNTER — PATIENT OUTREACH (OUTPATIENT)
Dept: GASTROENTEROLOGY | Facility: CLINIC | Age: 28
End: 2018-12-21

## 2018-12-28 ENCOUNTER — PATIENT OUTREACH (OUTPATIENT)
Dept: GASTROENTEROLOGY | Facility: CLINIC | Age: 28
End: 2018-12-28

## 2018-12-28 NOTE — PROGRESS NOTES
Patient and father on the phone. Pt has received his humira.  Pt said that he was contacted by a humira ambassador and was told that they could talk him through the injections.  Told pt and father that I would not endorse this nor would our clinic. Discussed the importance of sterile technique, site selection, and  Proper injection.  Father said that they will contact pharmacist in [facility].  Contacted the humira ambassador program to check if that is there policy to let pt's self injection without observation. Confirmed that yes they do give the pt this option but usually only if the pt does not want anyone in there home.  Voiced my concern with this practice. [facility] does have nurses that do go to the home but pt would need to request.  Pt was given the number to call if the pharmacist near his home would not be able to facilitate.  Number to call is .

## 2019-01-03 ENCOUNTER — PATIENT OUTREACH (OUTPATIENT)
Dept: GASTROENTEROLOGY | Facility: CLINIC | Age: 29
End: 2019-01-03

## 2019-01-03 DIAGNOSIS — K51.00 ULCERATIVE PANCOLITIS (H): Primary | ICD-10-CM

## 2019-01-03 NOTE — PROGRESS NOTES
Called [facility] lab about lab needed for pt.  humira levels at their facility are sent to [facility].    Faxed the orders for humira level and standing lab order to [facility] lab.   Faxed to [phone]

## 2019-01-04 ENCOUNTER — TELEPHONE (OUTPATIENT)
Dept: GASTROENTEROLOGY | Facility: CLINIC | Age: 29
End: 2019-01-04

## 2019-01-04 NOTE — TELEPHONE ENCOUNTER
Called out to [facility] after receiving fax requesting patient's contact phone number, spoke with Nimo advising to contact patient's father Reece-per chart note when needed, giving phone number.

## 2019-01-21 ENCOUNTER — TRANSFERRED RECORDS (OUTPATIENT)
Dept: HEALTH INFORMATION MANAGEMENT | Facility: CLINIC | Age: 29
End: 2019-01-21

## 2019-01-22 ENCOUNTER — PATIENT OUTREACH (OUTPATIENT)
Dept: GASTROENTEROLOGY | Facility: CLINIC | Age: 29
End: 2019-01-22

## 2019-01-22 DIAGNOSIS — K51.018 ULCERATIVE PANCOLITIS WITH OTHER COMPLICATION (H): ICD-10-CM

## 2019-01-22 DIAGNOSIS — K51.00 ULCERATIVE PANCOLITIS WITHOUT COMPLICATION (H): ICD-10-CM

## 2019-01-22 RX ORDER — MESALAMINE 1.2 G/1
4.8 TABLET, DELAYED RELEASE ORAL DAILY
Qty: 120 TABLET | Refills: 5 | Status: CANCELLED | OUTPATIENT
Start: 2019-01-22

## 2019-01-22 RX ORDER — AZATHIOPRINE 50 MG/1
2 TABLET ORAL DAILY
Qty: 105 TABLET | Refills: 5 | Status: SHIPPED | OUTPATIENT
Start: 2019-01-22 | End: 2021-01-10

## 2019-01-22 RX ORDER — AZATHIOPRINE 50 MG/1
2 TABLET ORAL DAILY
Qty: 105 TABLET | Refills: 5 | Status: SHIPPED | OUTPATIENT
Start: 2019-01-22 | End: 2019-01-22

## 2019-01-25 ENCOUNTER — TRANSFERRED RECORDS (OUTPATIENT)
Dept: HEALTH INFORMATION MANAGEMENT | Facility: CLINIC | Age: 29
End: 2019-01-25

## 2019-01-25 ENCOUNTER — PATIENT OUTREACH (OUTPATIENT)
Dept: GASTROENTEROLOGY | Facility: CLINIC | Age: 29
End: 2019-01-25

## 2019-01-25 NOTE — PROGRESS NOTES
Pt sent a my chart message that he is due for his humira today but will not be delivered until Tuesday. Called specialty pharmacy and pharmacy had been trying to reach pt and reached pt. Misunderstanding when pt was due for his humira as pt is due today. Will send out urgently via ups with delivery tomorrow. Father of pt aware. Suggested that they make sure to contact pharmacy for refills at least one week prior to needed next injection and return voice mail messages. Pt having azathioprine monitoring labs today.

## 2019-01-30 DIAGNOSIS — K51.00 ULCERATIVE PANCOLITIS WITHOUT COMPLICATION (H): ICD-10-CM

## 2019-01-30 RX ORDER — MESALAMINE 1.2 G/1
4.8 TABLET, DELAYED RELEASE ORAL DAILY
Qty: 180 TABLET | Refills: 3 | Status: SHIPPED | OUTPATIENT
Start: 2019-01-30 | End: 2019-06-11

## 2019-01-31 ENCOUNTER — DOCUMENTATION ONLY (OUTPATIENT)
Dept: GASTROENTEROLOGY | Facility: CLINIC | Age: 29
End: 2019-01-31

## 2019-02-01 ENCOUNTER — DOCUMENTATION ONLY (OUTPATIENT)
Dept: GASTROENTEROLOGY | Facility: CLINIC | Age: 29
End: 2019-02-01

## 2019-02-21 ENCOUNTER — PATIENT OUTREACH (OUTPATIENT)
Dept: GASTROENTEROLOGY | Facility: CLINIC | Age: 29
End: 2019-02-21

## 2019-02-21 NOTE — PROGRESS NOTES
Returned voice mail message from father of pt. .  Pt to have humira level two months after starting humira. First dose was December 29. Will do right before his dose on March 9.

## 2019-03-08 ENCOUNTER — TRANSFERRED RECORDS (OUTPATIENT)
Dept: HEALTH INFORMATION MANAGEMENT | Facility: CLINIC | Age: 29
End: 2019-03-08

## 2019-03-19 ENCOUNTER — PATIENT OUTREACH (OUTPATIENT)
Dept: GASTROENTEROLOGY | Facility: CLINIC | Age: 29
End: 2019-03-19

## 2019-03-19 NOTE — PROGRESS NOTES
Father calling to find out the humira level. Told him do not have yet. Called [facility] lab and will fax.

## 2019-03-21 ENCOUNTER — DOCUMENTATION ONLY (OUTPATIENT)
Dept: GASTROENTEROLOGY | Facility: CLINIC | Age: 29
End: 2019-03-21

## 2019-03-21 NOTE — PROGRESS NOTES
Humira level received from [facility] Level is 7.3 and no antibodies.  Father wondering what is the next step?  Routed to Dr. Watkins.

## 2019-03-27 ENCOUNTER — TELEPHONE (OUTPATIENT)
Dept: GASTROENTEROLOGY | Facility: CLINIC | Age: 29
End: 2019-03-27

## 2019-03-27 ENCOUNTER — PATIENT OUTREACH (OUTPATIENT)
Dept: GASTROENTEROLOGY | Facility: CLINIC | Age: 29
End: 2019-03-27

## 2019-03-27 DIAGNOSIS — K51.90 ULCERATIVE COLITIS (H): Primary | ICD-10-CM

## 2019-03-27 NOTE — TELEPHONE ENCOUNTER
Called out to [facility], spoke with Koko to advise faxing lab orders for Calprotectin, faxed to #[phone]. Have sent [email or web address] message advising him of this.

## 2019-03-28 ENCOUNTER — PATIENT OUTREACH (OUTPATIENT)
Dept: GASTROENTEROLOGY | Facility: CLINIC | Age: 29
End: 2019-03-28

## 2019-03-29 ENCOUNTER — TRANSFERRED RECORDS (OUTPATIENT)
Dept: HEALTH INFORMATION MANAGEMENT | Facility: CLINIC | Age: 29
End: 2019-03-29

## 2019-04-05 ENCOUNTER — PATIENT OUTREACH (OUTPATIENT)
Dept: GASTROENTEROLOGY | Facility: CLINIC | Age: 29
End: 2019-04-05

## 2019-04-06 ENCOUNTER — DOCUMENTATION ONLY (OUTPATIENT)
Dept: GASTROENTEROLOGY | Facility: CLINIC | Age: 29
End: 2019-04-06

## 2019-04-07 NOTE — PROGRESS NOTES
Humira level 7.3 every 14 days on azathioprine 175mg a day and mesalamine 2.4g per day    Fecal valentin eleveated ~500.     Consider increasing Humira to weekly versus waiting to assess inflammation at time of colonoscopy.

## 2019-04-11 ENCOUNTER — PATIENT OUTREACH (OUTPATIENT)
Dept: GASTROENTEROLOGY | Facility: CLINIC | Age: 29
End: 2019-04-11

## 2019-04-11 DIAGNOSIS — K51.90 ULCERATIVE COLITIS (H): Primary | ICD-10-CM

## 2019-04-11 NOTE — PROGRESS NOTES
Switched prescription  to humira every 7 days due to elevated calprotectin.   In basket to pharmacy liaison.  Will contact pt about taking 4.8 Lialda daily.

## 2019-04-12 ENCOUNTER — TELEPHONE (OUTPATIENT)
Dept: GASTROENTEROLOGY | Facility: CLINIC | Age: 29
End: 2019-04-12

## 2019-04-12 NOTE — TELEPHONE ENCOUNTER
PRIOR AUTHORIZATION DENIED    Medication: Humira - weekly dosing - Denied     Denial Date: 4/12/2019    Denial Rational:  See letter     Appeal Information:  See letter

## 2019-04-12 NOTE — TELEPHONE ENCOUNTER
PA Initiation    Medication: Humira - weekly dosing   Insurance Company: [employer] - Phone [phone] Fax [phone]  Pharmacy Filling the Rx: [facility] - [place], [place] - [facility] [address]  Filling Pharmacy Phone:    Filling Pharmacy Fax:    Start Date: 4/12/2019

## 2019-04-15 ENCOUNTER — PATIENT OUTREACH (OUTPATIENT)
Dept: GASTROENTEROLOGY | Facility: CLINIC | Age: 29
End: 2019-04-15

## 2019-04-15 NOTE — PROGRESS NOTES
Working on appeal for humira weekly as denied. Talked to father of patient to let him know working on appeal. Pt has started taking 4.8 lialda again.

## 2019-04-19 ENCOUNTER — PATIENT OUTREACH (OUTPATIENT)
Dept: GASTROENTEROLOGY | Facility: CLINIC | Age: 29
End: 2019-04-19

## 2019-05-08 ENCOUNTER — TELEPHONE (OUTPATIENT)
Dept: GASTROENTEROLOGY | Facility: CLINIC | Age: 29
End: 2019-05-08

## 2019-05-08 NOTE — TELEPHONE ENCOUNTER
Referring Provider: Jasen Watkins MD    What is your current height? 6'4    What is your current weight?199    Are you on daily home oxygen? no    Have you had a heart or lung transplant? no      In the past year, have you had any heart related issues  Including cardiomyopathy or a MI within 6 months, that required cardiac stenting or other implantable devices? no    What type of implantable device do you have? no    Do you take nitroglycerin? If yes, how often? no    Are you currently taking any blood thinners?no      (Females) Are you currently pregnant? no  If yes, how many weeks?      Have you had a procedure in the past that was difficult to tolerate with conscious sedation? Any allergies to Fentanyl or Versed no        Do you currently use any of the following?    Are you taking any scheduled prescription narcotics more than once daily? no    Drink alcohol daily? no    Currently using any street drugs or methadone?no    Do you have any history of post-traumatic stress syndrome or mental health issues? no

## 2019-05-23 DIAGNOSIS — K83.01 PSC (PRIMARY SCLEROSING CHOLANGITIS) (H): Primary | ICD-10-CM

## 2019-05-29 ENCOUNTER — TELEPHONE (OUTPATIENT)
Dept: GASTROENTEROLOGY | Facility: CLINIC | Age: 29
End: 2019-05-29

## 2019-05-29 DIAGNOSIS — K51.00 ULCERATIVE PANCOLITIS (H): Primary | ICD-10-CM

## 2019-05-29 NOTE — TELEPHONE ENCOUNTER
Order Questions     Question Answer Comment   Procedure Colonoscopy with dr fallon  on leighann 3    Purpose of Procedure Screening Ulcerative pancolitis with other complication    Does the patient have the following? Primary sclerosing Cholangitis    Is the patient on the following medications? No blood thinners    Referring MD Dr Fallon

## 2019-05-29 NOTE — TELEPHONE ENCOUNTER
Patient scheduled for Colonoscopy    Indication for procedure. Ulcerative pancolitis with other complication (H)    Referring Provider. JARROD GOMEZ     ? No     Arrival time verified? 8:45 AM    Facility location verified? [address] [place] [address]    Instructions given regarding prep and procedure patient declined review    Prep Type Golytely    Are you taking any anticoagulants or blood thinners? No     Instructions given? N/a     Electronic implanted devices? Denies     Pre procedure teaching completed? Yes    Transportation from procedure?  policy reviewed. Instructed patient to have someone stay with him for 6 hours post exam    H&P / Pre op physical completed? N/a

## 2019-06-03 ENCOUNTER — TELEPHONE (OUTPATIENT)
Dept: GASTROENTEROLOGY | Facility: CLINIC | Age: 29
End: 2019-06-03

## 2019-06-03 NOTE — TELEPHONE ENCOUNTER
[facility] Call Center    Phone Message    May a detailed message be left on voicemail: yes    Reason for Call: Other: pt wants to know if there are any labs that needs to be done prior to his visit and if so since pt willbe down this week for colonoscopy could he have orders made to have them a that time. Please call back with an update.      Action Taken: Message routed to:  [facility] ([facility] hep

## 2019-06-03 NOTE — TELEPHONE ENCOUNTER
Called and spoke with patient reminding him of appointment for 6/11/19 at 8:40AM. Patient also confirmed colonoscopy for 6/5/19 at other appointment for 6/11/19 at 10AM.

## 2019-06-04 ENCOUNTER — TELEPHONE (OUTPATIENT)
Dept: GASTROENTEROLOGY | Facility: CLINIC | Age: 29
End: 2019-06-04

## 2019-06-05 ENCOUNTER — HOSPITAL ENCOUNTER (OUTPATIENT)
Facility: AMBULATORY SURGERY CENTER | Age: 29
End: 2019-06-05
Attending: INTERNAL MEDICINE
Payer: COMMERCIAL

## 2019-06-05 VITALS
DIASTOLIC BLOOD PRESSURE: 68 MMHG | SYSTOLIC BLOOD PRESSURE: 106 MMHG | TEMPERATURE: 97 F | RESPIRATION RATE: 20 BRPM | HEART RATE: 59 BPM | OXYGEN SATURATION: 100 %

## 2019-06-05 LAB — COLONOSCOPY: NORMAL

## 2019-06-05 RX ORDER — FLUMAZENIL 0.1 MG/ML
0.2 INJECTION, SOLUTION INTRAVENOUS
Status: ACTIVE | OUTPATIENT
Start: 2019-06-05 | End: 2019-06-05

## 2019-06-05 RX ORDER — ONDANSETRON 4 MG/1
4 TABLET, ORALLY DISINTEGRATING ORAL EVERY 6 HOURS PRN
Status: DISCONTINUED | OUTPATIENT
Start: 2019-06-05 | End: 2019-06-06 | Stop reason: HOSPADM

## 2019-06-05 RX ORDER — FENTANYL CITRATE 50 UG/ML
INJECTION, SOLUTION INTRAMUSCULAR; INTRAVENOUS PRN
Status: DISCONTINUED | OUTPATIENT
Start: 2019-06-05 | End: 2019-06-05 | Stop reason: HOSPADM

## 2019-06-05 RX ORDER — NALOXONE HYDROCHLORIDE 0.4 MG/ML
.1-.4 INJECTION, SOLUTION INTRAMUSCULAR; INTRAVENOUS; SUBCUTANEOUS
Status: DISCONTINUED | OUTPATIENT
Start: 2019-06-05 | End: 2019-06-06 | Stop reason: HOSPADM

## 2019-06-05 RX ORDER — ONDANSETRON 2 MG/ML
4 INJECTION INTRAMUSCULAR; INTRAVENOUS EVERY 6 HOURS PRN
Status: DISCONTINUED | OUTPATIENT
Start: 2019-06-05 | End: 2019-06-06 | Stop reason: HOSPADM

## 2019-06-05 RX ORDER — ONDANSETRON 2 MG/ML
4 INJECTION INTRAMUSCULAR; INTRAVENOUS
Status: DISCONTINUED | OUTPATIENT
Start: 2019-06-05 | End: 2019-06-05 | Stop reason: HOSPADM

## 2019-06-05 RX ORDER — LIDOCAINE 40 MG/G
CREAM TOPICAL
Status: DISCONTINUED | OUTPATIENT
Start: 2019-06-05 | End: 2019-06-05 | Stop reason: HOSPADM

## 2019-06-05 RX ADMIN — ONDANSETRON 4 MG: 2 INJECTION INTRAMUSCULAR; INTRAVENOUS at 09:56

## 2019-06-05 NOTE — TELEPHONE ENCOUNTER
Call placed to patient to notify him of availability of earlier procedure times. Updated schedule as noted.

## 2019-06-05 NOTE — LETTER
2019    To:   [facility]     RE: Uriah Mortensen  [address] [place] [address]  : 1990  MRN: [id number]  Policy #: [id number]      To Whom It May Concern,    I am writing on behalf of my patient, Uriah Mortensen to document the medical necessity of adalimumab q7 days for the treatment of ulcerative colitis. This letter provides information about the patient's medical history and diagnosis and a statement summarizing my treatment rationale.     Summary of Patient History and Diagnosis  Mr. 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  While he has minimal clinical symptoms he has moderate to severe colonic inflammation and has had a very high risk for colon cancer.  He had previously been treated with vedolizumab from 2016 to 2018 but stopped due to ongoing colonic inflammation.  He was then started on infliximab in 2017.  He establish care with me in August and  at which point he had developed neutralizing antibodies against infliximab.  I started him on azathioprine in 2018, Lialda 4.8 g a day, and transition him to adalimumab starting in 2018.  On follow-up colonoscopy on azathioprine and adalimumab he continues to have moderate to severe right-sided colonic inflammation.  His adalimumab concentration on every other week dosing was 7.  And a fecal calprotectin was 590.    Treatment Rationale  Due to the coexistence of moderate to severe ulcerative colitis in the setting of primary sclerosing cholangitis, he is at high risk for development of colon cancer particularly with his active colonic inflammation.  He would clearly benefit from treating to endoscopic improvement, and ideally histologic healing.  Given his ongoing inflammation on every other week adalimumab, with adalimumab concentration under 10, I would like to increase his adalimumab to  every week.  A prior study by myself and colleagues identified adalimumab concentrations of in the range of 12 - 16 as being associated with mucosal healing (Sobeida et al).  Therefore I would like to increase his adalimumab dosing with the intent to achieve mucosal healing in this patient who is a high risk for the development of colon cancer.  If on high-dose adalimumab, there is no change in his colonic inflammation, we will transition off adalimumab to a different medication.      Duration  12 months.       Summary  In summary, Adalimumab q7 days is medically necessary for this patient s medical condition. Please call my office at [phone]  if I can provide you with any additional information to approve my request. I look forward to receiving your timely response and approval of this request.     Sincerely,    Jasen Watkins MD    [facility]  Director, Inflammatory Bowel Disease Program  Division of Gastroenterology, Hepatology and Nutrition      Citations:   Sobeida LOCO, Erwin K, Roland CHANDLER, Sharon AS. Maintenance Adalimumab Concentrations Are Associated with Biochemical, Endoscopic, and Histologic Remission in Inflammatory Bowel Disease. Dig Dis Sci. 2018 Nov;63(11):9226-8029. doi: 10.1007/[id number]. Epub 2018 Jul 13.

## 2019-06-05 NOTE — TELEPHONE ENCOUNTER
"Medication Appeal Initiation    We have initiated an appeal for the requested medication:  Medication: Humira - weekly dosing - Appeal initiated  Appeal Start Date:  6/5/2019  Insurance Company:  [employer]   Comments:   Letter in \"letters\" tab      "

## 2019-06-06 DIAGNOSIS — K83.01 PSC (PRIMARY SCLEROSING CHOLANGITIS) (H): ICD-10-CM

## 2019-06-06 LAB
ALBUMIN SERPL-MCNC: 3.5 G/DL (ref 3.4–5)
ALP SERPL-CCNC: 101 U/L (ref 40–150)
ALT SERPL W P-5'-P-CCNC: 27 U/L (ref 0–70)
ANION GAP SERPL CALCULATED.3IONS-SCNC: 5 MMOL/L (ref 3–14)
AST SERPL W P-5'-P-CCNC: 21 U/L (ref 0–45)
BILIRUB DIRECT SERPL-MCNC: 0.2 MG/DL (ref 0–0.2)
BILIRUB SERPL-MCNC: 0.7 MG/DL (ref 0.2–1.3)
BUN SERPL-MCNC: 9 MG/DL (ref 7–30)
CALCIUM SERPL-MCNC: 8.8 MG/DL (ref 8.5–10.1)
CHLORIDE SERPL-SCNC: 105 MMOL/L (ref 94–109)
CO2 SERPL-SCNC: 28 MMOL/L (ref 20–32)
COPATH REPORT: NORMAL
CREAT SERPL-MCNC: 1.03 MG/DL (ref 0.66–1.25)
ERYTHROCYTE [DISTWIDTH] IN BLOOD BY AUTOMATED COUNT: 13.9 % (ref 10–15)
GFR SERPL CREATININE-BSD FRML MDRD: >90 ML/MIN/{1.73_M2}
GLUCOSE SERPL-MCNC: 108 MG/DL (ref 70–99)
HCT VFR BLD AUTO: 41.9 % (ref 40–53)
HGB BLD-MCNC: 13.8 G/DL (ref 13.3–17.7)
INR PPP: 1.15 (ref 0.86–1.14)
MCH RBC QN AUTO: 29.9 PG (ref 26.5–33)
MCHC RBC AUTO-ENTMCNC: 32.9 G/DL (ref 31.5–36.5)
MCV RBC AUTO: 91 FL (ref 78–100)
PLATELET # BLD AUTO: 237 10E9/L (ref 150–450)
POTASSIUM SERPL-SCNC: 4 MMOL/L (ref 3.4–5.3)
PROT SERPL-MCNC: 7.1 G/DL (ref 6.8–8.8)
RBC # BLD AUTO: 4.62 10E12/L (ref 4.4–5.9)
SODIUM SERPL-SCNC: 138 MMOL/L (ref 133–144)
WBC # BLD AUTO: 4.9 10E9/L (ref 4–11)

## 2019-06-06 NOTE — TELEPHONE ENCOUNTER
MEDICATION APPEAL APPROVED    Medication: Humira - weekly dosing - Appeal Approved  Authorization Effective Date:  05/05/2019  Authorization Expiration Date:  06/05/2020  Approved Dose/Quantity:  4 PENS PER 28 DAYS  Reference #: [id number]   Insurance Company: [employer] - Phone [phone] Fax [phone]  Expected CoPay:     $0.00  CoPay Card Available:      Foundation Assistance Needed:    Which Pharmacy is filling the prescription (Not needed for infusion/clinic administered): [facility] - [place], [place] - [address]

## 2019-06-11 ENCOUNTER — OFFICE VISIT (OUTPATIENT)
Dept: GASTROENTEROLOGY | Facility: CLINIC | Age: 29
End: 2019-06-11
Payer: COMMERCIAL

## 2019-06-11 ENCOUNTER — OFFICE VISIT (OUTPATIENT)
Dept: GASTROENTEROLOGY | Facility: CLINIC | Age: 29
End: 2019-06-11
Attending: INTERNAL MEDICINE
Payer: COMMERCIAL

## 2019-06-11 VITALS
HEIGHT: 76 IN | HEART RATE: 69 BPM | BODY MASS INDEX: 24.76 KG/M2 | OXYGEN SATURATION: 97 % | WEIGHT: 203.3 LBS | SYSTOLIC BLOOD PRESSURE: 120 MMHG | DIASTOLIC BLOOD PRESSURE: 78 MMHG

## 2019-06-11 VITALS
BODY MASS INDEX: 24.45 KG/M2 | SYSTOLIC BLOOD PRESSURE: 117 MMHG | HEART RATE: 71 BPM | RESPIRATION RATE: 16 BRPM | DIASTOLIC BLOOD PRESSURE: 75 MMHG | WEIGHT: 200.8 LBS | HEIGHT: 76 IN | TEMPERATURE: 97.4 F

## 2019-06-11 DIAGNOSIS — K51.00 ULCERATIVE PANCOLITIS WITHOUT COMPLICATION (H): ICD-10-CM

## 2019-06-11 DIAGNOSIS — K83.01 PSC (PRIMARY SCLEROSING CHOLANGITIS) (H): Primary | ICD-10-CM

## 2019-06-11 PROCEDURE — G0463 HOSPITAL OUTPT CLINIC VISIT: HCPCS | Mod: ZF

## 2019-06-11 RX ORDER — MESALAMINE 1.2 G/1
4.8 TABLET, DELAYED RELEASE ORAL DAILY
Qty: 180 TABLET | Refills: 3 | Status: SHIPPED | OUTPATIENT
Start: 2019-06-11 | End: 2020-04-29

## 2019-06-11 ASSESSMENT — MIFFLIN-ST. JEOR
SCORE: 1998.66
SCORE: 1987.32

## 2019-06-11 ASSESSMENT — PAIN SCALES - GENERAL
PAINLEVEL: NO PAIN (0)
PAINLEVEL: NO PAIN (0)

## 2019-06-11 NOTE — LETTER
June 11, 2019       TO: Uriah Mortensen  [address] [place] [address]         une 11, 2019    Regarding:  Uriah Mortensen  [address] [place] [address]              To Whom It May Concern;    Mr. Mortensen has a medical condition called ulcerative colitis which requires him to use a medication called Humira.  This is a pen-injector and he should be allowed to travel with this on the airplane without any restrictions.  This is a medical necessity.  If you have any questions please feel free to call my office at [phone]         Sincerely,      Jasen Watkins MD    [facility]  Inflammatory Bowel Disease Program  Division of Gastroenterology, Hepatology and Nutrition

## 2019-06-11 NOTE — NURSING NOTE
"Chief Complaint   Patient presents with     RECHECK     PSC       Vital signs:  Temp: 97.4  F (36.3  C) Temp src: Oral BP: 117/75 Pulse: 71   Resp: 16       Height: 193 cm (6' 4\") Weight: 91.1 kg (200 lb 12.8 oz)  Estimated body mass index is 24.44 kg/m  as calculated from the following:    Height as of this encounter: 1.93 m (6' 4\").    Weight as of this encounter: 91.1 kg (200 lb 12.8 oz).          Anita Rosales [facility]  6/11/2019 9:43 AM      "

## 2019-06-11 NOTE — PROGRESS NOTES
IBD CLINIC VISIT     CC/REFERRING MD:  Elsie Ridley  REASON FOR CONSULTATION: Ulcerative colitis-PSC    ASSESSMENT/PLAN  29 year old man with PSC colitis on Azathioprine and Lialda who presents for follow-up.    1. Colitis: Likely PSC colitis- Currently, remains in clinical remission, but has endoscopic evidence of active - severe right sided disease.  Given his higher risk for colorectal cancer, plan to escalate adalimumab to weekly. If no benefit after a year of weekly adalimumab, will plan to stop and trial tofacitinib.  Other option would be oral vancomycin with has some experimental data for treating PSC-colitis.   - Increase adalimumab to weekly  - Continue azathioprine 175mg daily  - Continue lialda 4.8g daily   - Continue multivitamin, including calcium and vit D    2. PSC: Followed by Dr. Herrera of the [facility].   - Continue PSC care by Dr. Herrera through [facility]      3. Celiac disease: Per patient diagnosed at [place], but path unavailable. Celiac serologies normal in [facility] records.  Duodenal biopsies from 8/14/17 normal (presumably on gluten free diet). Will have cramping and diarrhea if he accidentally eats gluten.  - Continue gluten free diet for now.     4. Eosinophilic esophagitis: Unclear if he has EoE or reactive changes related to reflux.  Denies any dysphagia.  Had > 15 eosinophils/hpf on distal esophageal biopsies 12/17/18, but biopsies from mid-esophagus were normal.    - Continue PPI therapy    IBD HISTORY  Age at diagnosis: 23  Extent of disease: Pancolitis   Current UC medications:   - Adalimumab 40mg q14 days (started 12/2018)  - Azathioprine 175 mg daily: Started 2018  - Lialda 4.8g daily   Prior IBD surgeries: None  Prior IBD Medications:  - Vedolizumab (8/2016 - 8/2017) stopped due to ongoing inflammation on colonoscopy (not clear if drug/Ab levels were checked)  - Infliximab-developed antibodies (8/2018)  - Budesonide  - Asacol     DRUG MONITORING  TPMT enzyme activity: 29.4  (8/20/18)    6-TGN/6-MMPN levels: NA    Biologic concentration:  Infliximab 7/25/18-drug level 0, antibody greater than 200    DISEASE ASSESSMENT  Labs:  Recent Labs   Lab Test 12/18/18  0716 08/20/18  1615   CRP <2.9 <2.9   SED  --  8     Endoscopic assessment: eMayo 3 6/5/19 (unchanged from before)  Enterography: NA  Fecal calprotectin: NA  C diff: NA    IBD Health Care Maintenance:    Vaccinations:  All patients on biologics should avoid live vaccines.    -- Influenza (every year)  -- TdaP (every 10 years)  -- Pneumococcal Pneumonia (once plus booster at 5 years)  -- Prevnar 13  -- Yearly assessment for latent Tb (verbal screening and exam, PPD or QuantiFERON-Tb testing)    One time confirmation of immunity or serologies:  -- Hepatitis A (serologies or immunizations)  -- Hepatitis B (serologies or immunizations)  -- Varicella  -- MMR  -- HPV (all aged 18-26)  -- Meningococcal meningitis (all patients at risk for meningitis)    Bone mineral density screening   -- Recommend all patients supplement with calcium and vitamin D    Cancer Screening:  Colon cancer screening:  Given PSC colitis, recommend patient undergo regular yearly dysplasia surveillance   Next dysplasia screening is recommended December 2018.    Skin cancer screening: Annual visual exam of skin by dermatologist since patient is immunocompromised    Misc:  -- Avoid tobacco use  -- Avoid NSAIDs as there is potentially a 25% chance of causing an IBD flare    Return to clinic in 5 months as previously scheduled    Thank you for this consultation.  It was a pleasure to participate in the care of this patient; please contact us with any further questions.     Jasen Watkins MD    [facility]  Inflammatory Bowel Disease Program  Division of Gastroenterology, Hepatology and Nutrition       HPI:   Having 2-3 stools a day that are formed.  No recent issues with diarrhea.  No blood in stools.  No ab pain, nausea or vomiting.       Remains on gluten free diet.  No other diet or activity restrictions.     Woody score:   Stool freq: 0 (baseline stools frequency)  Rectal bleedin (None)  PGA: 0 (normal)  Endoscopy: 3 (Severe)    ROS:    No fevers or chills  No weight loss  No blurry vision, double vision or change in vision  No sore throat  No lymphadenopathy  No headache, paraesthesias, or weakness in a limb  No shortness of breath or wheezing  No chest pain or pressure  No arthralgias or myalgias  No rashes or skin changes  No odynophagia or dysphagia  No BRBPR, hematochezia, melena  No dysuria, frequency or urgency  No hot/cold intolerance  No anxiety or depression    Extra intestinal manifestations of IBD:  No uveitis/episcleritis  No aphthous ulcers   No arthritis   No erythema nodosum/pyoderma gangrenosum.     PERTINENT PAST MEDICAL HISTORY:  Past Medical History:   Diagnosis Date     Celiac disease      PSC (primary sclerosing cholangitis)      Ulcerative colitis ([facility])      PREVIOUS SURGERIES:  Past Surgical History:   Procedure Laterality Date     COLONOSCOPY N/A 2019    Procedure: COLONOSCOPY, WITH BIOPSY;  Surgeon: Jasen Watkins MD;  [facility]       PREVIOUS ENDOSCOPY:    19: Moderate to severe (eMayo 3) right sided colitis - no dysplasia.     18: Moderate to severe right sided colitis - no dysplasia    Colonoscopy 18-granularity and loss of vascularity found in a continuous of circumferential pattern from the transverse colon to the hepatic flexure-mild.  Altered vascularity, erythema, granularity, mucus and shallow ulcerations from rectum to splenic flexure?.  TI appeared normal    Pathology 17  Final Pathologic Diagnosis   A. Terminal ileum, biopsy -- Small bowel mucosa with no significant   pathologic abnormality.   B. Colon, cecum and ascending, biopsies --       1.   Focal mildly active chronic colitis and pseudopolyp.         2.  No dysplasia seen.   C. Colon, transverse, biopsies --        1.  Marked architectural disorder and pseudopolyp with minimal   active colitis seen.       2.  No dysplasia seen.    D. Colon, descending, biopsy --       1.  Chronic colitis with focal mild activity.       2.  No dysplasia seen.   E. Colon, sigmoid, biopsy --       1.  Chronic colitis with mild activity.       2.  No dysplasia seen.   F. Colon, rectum, biopsy --       1.  Chronic colitis with minimal activity.       2.  No dysplasia seen.   G. Duodenum, biopsy --  Small bowel mucosa with no significant   pathologic abnormality.              H. Stomach, body and antrum, biopsy --        1.  Changes consistent with reactive gastropathy       2.  Negative for H. pylori   I. Esophagus, lower, biopsy --       1.   Squamous mucosa with nonspecific reactive changes.        2.   No eosinophilia or goblet cell metaplasia is seen.   J. Esophagus, middle, biopsy -- squamous mucosa with no significant   pathologic abnormality.     ALLERGIES:   Allergies   Allergen Reactions     Fish-Derived Products Hives     Positive skin test to fish and shellfish on 9/14/2016.     Shellfish Allergy Hives, Itching and Shortness Of Breath     Gluten Meal      PERTINENT MEDICATIONS:    Current Outpatient Medications:      adalimumab (HUMIRA *CF* PEN-CD/UC/HS STARTER) 80 MG/0.8ML pen kit, Inject 2 pens subcutaneously  (160mg) then 2 weeks late inject 1 pen (80mg), Disp: 3 each, Rfl: 0     adalimumab (HUMIRA *CF*) 40 MG/0.4ML pen kit, Inject 0.4 mLs (40 mg) Subcutaneous every 7 days, Disp: 4 each, Rfl: 5     adalimumab (HUMIRA *CF*) 40 MG/0.4ML pen kit, Inject 0.4 mLs (40 mg) Subcutaneous every 14 days, Disp: 2 each, Rfl: 5     azaTHIOprine (IMURAN) 50 MG tablet, Take 3.5 tablets (175 mg) by mouth daily, Disp: 105 tablet, Rfl: 5     EPINEPHrine (EPIPEN 2-NAGI) 0.3 MG/0.3ML injection 2-pack, Inject when needed for emergency treatment of severe allergic reactions (anaphylaxis)., Disp: , Rfl:      mesalamine (LIALDA) 1.2 g EC tablet, Take 4  "tablets (4.8 g) by mouth daily, Disp: 180 tablet, Rfl: 3    SOCIAL HISTORY:  Social History     Socioeconomic History     Marital status: Single     Spouse name: Not on file     Number of children: Not on file     Years of education: Not on file     Highest education level: Not on file   Occupational History     Not on file   Social Needs     Financial resource strain: Not on file     Food insecurity:     Worry: Not on file     Inability: Not on file     Transportation needs:     Medical: Not on file     Non-medical: Not on file   Tobacco Use     Smoking status: Never Smoker     Smokeless tobacco: Never Used   Substance and Sexual Activity     Alcohol use: No     Alcohol/week: 0.0 oz     Comment: None     Drug use: No     Sexual activity: Not on file   Lifestyle     Physical activity:     Days per week: Not on file     Minutes per session: Not on file     Stress: Not on file   Relationships     Social connections:     Talks on phone: Not on file     Gets together: Not on file     Attends Church service: Not on file     Active member of club or organization: Not on file     Attends meetings of clubs or organizations: Not on file     Relationship status: Not on file     Intimate partner violence:     Fear of current or ex partner: Not on file     Emotionally abused: Not on file     Physically abused: Not on file     Forced sexual activity: Not on file   Other Topics Concern     Not on file   Social History Narrative     Not on file     FAMILY HISTORY:  Family History   Problem Relation Age of Onset     Hypertension Father      Lupus Paternal Grandmother      Colon Cancer Paternal Grandfather      PHYSICAL EXAMINATION:  Constitutional: aaox3, cooperative, pleasant, not dyspneic/diaphoretic, no acute distress  Vitals reviewed: /78   Pulse 69   Ht 1.93 m (6' 4\")   Wt 92.2 kg (203 lb 4.8 oz)   SpO2 97%   BMI 24.75 kg/m    Wt:   Wt Readings from Last 2 Encounters:   06/11/19 92.2 kg (203 lb 4.8 oz)   12/18/18 " 90.4 kg (199 lb 3.2 oz)      Eyes: Sclera anicteric/injected  Ears/nose/mouth/throat: Normal oropharynx without ulcers or exudate, mucus membranes moist, hearing intact  Neck: supple, thyroid normal size  CV: No edema  Respiratory: Unlabored breathing  Lymph: No cervical lymphadenopathy  Abd: Nondistended, +bs, no hepatosplenomegaly, nontender, no peritoneal signs  Skin: warm, perfused, no jaundice  Psych: Normal affect  MSK: Normal gait    PERTINENT STUDIES:  Most recent CBC:  Recent Labs   Lab Test 06/06/19  0853 12/18/18  0716   WBC 4.9 4.9   HGB 13.8 14.0   HCT 41.9 42.5    234     Most recent hepatic panel:  Recent Labs   Lab Test 06/06/19  0853 12/18/18  0716   ALT 27 48   AST 21 37     Most recent creatinine:  Recent Labs   Lab Test 06/06/19  0853 12/18/18  0716   CR 1.03 1.10           Answers for HPI/ROS submitted by the patient on 6/9/2019   General Symptoms: No  Skin Symptoms: No  HENT Symptoms: No  EYE SYMPTOMS: No  HEART SYMPTOMS: No  LUNG SYMPTOMS: No  INTESTINAL SYMPTOMS: No  URINARY SYMPTOMS: No  REPRODUCTIVE SYMPTOMS: No  SKELETAL SYMPTOMS: No  BLOOD SYMPTOMS: No  NERVOUS SYSTEM SYMPTOMS: No  MENTAL HEALTH SYMPTOMS: No

## 2019-06-11 NOTE — LETTER
6/11/2019      RE: Uriah Mortensen  [address] [place] [address]       A/P  29 y M with PSC and UC. Chronic elevations in liver enzymes with biopsy 2017 showing just PSC. Labs are much improved with normal values now.      Will get MRI report done in last 6 mo at [place].  Continue lab monitoring every 6 months (more often as per Dr. Watkins).     RTC in 1 year     This was a 25 minute visit, over 50% counseling and coordination of care.   Graciela Herrera MD  Hepatology/Liver Transplant  Medical Director, Liver Transplantation  [facility]  ===================================================================  S: 29 y M with PSC diagnosed in 2014. Also has UC pancolitis and celiac. Saw Dr. Watkins this morning. He is here with his dad today. Dr. Watkins states he is in clinical remission but has endoscopic evidence of active disease (severe R sided). Plan for weekly adalimumab.     He feels well. No fever, no abdominal pain, no diarrhea, no blood in stool, good appetite. no weight loss.      PSC  -dx at [place] through MRI  -Never required ERCP  -No episodes of cholangitis  -Alk phos up to 800s, was around 300 chronically, now 101  -Normal platelet count, normal kidney function.    -Liver biopsy 9/2017 showed PSC, no inflammation, read by Dr. Marte. LFTS at that time were   AST 99  -MRI done 8/8/17 in [place] showed findings c/w PSC and no mass.      UC  -On vedolizumab 8/16-8/2017 and it was stopped 2/2 ongoing inflammation on colonoscopy  -Dr. Watkins saw him this am. He is in clinical remission. 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     /75 (BP Location: Right arm, Patient Position: Sitting, Cuff Size: Adult Regular)   Pulse 71   Temp 97.4  F (36.3  C) (Oral)   Resp 16   Ht 1.93 m (6' 4\")   Wt 91.1 kg (200 lb 12.8 oz)   BMI 24.44 kg/m       GENERAL:  Pleasant, in no acute distress.   HEENT:  No icterus, no oral lesions.   LYMPH:  No supraclavicular or cervical lymphadenopathy.   CARDIOVASCULAR:  Regular rate and rhythm.   CHEST:  Lungs are clear.   ABDOMEN:  Bowel sounds are present, soft, nontender, nondistended, no hepatosplenomegaly.   EXTREMITIES:  No edema.   SKIN:  No rash.   NEUROLOGIC:  Speech is fluent and clear.  No asterixis or tremor.              Graciela Herrera MD      "

## 2019-06-11 NOTE — NURSING NOTE
"Chief Complaint   Patient presents with     RECHECK     Return IBD, post colonoscopy follow up dos 6/5/19       Vitals:    06/11/19 0827   BP: 120/78   Pulse: 69   SpO2: 97%   Weight: 92.2 kg (203 lb 4.8 oz)   Height: 1.93 m (6' 4\")       Body mass index is 24.75 kg/m .    Chula Dorantes CMA    "

## 2019-06-11 NOTE — PROGRESS NOTES
A/P  29 y M with PSC and UC. Chronic elevations in liver enzymes with biopsy 2017 showing just PSC. Labs are much improved with normal values now.      Will get MRI report done in last 6 mo at [place].  Continue lab monitoring every 6 months (more often as per Dr. Watkins).     RTC in 1 year     This was a 25 minute visit, over 50% counseling and coordination of care.   Graciela Herrera MD  Hepatology/Liver Transplant  Medical Director, Liver Transplantation  [facility]  ===================================================================  S: 29 y M with PSC diagnosed in 2014. Also has UC pancolitis and celiac. Saw Dr. Watkins this morning. He is here with his dad today. Dr. Watkins states he is in clinical remission but has endoscopic evidence of active disease (severe R sided). Plan for weekly adalimumab.     He feels well. No fever, no abdominal pain, no diarrhea, no blood in stool, good appetite. no weight loss.      PSC  -dx at [place] through MRI  -Never required ERCP  -No episodes of cholangitis  -Alk phos up to 800s, was around 300 chronically, now 101  -Normal platelet count, normal kidney function.    -Liver biopsy 9/2017 showed PSC, no inflammation, read by Dr. Marte. LFTS at that time were   AST 99  -MRI done 8/8/17 in [place] showed findings c/w PSC and no mass.      UC  -On vedolizumab 8/16-8/2017 and it was stopped 2/2 ongoing inflammation on colonoscopy  -Dr. Watkins saw him this am. He is in clinical remission. 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     /75 (BP Location: Right arm, Patient Position: Sitting, Cuff Size:  "Adult Regular)   Pulse 71   Temp 97.4  F (36.3  C) (Oral)   Resp 16   Ht 1.93 m (6' 4\")   Wt 91.1 kg (200 lb 12.8 oz)   BMI 24.44 kg/m      GENERAL:  Pleasant, in no acute distress.   HEENT:  No icterus, no oral lesions.   LYMPH:  No supraclavicular or cervical lymphadenopathy.   CARDIOVASCULAR:  Regular rate and rhythm.   CHEST:  Lungs are clear.   ABDOMEN:  Bowel sounds are present, soft, nontender, nondistended, no hepatosplenomegaly.   EXTREMITIES:  No edema.   SKIN:  No rash.   NEUROLOGIC:  Speech is fluent and clear.  No asterixis or tremor.            "

## 2019-06-11 NOTE — LETTER
[facility]STRO[facility]  [address] [id number] [phone]        June 11, 2019    Regarding:  Uriah Mortensen  [address] [place] [address]              To Whom It May Concern;    Mr. Mortensen has a medical condition called ulcerative colitis which requires him to use a medication called Humira.  This is a pen-injector and he should be allowed to travel with this on the airplane without any restrictions.  This is a medical necessity.  If you have any questions please feel free to call my office at [phone]         Sincerely,      Jasen Watkins MD    [facility]  Inflammatory Bowel Disease Program  Division of Gastroenterology, Hepatology and Nutrition

## 2019-06-11 NOTE — LETTER
6/11/2019       RE: Uriah Mortensen  [address] [place] [address]     Dear Colleague,    Thank you for referring your patient, Uriah Mortensen, to the [facility] GASTROENTEROLOGY AND IBD CLINIC at [facility]. Please see a copy of my visit note below.    IBD CLINIC VISIT     CC/REFERRING MD:  Elsie Ridley  REASON FOR CONSULTATION: Ulcerative colitis-PSC    ASSESSMENT/PLAN  29 year old man with PSC colitis on Azathioprine and Lialda who presents for follow-up.    1. Colitis: Likely PSC colitis- Currently, remains in clinical remission, but has endoscopic evidence of active - severe right sided disease.  Given his higher risk for colorectal cancer, plan to escalate adalimumab to weekly. If no benefit after a year of weekly adalimumab, will plan to stop and trial tofacitinib.  Other option would be oral vancomycin with has some experimental data for treating PSC-colitis.   - Increase adalimumab to weekly  - Continue azathioprine 175mg daily  - Continue lialda 4.8g daily   - Continue multivitamin, including calcium and vit D    2. PSC: Followed by Dr. Herrera of the [facility].   - Continue PSC care by Dr. Herrera through [facility]      3. Celiac disease: Per patient diagnosed at [place], but path unavailable. Celiac serologies normal in [facility] records.  Duodenal biopsies from 8/14/17 normal (presumably on gluten free diet). Will have cramping and diarrhea if he accidentally eats gluten.  - Continue gluten free diet for now.     4. Eosinophilic esophagitis: Unclear if he has EoE or reactive changes related to reflux.  Denies any dysphagia.  Had > 15 eosinophils/hpf on distal esophageal biopsies 12/17/18, but biopsies from mid-esophagus were normal.    - Continue PPI therapy    IBD HISTORY  Age at diagnosis: 23  Extent of disease: Pancolitis   Current UC medications:   - Adalimumab 40mg q14 days (started 12/2018)  - Azathioprine 175 mg daily: Started 2018  - Lialda  4.8g daily   Prior IBD surgeries: None  Prior IBD Medications:  - Vedolizumab (8/2016 - 8/2017) stopped due to ongoing inflammation on colonoscopy (not clear if drug/Ab levels were checked)  - Infliximab-developed antibodies (8/2018)  - Budesonide  - Asacol     DRUG MONITORING  TPMT enzyme activity: 29.4 (8/20/18)    6-TGN/6-MMPN levels: NA    Biologic concentration:  Infliximab 7/25/18-drug level 0, antibody greater than 200    DISEASE ASSESSMENT  Labs:  Recent Labs   Lab Test 12/18/18  0716 08/20/18  1615   CRP <2.9 <2.9   SED  --  8     Endoscopic assessment: eMayo 3 6/5/19 (unchanged from before)  Enterography: NA  Fecal calprotectin: NA  C diff: NA    IBD Health Care Maintenance:    Vaccinations:  All patients on biologics should avoid live vaccines.    -- Influenza (every year)  -- TdaP (every 10 years)  -- Pneumococcal Pneumonia (once plus booster at 5 years)  -- Prevnar 13  -- Yearly assessment for latent Tb (verbal screening and exam, PPD or QuantiFERON-Tb testing)    One time confirmation of immunity or serologies:  -- Hepatitis A (serologies or immunizations)  -- Hepatitis B (serologies or immunizations)  -- Varicella  -- MMR  -- HPV (all aged 18-26)  -- Meningococcal meningitis (all patients at risk for meningitis)    Bone mineral density screening   -- Recommend all patients supplement with calcium and vitamin D    Cancer Screening:  Colon cancer screening:  Given PSC colitis, recommend patient undergo regular yearly dysplasia surveillance   Next dysplasia screening is recommended December 2018.    Skin cancer screening: Annual visual exam of skin by dermatologist since patient is immunocompromised    Misc:  -- Avoid tobacco use  -- Avoid NSAIDs as there is potentially a 25% chance of causing an IBD flare    Return to clinic in 5 months as previously scheduled    Thank you for this consultation.  It was a pleasure to participate in the care of this patient; please contact us with any further questions.      Jasen Watkins MD    [facility]  Inflammatory Bowel Disease Program  Division of Gastroenterology, Hepatology and Nutrition       HPI:   Having 2-3 stools a day that are formed.  No recent issues with diarrhea.  No blood in stools.  No ab pain, nausea or vomiting.      Remains on gluten free diet.  No other diet or activity restrictions.     Woody score:   Stool freq: 0 (baseline stools frequency)  Rectal bleedin (None)  PGA: 0 (normal)  Endoscopy: 3 (Severe)    ROS:    No fevers or chills  No weight loss  No blurry vision, double vision or change in vision  No sore throat  No lymphadenopathy  No headache, paraesthesias, or weakness in a limb  No shortness of breath or wheezing  No chest pain or pressure  No arthralgias or myalgias  No rashes or skin changes  No odynophagia or dysphagia  No BRBPR, hematochezia, melena  No dysuria, frequency or urgency  No hot/cold intolerance  No anxiety or depression    Extra intestinal manifestations of IBD:  No uveitis/episcleritis  No aphthous ulcers   No arthritis   No erythema nodosum/pyoderma gangrenosum.     PERTINENT PAST MEDICAL HISTORY:  Past Medical History:   Diagnosis Date     Celiac disease      PSC (primary sclerosing cholangitis)      Ulcerative colitis (H)      PREVIOUS SURGERIES:  Past Surgical History:   Procedure Laterality Date     COLONOSCOPY N/A 2019    Procedure: COLONOSCOPY, WITH BIOPSY;  Surgeon: Jasen Watkins MD;  [facility]       PREVIOUS ENDOSCOPY:    19: Moderate to severe (eMayo 3) right sided colitis - no dysplasia.     18: Moderate to severe right sided colitis - no dysplasia    Colonoscopy 18-granularity and loss of vascularity found in a continuous of circumferential pattern from the transverse colon to the hepatic flexure-mild.  Altered vascularity, erythema, granularity, mucus and shallow ulcerations from rectum to splenic flexure?.  TI appeared normal    Pathology  8/14/17  Final Pathologic Diagnosis   A. Terminal ileum, biopsy -- Small bowel mucosa with no significant   pathologic abnormality.   B. Colon, cecum and ascending, biopsies --       1.   Focal mildly active chronic colitis and pseudopolyp.         2.  No dysplasia seen.   C. Colon, transverse, biopsies --       1.  Marked architectural disorder and pseudopolyp with minimal   active colitis seen.       2.  No dysplasia seen.    D. Colon, descending, biopsy --       1.  Chronic colitis with focal mild activity.       2.  No dysplasia seen.   E. Colon, sigmoid, biopsy --       1.  Chronic colitis with mild activity.       2.  No dysplasia seen.   F. Colon, rectum, biopsy --       1.  Chronic colitis with minimal activity.       2.  No dysplasia seen.   G. Duodenum, biopsy --  Small bowel mucosa with no significant   pathologic abnormality.              H. Stomach, body and antrum, biopsy --        1.  Changes consistent with reactive gastropathy       2.  Negative for H. pylori   I. Esophagus, lower, biopsy --       1.   Squamous mucosa with nonspecific reactive changes.        2.   No eosinophilia or goblet cell metaplasia is seen.   J. Esophagus, middle, biopsy -- squamous mucosa with no significant   pathologic abnormality.     ALLERGIES:   Allergies   Allergen Reactions     Fish-Derived Products Hives     Positive skin test to fish and shellfish on 9/14/2016.     Shellfish Allergy Hives, Itching and Shortness Of Breath     Gluten Meal      PERTINENT MEDICATIONS:    Current Outpatient Medications:      adalimumab (HUMIRA *CF* PEN-CD/UC/HS STARTER) 80 MG/0.8ML pen kit, Inject 2 pens subcutaneously  (160mg) then 2 weeks late inject 1 pen (80mg), Disp: 3 each, Rfl: 0     adalimumab (HUMIRA *CF*) 40 MG/0.4ML pen kit, Inject 0.4 mLs (40 mg) Subcutaneous every 7 days, Disp: 4 each, Rfl: 5     adalimumab (HUMIRA *CF*) 40 MG/0.4ML pen kit, Inject 0.4 mLs (40 mg) Subcutaneous every 14 days, Disp: 2 each, Rfl: 5      azaTHIOprine (IMURAN) 50 MG tablet, Take 3.5 tablets (175 mg) by mouth daily, Disp: 105 tablet, Rfl: 5     EPINEPHrine (EPIPEN 2-NAGI) 0.3 MG/0.3ML injection 2-pack, Inject when needed for emergency treatment of severe allergic reactions (anaphylaxis)., Disp: , Rfl:      mesalamine (LIALDA) 1.2 g EC tablet, Take 4 tablets (4.8 g) by mouth daily, Disp: 180 tablet, Rfl: 3    SOCIAL HISTORY:  Social History     Socioeconomic History     Marital status: Single     Spouse name: Not on file     Number of children: Not on file     Years of education: Not on file     Highest education level: Not on file   Occupational History     Not on file   Social Needs     Financial resource strain: Not on file     Food insecurity:     Worry: Not on file     Inability: Not on file     Transportation needs:     Medical: Not on file     Non-medical: Not on file   Tobacco Use     Smoking status: Never Smoker     Smokeless tobacco: Never Used   Substance and Sexual Activity     Alcohol use: No     Alcohol/week: 0.0 oz     Comment: None     Drug use: No     Sexual activity: Not on file   Lifestyle     Physical activity:     Days per week: Not on file     Minutes per session: Not on file     Stress: Not on file   Relationships     Social connections:     Talks on phone: Not on file     Gets together: Not on file     Attends Yazdanism service: Not on file     Active member of club or organization: Not on file     Attends meetings of clubs or organizations: Not on file     Relationship status: Not on file     Intimate partner violence:     Fear of current or ex partner: Not on file     Emotionally abused: Not on file     Physically abused: Not on file     Forced sexual activity: Not on file   Other Topics Concern     Not on file   Social History Narrative     Not on file     FAMILY HISTORY:  Family History   Problem Relation Age of Onset     Hypertension Father      Lupus Paternal Grandmother      Colon Cancer Paternal Grandfather      PHYSICAL  "EXAMINATION:  Constitutional: aaox3, cooperative, pleasant, not dyspneic/diaphoretic, no acute distress  Vitals reviewed: /78   Pulse 69   Ht 1.93 m (6' 4\")   Wt 92.2 kg (203 lb 4.8 oz)   SpO2 97%   BMI 24.75 kg/m     Wt:   Wt Readings from Last 2 Encounters:   06/11/19 92.2 kg (203 lb 4.8 oz)   12/18/18 90.4 kg (199 lb 3.2 oz)      Eyes: Sclera anicteric/injected  Ears/nose/mouth/throat: Normal oropharynx without ulcers or exudate, mucus membranes moist, hearing intact  Neck: supple, thyroid normal size  CV: No edema  Respiratory: Unlabored breathing  Lymph: No cervical lymphadenopathy  Abd: Nondistended, +bs, no hepatosplenomegaly, nontender, no peritoneal signs  Skin: warm, perfused, no jaundice  Psych: Normal affect  MSK: Normal gait    PERTINENT STUDIES:  Most recent CBC:  Recent Labs   Lab Test 06/06/19  0853 12/18/18  0716   WBC 4.9 4.9   HGB 13.8 14.0   HCT 41.9 42.5    234     Most recent hepatic panel:  Recent Labs   Lab Test 06/06/19  0853 12/18/18  0716   ALT 27 48   AST 21 37     Most recent creatinine:  Recent Labs   Lab Test 06/06/19  0853 12/18/18  0716   CR 1.03 1.10     Again, thank you for allowing me to participate in the care of your patient.      Sincerely,    Jasen Watkins MD      "

## 2019-06-11 NOTE — LETTER
6/11/2019       RE: Uriah Mortensen  [address] [place] [address]     Dear Colleague,    Thank you for referring your patient, Uriah Mortensen, to the [facility] HEPATOLOGY at [facility]. Please see a copy of my visit note below.    A/P  29 y M with PSC and UC. Chronic elevations in liver enzymes with biopsy 2017 showing just PSC. Labs are much improved with normal values now.      Will get MRI report done in last 6 mo at [place].  Continue lab monitoring every 6 months (more often as per Dr. Watkins).     RTC in 1 year     This was a 25 minute visit, over 50% counseling and coordination of care.   Graciela Herrera MD  Hepatology/Liver Transplant  Medical Director, Liver Transplantation  [facility]  ===================================================================  S: 29 y M with PSC diagnosed in 2014. Also has UC pancolitis and celiac. Saw Dr. Watkins this morning. He is here with his dad today. Dr. Watkins states he is in clinical remission but has endoscopic evidence of active disease (severe R sided). Plan for weekly adalimumab.     He feels well. No fever, no abdominal pain, no diarrhea, no blood in stool, good appetite. no weight loss.      PSC  -dx at [place] through MRI  -Never required ERCP  -No episodes of cholangitis  -Alk phos up to 800s, was around 300 chronically, now 101  -Normal platelet count, normal kidney function.    -Liver biopsy 9/2017 showed PSC, no inflammation, read by Dr. Marte. LFTS at that time were   AST 99  -MRI done 8/8/17 in [place] showed findings c/w PSC and no mass.      UC  -On vedolizumab 8/16-8/2017 and it was stopped 2/2 ongoing inflammation on colonoscopy  -Dr. Watkins saw him this am. He is in clinical remission. 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     /75 (BP Location: Right arm, Patient Position: Sitting, Cuff Size: Adult Regular)   Pulse 71   Temp 97.4  F (36.3  C) (Oral)   Resp 16   Ht 1.93 m (6' 4\")   Wt 91.1 kg (200 lb 12.8 oz)   BMI 24.44 kg/m       GENERAL:  Pleasant, in no acute distress.   HEENT:  No icterus, no oral lesions.   LYMPH:  No supraclavicular or cervical lymphadenopathy.   CARDIOVASCULAR:  Regular rate and rhythm.   CHEST:  Lungs are clear.   ABDOMEN:  Bowel sounds are present, soft, nontender, nondistended, no hepatosplenomegaly.   EXTREMITIES:  No edema.   SKIN:  No rash.   NEUROLOGIC:  Speech is fluent and clear.  No asterixis or tremor.              Again, thank you for allowing me to participate in the care of your patient.      Sincerely,    Graciela Herrera MD      "

## 2019-06-11 NOTE — PATIENT INSTRUCTIONS
Great to see you today      Labs every 3 months while on humira        Follow up in one year        For questions regarding your care Monday through Friday, contact the RN GI care coordinator,  Call   [phone] . Your call will be  returned same day, or if consultation is needed with the provider, it may be following business day - or you may send a My Chart message.    For medication refills (prescribed by the GI clinic), contact your pharmacy.    For appointment rescheduling/cancellation, contact [phone]     After hours, or if you have an immediate GI concern and cannot wait for a return call, contact the GI Fellow at [phone] and select option #4.     Thanks Cheyenne Sams RN Care Coordinator for Dr. Watkins  Phone   [phone]

## 2019-06-12 DIAGNOSIS — K83.01 PSC (PRIMARY SCLEROSING CHOLANGITIS) (H): Primary | ICD-10-CM

## 2019-06-14 ENCOUNTER — TRANSFERRED RECORDS (OUTPATIENT)
Dept: HEALTH INFORMATION MANAGEMENT | Facility: CLINIC | Age: 29
End: 2019-06-14

## 2019-06-18 ENCOUNTER — DOCUMENTATION ONLY (OUTPATIENT)
Dept: GASTROENTEROLOGY | Facility: CLINIC | Age: 29
End: 2019-06-18

## 2019-06-18 NOTE — PROGRESS NOTES
Received lab results from [facility], [place], [place]; have been scanned into chart.

## 2019-07-09 ENCOUNTER — TELEPHONE (OUTPATIENT)
Dept: GASTROENTEROLOGY | Facility: CLINIC | Age: 29
End: 2019-07-09

## 2019-07-09 ENCOUNTER — PATIENT OUTREACH (OUTPATIENT)
Dept: GASTROENTEROLOGY | Facility: CLINIC | Age: 29
End: 2019-07-09

## 2019-07-09 NOTE — PROGRESS NOTES
Called pt pharmacy to check if lialda is being covered as pt states he received a message from the insurance that a prior might be necessary so need to switch. Pharmacy states needs a prior.

## 2019-07-09 NOTE — TELEPHONE ENCOUNTER
Prior Authorization Specialty Medication Request    Medication/Dose: Lialda Take 4 tablets (4.8 g) by mouth daily   ICD code (if different than what is on RX):    Previously Tried and Failed:    Current UC medications:   - Adalimumab 40mg q14 days (started 12/2018)  - Azathioprine 175 mg daily: Started 2018  - Lialda 4.8g daily   Prior IBD surgeries: None  Prior IBD Medications:  - Vedolizumab (8/2016 - 8/2017) stopped due to ongoing inflammation on colonoscopy (not clear if drug/Ab levels were checked)  - Infliximab-developed antibodies (8/2018)  - Budesonide  - Asacol   Important Lab Values:   Rationale:     Insurance Name:   [id number] SHIRIN MARK     Rel to sub: 01 - Self     Member ID: [id number]     Payor: [facility] Ph: [phone]     Benefit plan: [facility] Ph: [phone]     Group number: [id number]     Member effective dates: from 07/01/18        Insurance ID:   Insurance Phone Number:     Pharmacy Information (if different than what is on RX)  Name:    Phone:

## 2019-07-11 NOTE — TELEPHONE ENCOUNTER
PA Initiation    Medication: mesalamine (LIALDA) 1.2 g EC tablet -   Insurance Company: [employer] - Phone [phone] Fax [phone]  Pharmacy Filling the Rx: [facility] STORE [facility] [place] [place] - [facility] [address] AT [facility] [address]  Filling Pharmacy Phone: [phone]  Filling Pharmacy Fax: [phone]  Start Date: 7/11/2019         pink-tinged

## 2019-07-15 NOTE — TELEPHONE ENCOUNTER
PRIOR AUTHORIZATION DENIED    Medication: mesalamine (LIALDA) 1.2 g EC tablet - DENIED    Denial Date: 7/12/2019    Denial Rational:     MESALAMINE TABLET DR 1.2 gram, is a non-preferred product.  The preferred products, Delzicol, Pentasa, and mesalamine (rectal) are on our [facility] formulary  The following criteria have not been met:    Documentation (in the form of pharmacy claims or medical chart documentation) has not indicated that two preferred   alternatives have been tried and failed for a sufficient period of time to allow for a positive treatment outcome.    Documentation has not indicated that preferred alternatives are expected to cause harm or be ineffective, or are  contraindicated.    Appeal Information:

## 2019-07-16 ENCOUNTER — PATIENT OUTREACH (OUTPATIENT)
Dept: GASTROENTEROLOGY | Facility: CLINIC | Age: 29
End: 2019-07-16

## 2019-07-16 ENCOUNTER — TELEPHONE (OUTPATIENT)
Dept: GASTROENTEROLOGY | Facility: CLINIC | Age: 29
End: 2019-07-16

## 2019-07-16 DIAGNOSIS — K51.90 ULCERATIVE COLITIS (H): Primary | ICD-10-CM

## 2019-07-16 RX ORDER — MESALAMINE 400 MG/1
2400 CAPSULE, DELAYED RELEASE ORAL 2 TIMES DAILY
Qty: 360 CAPSULE | Refills: 5 | Status: SHIPPED | OUTPATIENT
Start: 2019-07-16 | End: 2020-04-17

## 2019-07-16 RX ORDER — MESALAMINE 400 MG/1
2400 CAPSULE, DELAYED RELEASE ORAL 2 TIMES DAILY
Qty: 360 CAPSULE | Refills: 5 | Status: SHIPPED | OUTPATIENT
Start: 2019-07-16 | End: 2019-07-16

## 2019-07-16 NOTE — PROGRESS NOTES
Prior for Lialda  Denied . Needs to try and fail Delzicol.  Delzicol prescription sent.   Pt sent a my chart message.

## 2019-07-22 NOTE — TELEPHONE ENCOUNTER
PA Initiation    Medication: mesalamine (DELZICOL) 400 MG DR capsule -   Insurance Company: [employer] - Phone [phone] Fax [phone]  Pharmacy Filling the Rx: [facility] [place] [place] - [facility] [address] AT [facility] [address]  Filling Pharmacy Phone: [phone]  Filling Pharmacy Fax: [phone]  Start Date: 7/22/2019

## 2019-07-23 ENCOUNTER — PATIENT OUTREACH (OUTPATIENT)
Dept: GASTROENTEROLOGY | Facility: CLINIC | Age: 29
End: 2019-07-23

## 2019-07-23 NOTE — PROGRESS NOTES
Father had called to say that now he was told my his son's pharmacy that now delzicol needed a prior. Discussed with prior team and prior has been started and will double check with insurance company.

## 2019-07-24 NOTE — TELEPHONE ENCOUNTER
Prior Authorization Not Needed per Insurance    Medication: mesalamine (DELZICOL) 400 MG DR capsule - NOT NEEDED  Insurance Company: [employer] - Phone [phone] Fax [phone]  Expected CoPay:      [facility] [address] [place] [address] [address] AT [facility]  Pharmacy Notified: Yes  Patient Notified: Comment:  **Instructed pharmacy to notify patient when script is ready to /ship.**

## 2019-09-19 DIAGNOSIS — K51.00 ULCERATIVE PANCOLITIS WITHOUT COMPLICATION (H): ICD-10-CM

## 2019-09-20 ENCOUNTER — TRANSFERRED RECORDS (OUTPATIENT)
Dept: HEALTH INFORMATION MANAGEMENT | Facility: CLINIC | Age: 29
End: 2019-09-20

## 2019-09-23 RX ORDER — AZATHIOPRINE 50 MG/1
TABLET ORAL
Qty: 105 TABLET | Refills: 5 | Status: SHIPPED | OUTPATIENT
Start: 2019-09-23 | End: 2021-06-08

## 2019-09-23 NOTE — TELEPHONE ENCOUNTER
Last Clinic Visit:6/11/19    Liver Function Studies -   Recent Labs   Lab Test 06/06/19  0853   PROTTOTAL 7.1   ALBUMIN 3.5   BILITOTAL 0.7   ALKPHOS 101   AST 21   ALT 27       CBC RESULTS:   Recent Labs   Lab Test 06/06/19  0853   WBC 4.9   RBC 4.62   HGB 13.8   HCT 41.9   MCV 91   MCH 29.9   MCHC 32.9   RDW 13.9

## 2019-10-01 ENCOUNTER — PATIENT OUTREACH (OUTPATIENT)
Dept: GASTROENTEROLOGY | Facility: CLINIC | Age: 29
End: 2019-10-01

## 2019-10-01 NOTE — PROGRESS NOTES
Pt had left a message to make sure his lab results are being sent tp Dr. Watkins. Pt said that he had not received a message back from Dr. Watkins about the labs. Told him that sometimes the lab results are scanned into the chart without the provider being notified. Will route to Dr. Watkins.

## 2019-11-26 ENCOUNTER — PATIENT OUTREACH (OUTPATIENT)
Dept: GASTROENTEROLOGY | Facility: CLINIC | Age: 29
End: 2019-11-26

## 2019-11-26 DIAGNOSIS — K51.90 ULCERATIVE COLITIS (H): Primary | ICD-10-CM

## 2019-12-05 ENCOUNTER — TRANSFERRED RECORDS (OUTPATIENT)
Dept: HEALTH INFORMATION MANAGEMENT | Facility: CLINIC | Age: 29
End: 2019-12-05

## 2020-01-13 ENCOUNTER — PATIENT OUTREACH (OUTPATIENT)
Dept: GASTROENTEROLOGY | Facility: CLINIC | Age: 30
End: 2020-01-13

## 2020-01-13 NOTE — PROGRESS NOTES
Pt had not read this my chart message so left him the following message      Uriah, Happy New Year   Your fecal calprotectin was good   No change in plan   Colonoscopy in summer.     Happy New Year.   Let us know if symptoms change or you have any further questions.  Thanks Cheyenne Sams RN Care Coordinator for Dr. Watkins  Phone   [phone]

## 2020-03-01 ENCOUNTER — HEALTH MAINTENANCE LETTER (OUTPATIENT)
Age: 30
End: 2020-03-01

## 2020-04-06 ENCOUNTER — TRANSFERRED RECORDS (OUTPATIENT)
Dept: HEALTH INFORMATION MANAGEMENT | Facility: CLINIC | Age: 30
End: 2020-04-06

## 2020-04-06 DIAGNOSIS — K51.90 ULCERATIVE COLITIS (H): ICD-10-CM

## 2020-04-08 NOTE — TELEPHONE ENCOUNTER
mesalamine (DELZICOL) 400 MG    Last Written Prescription Date:  7/16/19  Last Fill Quantity: 360,   # refills: 5  Last Office Visit : 6/11/19  Future Office visit:  NONE    Routing refill request to provider for review/approval because:  MED OVER RIDE

## 2020-04-15 ENCOUNTER — PATIENT OUTREACH (OUTPATIENT)
Dept: GASTROENTEROLOGY | Facility: CLINIC | Age: 30
End: 2020-04-15

## 2020-04-15 NOTE — PROGRESS NOTES
Contacted pt to set up appt with Dr. Watkins   Scheduled with Dr. Watkins on April 29 at 420.  Sent link via my chart.  Patient states he is doing well.

## 2020-04-16 DIAGNOSIS — K51.90 ULCERATIVE COLITIS (H): ICD-10-CM

## 2020-04-17 RX ORDER — MESALAMINE 400 MG/1
CAPSULE, DELAYED RELEASE ORAL
Qty: 360 CAPSULE | Refills: 1 | Status: SHIPPED | OUTPATIENT
Start: 2020-04-17 | End: 2021-06-08

## 2020-04-29 ENCOUNTER — VIRTUAL VISIT (OUTPATIENT)
Dept: GASTROENTEROLOGY | Facility: CLINIC | Age: 30
End: 2020-04-29
Payer: COMMERCIAL

## 2020-04-29 DIAGNOSIS — K51.00 ULCERATIVE PANCOLITIS WITHOUT COMPLICATION (H): Primary | ICD-10-CM

## 2020-04-29 NOTE — PROGRESS NOTES
"Uriah Mortensen is a 29 year old male who is being evaluated via a billable video visit.      The patient has been notified of following:     \"This video visit will be conducted via a call between you and your physician/provider. We have found that certain health care needs can be provided without the need for an in-person physical exam.  This service lets us provide the care you need with a video conversation.  If a prescription is necessary we can send it directly to your pharmacy.  If lab work is needed we can place an order for that and you can then stop by our lab to have the test done at a later time.    Video visits are billed at different rates depending on your insurance coverage.  Please reach out to your insurance provider with any questions.    If during the course of the call the physician/provider feels a video visit is not appropriate, you will not be charged for this service.\"    Patient has given verbal consent for Video visit? Yes    How would you like to obtain your AVS? E-Mail (inform patient AVS not encrypted)    Patient would like the video invitation sent by: Send to e-mail at: [email or web address]    Will anyone else be joining your video visit? Yes: Father. How would they like to receive their invitation? Text to cell phone: Same room Father will be presence with patient during visit.      Video-Visit Details    Type of service:  Video Visit    Video Start Time: 4:26 PM  Video End Time: 4:46    Originating Location (pt. Location): Home    Distant Location (provider location):  [facility] GASTROENTEROLOGY AND IBD CLINIC     Platform used for Video Visit: Kelly Watkins MD        "

## 2020-04-29 NOTE — PATIENT INSTRUCTIONS
I am glad you are tolerating your current therapy well without any issues.  Overall we need to determine if the inflammation in your colon has improved on the higher dose of Humira.    --Continue your routine blood work every 3 to 4 months    --Colonoscopy with me this summer    --We will work towards getting future colonoscopies locally coordinated as a note this is a lot of travel for you.    We will plan on another video visit to follow-up after the colonoscopy.

## 2020-04-29 NOTE — PROGRESS NOTES
IBD CLINIC VISIT     CC/REFERRING MD:  Elsie Ridley  REASON FOR CONSULTATION: Ulcerative colitis-PSC    ASSESSMENT/PLAN  29 year old man with PSC colitis on Azathioprine and Lialda who presents for follow-up.    1. Colitis: Likely PSC colitis-he remains in clinical remission on combination adalimumab, azathioprine, Lialda.  He has actually had a slight improvement in his stool pattern and has gained some weight which may suggest decreased inflammation.  Regardless he is due for endoscopic evaluation to assess response to weekly adalimumab, as well as IBD dysplasia surveillance.    We discussed the importance of remaining on the patient's current immunosuppressive therapy.  Discussed that the risks of coronavirus on these medications were small.  Discussed that should medications be interrupted, there could be a flare of the underlying inflammatory bowel disease which could require prednisone or hospitalization, both of which would likely increase the risk of coronavirus beyond that of the current medications.  Also discussed that should we enter a medications there is no guarantee that they would be effective again.  Finally we discussed the importance of hand hygiene, social isolation, and following all routine recommendations from the CDC.    --Continue adalimumab 40 mg every 7 days  --Continue azathioprine 175 mg a day  --Continue Lialda 4.8 g a day  --Colonoscopy this summer, exact timing to be dependent on coronavirus scheduling    2. PSC: Followed by Dr. Herrera of the [facility].   - Continue PSC care by Dr. Herrera through [facility]      3. Celiac disease: Per patient diagnosed at [facility], but path unavailable. Celiac serologies normal in [facility] records.  Duodenal biopsies from 8/14/17 normal (presumably on gluten free diet). Will have cramping and diarrhea if he accidentally eats gluten.  - Continue gluten free diet for now.     4. Eosinophilic esophagitis: Unclear if he has EoE or reactive changes  related to reflux.  Denies any dysphagia.  Had > 15 eosinophils/hpf on distal esophageal biopsies 12/17/18, but biopsies from mid-esophagus were normal.    - Continue PPI therapy    IBD HISTORY  Age at diagnosis: 23  Extent of disease: Pancolitis   Current UC medications:   - Adalimumab 40mg q14 days (started 12/2018)  - Adalimumab increased to 40mg q7 days summer 2019  - Azathioprine 175 mg daily: Started 2018  - Lialda 4.8g daily   Prior IBD surgeries: None  Prior IBD Medications:  - Vedolizumab (8/2016 - 8/2017) stopped due to ongoing inflammation on colonoscopy (not clear if drug/Ab levels were checked)  - Infliximab-developed antibodies (8/2018)  - Budesonide  - Asacol     DRUG MONITORING  TPMT enzyme activity: 29.4 (8/20/18)    6-TGN/6-MMPN levels: NA    Biologic concentration:  Infliximab 7/25/18-drug level 0, antibody greater than 200    DISEASE ASSESSMENT  Labs:  Recent Labs   Lab Test 12/18/18  0716 08/20/18  1615   CRP <2.9 <2.9   SED  --  8     Endoscopic assessment: eMayo 3 6/5/19 (unchanged from before)  Enterography: NA  Fecal calprotectin: NA  C diff: NA    IBD Health Care Maintenance:    Vaccinations:  All patients on biologics should avoid live vaccines.    -- Influenza (every year)  -- TdaP (every 10 years)  -- Pneumococcal Pneumonia (once plus booster at 5 years)  -- Prevnar 13  -- Yearly assessment for latent Tb (verbal screening and exam, PPD or QuantiFERON-Tb testing)    One time confirmation of immunity or serologies:  -- Hepatitis A (serologies or immunizations)  -- Hepatitis B (serologies or immunizations)  -- Varicella  -- MMR  -- HPV (all aged 18-26)  -- Meningococcal meningitis (all patients at risk for meningitis)    Bone mineral density screening   -- Recommend all patients supplement with calcium and vitamin D    Cancer Screening:  Colon cancer screening:  Given PSC colitis, recommend patient undergo regular yearly dysplasia surveillance   Next dysplasia screening is recommended  December 2018.    Skin cancer screening: Annual visual exam of skin by dermatologist since patient is immunocompromised    Misc:  -- Avoid tobacco use  -- Avoid NSAIDs as there is potentially a 25% chance of causing an IBD flare    Return to clinic in 5 months as previously scheduled    Thank you for this consultation.  It was a pleasure to participate in the care of this patient; please contact us with any further questions.     Jasen Watkins MD    [facility]  Inflammatory Bowel Disease Program  Division of Gastroenterology, Hepatology and Nutrition       HPI:   He is doing well.  No adverse reactions.    Taking adalimumab every 7 days     Having 1-2 stools a day, all formed.  No urgency, abdominal pain, nausea or vomiting.  Does note some weight gain.    Remains on gluten free.  No episodes of dysphasia.    Patient and father have questions regarding duration of every 7-day adalimumab  Also questions regarding coronavirus.    ROS:    No fevers or chills  No weight loss  No blurry vision, double vision or change in vision  No sore throat  No lymphadenopathy  No headache, paraesthesias, or weakness in a limb  No shortness of breath or wheezing  No chest pain or pressure  No arthralgias or myalgias  No rashes or skin changes  No odynophagia or dysphagia  No BRBPR, hematochezia, melena  No dysuria, frequency or urgency  No hot/cold intolerance  No anxiety or depression    Extra intestinal manifestations of IBD:  No uveitis/episcleritis  No aphthous ulcers   No arthritis   No erythema nodosum/pyoderma gangrenosum.     PERTINENT PAST MEDICAL HISTORY:  Past Medical History:   Diagnosis Date     Celiac disease      PSC (primary sclerosing cholangitis)      Ulcerative colitis (H)      PREVIOUS SURGERIES:  Past Surgical History:   Procedure Laterality Date     COLONOSCOPY N/A 6/5/2019    Procedure: COLONOSCOPY, WITH BIOPSY;  Surgeon: Jasen Watkins MD;  [facility]       PREVIOUS  ENDOSCOPY:    6/5/19: Moderate to severe (eMayo 3) right sided colitis - no dysplasia.     12/17/18: Moderate to severe right sided colitis - no dysplasia    Colonoscopy 4/11/18-granularity and loss of vascularity found in a continuous of circumferential pattern from the transverse colon to the hepatic flexure-mild.  Altered vascularity, erythema, granularity, mucus and shallow ulcerations from rectum to splenic flexure?.  TI appeared normal    Pathology 8/14/17  Final Pathologic Diagnosis   A. Terminal ileum, biopsy -- Small bowel mucosa with no significant   pathologic abnormality.   B. Colon, cecum and ascending, biopsies --       1.   Focal mildly active chronic colitis and pseudopolyp.         2.  No dysplasia seen.   C. Colon, transverse, biopsies --       1.  Marked architectural disorder and pseudopolyp with minimal   active colitis seen.       2.  No dysplasia seen.    D. Colon, descending, biopsy --       1.  Chronic colitis with focal mild activity.       2.  No dysplasia seen.   E. Colon, sigmoid, biopsy --       1.  Chronic colitis with mild activity.       2.  No dysplasia seen.   F. Colon, rectum, biopsy --       1.  Chronic colitis with minimal activity.       2.  No dysplasia seen.   G. Duodenum, biopsy --  Small bowel mucosa with no significant   pathologic abnormality.              H. Stomach, body and antrum, biopsy --        1.  Changes consistent with reactive gastropathy       2.  Negative for H. pylori   I. Esophagus, lower, biopsy --       1.   Squamous mucosa with nonspecific reactive changes.        2.   No eosinophilia or goblet cell metaplasia is seen.   J. Esophagus, middle, biopsy -- squamous mucosa with no significant   pathologic abnormality.     ALLERGIES:   Allergies   Allergen Reactions     Fish-Derived Products Hives     Positive skin test to fish and shellfish on 9/14/2016.     Shellfish Allergy Hives, Itching and Shortness Of Breath     Gluten Meal      PERTINENT  MEDICATIONS:    Current Outpatient Medications:      adalimumab (HUMIRA *CF*) 40 MG/0.4ML pen kit, Inject 0.4 mLs (40 mg) Subcutaneous every 7 days, Disp: 4 each, Rfl: 11     azaTHIOprine (IMURAN) 50 MG tablet, TAKE 3 AND ONE-HALF TABLETS(175 MG) BY MOUTH DAILY, Disp: 105 tablet, Rfl: 5     azaTHIOprine (IMURAN) 50 MG tablet, Take 3.5 tablets (175 mg) by mouth daily, Disp: 105 tablet, Rfl: 5     DELZICOL 400 MG DR capsule, TAKE 6 CAPSULES(2400 MG) BY MOUTH TWICE DAILY, Disp: 360 capsule, Rfl: 1     EPINEPHrine (EPIPEN 2-NAGI) 0.3 MG/0.3ML injection 2-pack, Inject when needed for emergency treatment of severe allergic reactions (anaphylaxis)., Disp: , Rfl:     SOCIAL HISTORY:  Social History     Socioeconomic History     Marital status: Single     Spouse name: Not on file     Number of children: Not on file     Years of education: Not on file     Highest education level: Not on file   Occupational History     Not on file   Social Needs     Financial resource strain: Not on file     Food insecurity     Worry: Not on file     Inability: Not on file     Transportation needs     Medical: Not on file     Non-medical: Not on file   Tobacco Use     Smoking status: Never Smoker     Smokeless tobacco: Never Used   Substance and Sexual Activity     Alcohol use: No     Alcohol/week: 0.0 standard drinks     Comment: None     Drug use: No     Sexual activity: Not on file   Lifestyle     Physical activity     Days per week: Not on file     Minutes per session: Not on file     Stress: Not on file   Relationships     Social connections     Talks on phone: Not on file     Gets together: Not on file     Attends Islam service: Not on file     Active member of club or organization: Not on file     Attends meetings of clubs or organizations: Not on file     Relationship status: Not on file     Intimate partner violence     Fear of current or ex partner: Not on file     Emotionally abused: Not on file     Physically abused: Not on file      Forced sexual activity: Not on file   Other Topics Concern     Not on file   Social History Narrative     Not on file     FAMILY HISTORY:  Family History   Problem Relation Age of Onset     Hypertension Father      Lupus Paternal Grandmother      Colon Cancer Paternal Grandfather      PHYSICAL EXAMINATION:  Vitals reviewed: There were no vitals taken for this visit.  Wt:   Wt Readings from Last 2 Encounters:   06/11/19 91.1 kg (200 lb 12.8 oz)   06/11/19 92.2 kg (203 lb 4.8 oz)      Constitutional - general appearance is well and in no acute distress. Body habitus normal  Eyes - No redness or discharge  Respiratory - No cough, unlabored breathing  Musculoskeletal - range of motion intact: Neck and arms  Skin - No discoloration or lesions  Neurological - No tremors, headaches  Psychiatric - No anxiety, alert & oriented      PERTINENT STUDIES:  Most recent CBC:  Recent Labs   Lab Test 06/06/19  0853 12/18/18  0716   WBC 4.9 4.9   HGB 13.8 14.0   HCT 41.9 42.5    234     Most recent hepatic panel:  Recent Labs   Lab Test 06/06/19  0853 12/18/18  0716   ALT 27 48   AST 21 37     Most recent creatinine:  Recent Labs   Lab Test 06/06/19  0853 12/18/18  0716   CR 1.03 1.10

## 2020-05-02 DIAGNOSIS — K51.00 ULCERATIVE PANCOLITIS WITHOUT COMPLICATION (H): ICD-10-CM

## 2020-05-04 ENCOUNTER — PATIENT OUTREACH (OUTPATIENT)
Dept: GASTROENTEROLOGY | Facility: CLINIC | Age: 30
End: 2020-05-04

## 2020-05-04 DIAGNOSIS — K51.90 ULCERATIVE COLITIS (H): Primary | ICD-10-CM

## 2020-05-04 NOTE — PROGRESS NOTES
Pt due for colonoscopy but delayed due to covid 19. New order placed to be done post covid.   In basket message to hepatology         overall he is doing well.  He needs a colonoscopy with me this summer.  I said July at the earliest.  Because the indication is surveillance and may be triaged on history of in September.    Otherwise no changes.    Otherwise he should have another video visit with me after the colonoscopy.    He is also due for a video visit with Dr. eHrrera after the colonoscopy. Not sure if you can pass on message to her schedulers

## 2020-05-11 ENCOUNTER — TELEPHONE (OUTPATIENT)
Dept: GASTROENTEROLOGY | Facility: CLINIC | Age: 30
End: 2020-05-11

## 2020-05-11 RX ORDER — MESALAMINE 400 MG/1
2400 CAPSULE, DELAYED RELEASE ORAL 2 TIMES DAILY
Qty: 360 CAPSULE | Refills: 1 | Status: SHIPPED | OUTPATIENT
Start: 2020-05-11 | End: 2020-08-18

## 2020-05-11 NOTE — TELEPHONE ENCOUNTER
Central Prior Authorization Team   Phone: [phone]    PA Initiation    Medication: mesalamine (DELZICOL) 400 MG DR capsule   Insurance Company: [employer] - Phone [phone] Fax [phone]  Pharmacy Filling the Rx: [id number] - [place], [place] - [facility] [address] [facility] [address]  Filling Pharmacy Phone: [phone]  Filling Pharmacy Fax: [phone]  Start Date: 5/11/2020

## 2020-05-11 NOTE — TELEPHONE ENCOUNTER
Pharmacy has started a Prior Authorization request via Cover My Meds for Mesalamine (DELZICOL) 400 MG DR capsule,  Key: [id number]

## 2020-05-12 NOTE — TELEPHONE ENCOUNTER
Prior Authorization Not Needed per Insurance. No P/A is needed. Plan covers the name brand Delzicol.    Medication: mesalamine (DELZICOL) 400 MG DR capsule   Insurance Company: [employer] - Phone [phone] Fax [phone]  Expected CoPay:      Pharmacy Filling the Rx: [id number] - [place], [place] - [facility] [address] [facility]  Pharmacy Notified: Yes - faxed this letter to pharmacy for their reference, with a note to please let patient know when rx is ready.  Patient Notified:

## 2020-06-08 ENCOUNTER — TELEPHONE (OUTPATIENT)
Dept: GASTROENTEROLOGY | Facility: CLINIC | Age: 30
End: 2020-06-08

## 2020-06-08 NOTE — TELEPHONE ENCOUNTER
Prior Authorization Approval    Authorization Effective Date:  05/09/20  Authorization Expiration Date:  06/09/2021  Medication: humira pen  Approved Dose/Quantity: 4/28  Reference #: [id number]   Insurance Company: [employer] - Phone [phone] Fax [phone]  Expected CoPay:     25  CoPay Card Available:  n/a    Foundation Assistance Needed:    Which Pharmacy is filling the prescription (Not needed for infusion/clinic administered): [facility] - [address]  Pharmacy Notified: Yes  Patient Notified: Yes          PA Initiation    Medication: humira pen  Insurance Company: [employer] - Phone [phone] Fax [phone]  Pharmacy Filling the Rx: [facility] - [address]  Filling Pharmacy Phone: [phone]  Filling Pharmacy Fax: [phone]  Start Date: 6/8/2020

## 2020-06-09 ENCOUNTER — PATIENT OUTREACH (OUTPATIENT)
Dept: GASTROENTEROLOGY | Facility: CLINIC | Age: 30
End: 2020-06-09

## 2020-06-09 DIAGNOSIS — K51.00 ULCERATIVE PANCOLITIS WITHOUT COMPLICATION (H): ICD-10-CM

## 2020-06-09 DIAGNOSIS — Z11.59 ENCOUNTER FOR SCREENING FOR OTHER VIRAL DISEASES: Primary | ICD-10-CM

## 2020-06-09 RX ORDER — AZATHIOPRINE 50 MG/1
175 TABLET ORAL DAILY
Qty: 105 TABLET | Refills: 5 | Status: SHIPPED | OUTPATIENT
Start: 2020-06-09 | End: 2021-03-01

## 2020-06-09 RX ORDER — AZATHIOPRINE 50 MG/1
175 TABLET ORAL DAILY
Qty: 105 TABLET | Refills: 0 | OUTPATIENT
Start: 2020-06-09

## 2020-06-09 NOTE — PROGRESS NOTES
Contacted patient in response to a voice mail message. Order for colonoscopy is in the chart. Left a message for the number to call to schedule.

## 2020-07-16 ENCOUNTER — PATIENT OUTREACH (OUTPATIENT)
Dept: GASTROENTEROLOGY | Facility: CLINIC | Age: 30
End: 2020-07-16

## 2020-07-16 DIAGNOSIS — K51.90 ULCERATIVE COLITIS (H): Primary | ICD-10-CM

## 2020-07-24 ENCOUNTER — TRANSFERRED RECORDS (OUTPATIENT)
Dept: HEALTH INFORMATION MANAGEMENT | Facility: CLINIC | Age: 30
End: 2020-07-24

## 2020-07-27 ENCOUNTER — TELEPHONE (OUTPATIENT)
Dept: GASTROENTEROLOGY | Facility: CLINIC | Age: 30
End: 2020-07-27

## 2020-07-29 ENCOUNTER — TELEPHONE (OUTPATIENT)
Dept: GASTROENTEROLOGY | Facility: OUTPATIENT CENTER | Age: 30
End: 2020-07-29

## 2020-07-29 DIAGNOSIS — K51.90 ULCERATIVE COLITIS (H): Primary | ICD-10-CM

## 2020-07-29 NOTE — TELEPHONE ENCOUNTER
Patient taking any blood thinners ? No      Heart disease ? Denies      Lung disease ? Denies      Sleep apnea ? Denies      Diabetic ? Denies      Kidney disease ? Denies      Electronic implanted medical devices ? Denies      Are you taking any narcotic pain medication ? Denies      PTSD ? N/a      Prep instructions reviewed with patient ? Patient declined review.  policy, MAC sedation plan reviewed.     Yes    Pharmacy : [facility]     Indication for procedure : Ulcerative colitis (H)     Referring provider : Elsie Ridley Thompson, Julie Ann Kammann, MD      Arrival Time : 6:30 AM

## 2020-08-03 ENCOUNTER — TRANSFERRED RECORDS (OUTPATIENT)
Dept: HEALTH INFORMATION MANAGEMENT | Facility: CLINIC | Age: 30
End: 2020-08-03

## 2020-08-03 ENCOUNTER — DOCUMENTATION ONLY (OUTPATIENT)
Dept: GASTROENTEROLOGY | Facility: OUTPATIENT CENTER | Age: 30
End: 2020-08-03
Payer: COMMERCIAL

## 2020-08-04 LAB — COPATH REPORT: NORMAL

## 2020-08-13 DIAGNOSIS — K51.90 ULCERATIVE COLITIS (H): ICD-10-CM

## 2020-08-17 NOTE — PROGRESS NOTES
"Uriah Mortensen is a 30 year old male who is being evaluated via a billable video visit.      The patient has been notified of following:   \"This video visit will be conducted via a call between you and your physician/provider. We have found that certain health care needs can be provided without the need for an in-person physical exam.  This service lets us provide the care you need with a video conversation.  If a prescription is necessary we can send it directly to your pharmacy.  If lab work is needed we can place an order for that and you can then stop by our lab to have the test done at a later time. Video visits are billed at different rates depending on your insurance coverage.  Please reach out to your insurance provider with any questions.  If during the course of the call the physician/provider feels a video visit is not appropriate, you will not be charged for this service.\"   Patient has given verbal consent for Video visit? Yes      Type of service:  Video Visit  Video Start Time: 11:36  Video End Time 11:49  Patient location: home  Will anyone else be joining your video visit?   {If patient encounters technical issues they should call [phone] :4  Distant Location (provider location):  [facility] HEPATOLOGY   Mode of Communication:  Video Conference via [employer]  I have reviewed and updated the patient's Past Medical History, Social History, Family History and Medication List.    A/P  30 y M with PSC and UC. Chronic elevations in liver enzymes with biopsy 2017 showing just PSC. Labs are much improved with normal values now.      HCC/CCA Screening Will get MRI at home sometime in the next few months. Ordered.    US In clinical remission. ON Humira per Dr. Watkins  RTC in 1 year     Graciela Herrera MD  Hepatology/Liver Transplant  Medical Director, Liver Transplantation  [facility]  ===================================================================  S: 30 y M with PSC diagnosed in " 2014.    Also has UC pancolitis and celiac. Sees Dr. Watkins. On weekly adalimumab. Last colonoscopy a few weeks ago and in remission clinically but with pancolitis as on past colonoscopies.     He feels well. No fever, no abdominal pain, no diarrhea, no blood in stool, good appetite, no weight loss.      PSC  -dx at [place] through MRI  -Never required ERCP  -No episodes of cholangitis  -Alk phos up to 800s, was around 300 chronically, now 101  -Normal platelet count, normal kidney function.    -Liver biopsy 9/2017 showed PSC, no inflammation, read by Dr. Marte. LFTS at that time were   AST 99  -MRI 8/8/17 in [place] showed findings c/w PSC and no mass.   -MRI 1/21/19 in [place] fatty infiltration, no mass, findings c/w PSC.     UC  -On vedolizumab 8/16-8/2017 and it was stopped 2/2 ongoing inflammation on colonoscopy  -Dr. Watkins. 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 He is doing a lot of fill in work and helping with online work.  He is anticipating in person classes this fall as he is in a low Covid incidence region and his school is private.

## 2020-08-17 NOTE — TELEPHONE ENCOUNTER
mesalamine (DELZICOL) 400 MG   Last Written Prescription Date:  4/29/20  Last Fill Quantity: 360,   # refills: 1  Last Office Visit : 4/29/20  Future Office visit:  NONE    Routing refill request to provider for review/approval because:  DOES GI WANT TO CONTINUE MED/RF   * LAST RF LIMITED ?

## 2020-08-18 ENCOUNTER — VIRTUAL VISIT (OUTPATIENT)
Dept: GASTROENTEROLOGY | Facility: CLINIC | Age: 30
End: 2020-08-18
Attending: INTERNAL MEDICINE
Payer: COMMERCIAL

## 2020-08-18 DIAGNOSIS — K83.01 PSC (PRIMARY SCLEROSING CHOLANGITIS) (H): Primary | ICD-10-CM

## 2020-08-18 RX ORDER — MESALAMINE 400 MG/1
2400 CAPSULE, DELAYED RELEASE ORAL 2 TIMES DAILY
Qty: 360 CAPSULE | Refills: 1 | Status: SHIPPED | OUTPATIENT
Start: 2020-08-18 | End: 2020-11-10

## 2020-08-18 ASSESSMENT — PAIN SCALES - GENERAL: PAINLEVEL: NO PAIN (0)

## 2020-08-18 NOTE — PROGRESS NOTES
"Uriah Mortensen is a 30 year old male who is being evaluated via a billable video visit.      The patient has been notified of following:     \"This video visit will be conducted via a call between you and your physician/provider. We have found that certain health care needs can be provided without the need for an in-person physical exam.  This service lets us provide the care you need with a video conversation.  If a prescription is necessary we can send it directly to your pharmacy.  If lab work is needed we can place an order for that and you can then stop by our lab to have the test done at a later time.    Video visits are billed at different rates depending on your insurance coverage.  Please reach out to your insurance provider with any questions.    If during the course of the call the physician/provider feels a video visit is not appropriate, you will not be charged for this service.\"    Patient has given verbal consent for Video visit? Yes  How would you like to obtain your AVS? MyChart  If you are dropped from the video visit, the video invite should be resent to: Text to cell phone: [phone]  Will anyone else be joining your video visit? No  {If patient encounters technical issues they should call [phone] :[id number]}      Video-Visit Details    Type of service:  Video Visit    Video Start Time: {video visit start/end time:152948}  Video End Time: {video visit start/end time:152948}    Originating Location (pt. Location): {video visit patient location:214598::\"Home\"}    Distant Location (provider location):  [facility] HEPATOLOGY     Platform used for Video Visit: {Virtual Visit Platforms:819273::\"[employer]\"}    {signature options:414994}        "

## 2020-08-18 NOTE — LETTER
"    8/18/2020         RE: Uriah Mortensen  [address] [place] [address]        Dear Colleague,    Thank you for referring your patient, Uriah Mortensen, to the [facility] HEPATOLOGY. Please see a copy of my visit note below.    Uriah Mortensen is a 30 year old male who is being evaluated via a billable video visit.      The patient has been notified of following:   \"This video visit will be conducted via a call between you and your physician/provider. We have found that certain health care needs can be provided without the need for an in-person physical exam.  This service lets us provide the care you need with a video conversation.  If a prescription is necessary we can send it directly to your pharmacy.  If lab work is needed we can place an order for that and you can then stop by our lab to have the test done at a later time. Video visits are billed at different rates depending on your insurance coverage.  Please reach out to your insurance provider with any questions.  If during the course of the call the physician/provider feels a video visit is not appropriate, you will not be charged for this service.\"   Patient has given verbal consent for Video visit? Yes      Type of service:  Video Visit  Video Start Time: 11:36  Video End Time 11:49  Patient location: home  Will anyone else be joining your video visit?   {If patient encounters technical issues they should call [phone] :  Distant Location (provider location):  [facility] HEPATOLOGY   Mode of Communication:  Video Conference via [employer]  I have reviewed and updated the patient's Past Medical History, Social History, Family History and Medication List.    A/P  30 y M with PSC and UC. Chronic elevations in liver enzymes with biopsy 2017 showing just PSC. Labs are much improved with normal values now.      HCC/CCA Screening Will get MRI at home sometime in the next few months. Ordered.    US In clinical remission. ON Humira per " Dr. Watkins  RTC in 1 year     Graciela Herrera MD  Hepatology/Liver Transplant  Medical Director, Liver Transplantation  [facility]  ===================================================================  S: 30 y M with PSC diagnosed in 2014.    Also has UC pancolitis and celiac. Sees Dr. Watkins. On weekly adalimumab. Last colonoscopy a few weeks ago and in remission clinically but with pancolitis as on past colonoscopies.     He feels well. No fever, no abdominal pain, no diarrhea, no blood in stool, good appetite, no weight loss.      PSC  -dx at [facility] through MRI  -Never required ERCP  -No episodes of cholangitis  -Alk phos up to 800s, was around 300 chronically, now 101  -Normal platelet count, normal kidney function.    -Liver biopsy 9/2017 showed PSC, no inflammation, read by Dr. Marte. LFTS at that time were   AST 99  -MRI 8/8/17 in [place] showed findings c/w PSC and no mass.   -MRI 1/21/19 in [place] fatty infiltration, no mass, findings c/w PSC.     UC  -On vedolizumab 8/16-8/2017 and it was stopped 2/2 ongoing inflammation on colonoscopy  -Dr. Watkins. 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 He is doing a lot of fill in work and helping with online work.  He is anticipating in person classes this fall as he is in a low Covid incidence region and his school is private.      Again, thank you for allowing me to participate in the care of your patient.        Sincerely,        Graciela Herrera MD

## 2020-10-30 DIAGNOSIS — K52.9 IBD (INFLAMMATORY BOWEL DISEASE): ICD-10-CM

## 2020-11-03 NOTE — TELEPHONE ENCOUNTER
adalimumab (HUMIRA *CF*) 40 MG/0.4ML pen kit   Inject 0.4 mLs (40 mg) Subcutaneous every 7 days   Last Written Prescription Date:  7/10/20  Last Fill Quantity: 4 each ,   # refills: 2  Last Office Visit :  8/18/20 Herrera/hepatology  4/29/20 Roland --Continue adalimumab 40 mg every 7 days  Future Office visit:  None  Colonoscopy 8/4/20 The pathology was consistent with the endoscopy. There are patches of severe inflammation, although clearly areas of healing as well. No change in the plan as previously discussed.       Labs done [facility] /scanned image 7/24/20 ( CBC, LFT, ESR)         [facility] Dr Watkins:The results of the colonoscopy and pathology were overall favorable.  While there are some patches of severe inflammation, overall the colitis has regressed and 90-95% of the colon is normal, which is excellent.      If you would like an earlier visit, we can do that, although at this point 6 month follow up is fine.  We can do either visit as a video visit.      Yes, please continue all current meds for now.

## 2020-11-06 DIAGNOSIS — K51.90 ULCERATIVE COLITIS (H): ICD-10-CM

## 2020-11-10 NOTE — TELEPHONE ENCOUNTER
DELZICOL 400MG CAPSULES      Last Written Prescription Date:  8/18/20  Last Fill Quantity: 360,   # refills: 1  Last Office Visit : 8/18/20  Future Office visit:  None scheduled    Routing refill request to provider for review/approval because:  Overdue for creatinine    Lab Test 06/06/19  0853   CR 1.03     Nothing more recent in care everywhere nor media.  Most recent ALT and CBC  7/24/20 scanned into media, results within limits

## 2020-11-25 RX ORDER — MESALAMINE 400 MG/1
2400 CAPSULE, DELAYED RELEASE ORAL 2 TIMES DAILY
Qty: 360 CAPSULE | Refills: 3 | Status: SHIPPED | OUTPATIENT
Start: 2020-11-25 | End: 2021-08-18

## 2020-12-14 ENCOUNTER — HEALTH MAINTENANCE LETTER (OUTPATIENT)
Age: 30
End: 2020-12-14

## 2021-01-10 DIAGNOSIS — K51.00 ULCERATIVE PANCOLITIS WITHOUT COMPLICATION (H): ICD-10-CM

## 2021-01-12 NOTE — TELEPHONE ENCOUNTER
azaTHIOprine (IMURAN) 50 MG tablet  Last Written Prescription Date:  6/9/2020  Last Fill Quantity: 105,   # refills: 5  Last Office Visit : 8/18/2020  Future Office visit:  None  Clarification of orders:  Is Pt taking 3.5 tablets daily?  Or  Taking 2 Mg's daily?    Or in chart and order from Pharmacy do not match.   Please advise       Odessa Burroughs RN  Central Triage Red Flags/Med Refills

## 2021-02-01 DIAGNOSIS — K52.9 IBD (INFLAMMATORY BOWEL DISEASE): ICD-10-CM

## 2021-02-04 NOTE — TELEPHONE ENCOUNTER
[facility] Call Center    Phone Message    May a detailed message be left on voicemail: yes     Reason for Call: Medication Refill Request    Has the patient contacted the pharmacy for the refill? Yes   Name of medication being requested: Humira  Provider who prescribed the medication: Dr. Watkins  Pharmacy: [facility]  Date medication is needed: ASAP          Action Taken: Message routed to:  [facility] ([facility] GI    Travel Screening: Not Applicable

## 2021-02-09 ENCOUNTER — PATIENT OUTREACH (OUTPATIENT)
Dept: GASTROENTEROLOGY | Facility: CLINIC | Age: 31
End: 2021-02-09

## 2021-02-09 DIAGNOSIS — K52.9 IBD (INFLAMMATORY BOWEL DISEASE): ICD-10-CM

## 2021-02-09 NOTE — PROGRESS NOTES
Patient calls to state he was due for humira on February 7 and was told that could not have filled by [facility] pharmacy as provider was out of clinic      Pt is overdue for labs and also clinic appointment  States that that labs have slipped his mind  Will fax orders to [facility] in [place]  Pt now scheduled for clinic appt in April  In basket to team for humira   Would like [facility] to send to [facility] pharmacy instead of his home.

## 2021-02-10 ENCOUNTER — DOCUMENTATION ONLY (OUTPATIENT)
Dept: GASTROENTEROLOGY | Facility: CLINIC | Age: 31
End: 2021-02-10

## 2021-03-01 DIAGNOSIS — K51.00 ULCERATIVE PANCOLITIS WITHOUT COMPLICATION (H): ICD-10-CM

## 2021-03-01 RX ORDER — AZATHIOPRINE 50 MG/1
175 TABLET ORAL DAILY
Qty: 105 TABLET | Refills: 5 | Status: SHIPPED | OUTPATIENT
Start: 2021-03-01 | End: 2021-06-10

## 2021-03-01 NOTE — PROGRESS NOTES
Refilled azathioprine.  Patient has appointment for lab draws this week  Also has a follow up appointment.

## 2021-03-02 ENCOUNTER — TRANSFERRED RECORDS (OUTPATIENT)
Dept: HEALTH INFORMATION MANAGEMENT | Facility: CLINIC | Age: 31
End: 2021-03-02

## 2021-04-04 RX ORDER — AZATHIOPRINE 50 MG/1
175 TABLET ORAL DAILY
Qty: 315 TABLET | Refills: 0 | Status: SHIPPED | OUTPATIENT
Start: 2021-04-04 | End: 2022-01-05

## 2021-04-12 ENCOUNTER — VIRTUAL VISIT (OUTPATIENT)
Dept: GASTROENTEROLOGY | Facility: CLINIC | Age: 31
End: 2021-04-12
Payer: COMMERCIAL

## 2021-04-12 VITALS — BODY MASS INDEX: 27.4 KG/M2 | HEIGHT: 76 IN | WEIGHT: 225 LBS

## 2021-04-12 DIAGNOSIS — K51.00 ULCERATIVE PANCOLITIS WITHOUT COMPLICATION (H): Primary | ICD-10-CM

## 2021-04-12 PROCEDURE — 99214 OFFICE O/P EST MOD 30 MIN: CPT | Mod: GT | Performed by: INTERNAL MEDICINE

## 2021-04-12 ASSESSMENT — MIFFLIN-ST. JEOR: SCORE: 2092.09

## 2021-04-12 NOTE — PATIENT INSTRUCTIONS
-- Continue Humira and azathiorine and mesalamine at current doses    -- Colonoscopy in [place]     -- Follow-up in the IBD program in 6 months.

## 2021-04-12 NOTE — PROGRESS NOTES
"Uriah Mortensen is a 30 year old male who is being evaluated via a billable video visit.      The patient has been notified of following:     \"This video visit will be conducted via a call between you and your physician/provider. We have found that certain health care needs can be provided without the need for an in-person physical exam.  This service lets us provide the care you need with a video conversation.  If a prescription is necessary we can send it directly to your pharmacy.  If lab work is needed we can place an order for that and you can then stop by our lab to have the test done at a later time.    If during the course of the call the physician/provider feels a video visit is not appropriate, you will not be charged for this service.\"     Patient confirmed that they are in [place] for today's visit yes.    Video-Visit Details  Type of service:  Video Visit    Video Start Time: 2:43 PM  Video End Time:  2:50 PM    Originating Location (pt. Location): [place]    Distant Location (provider location):  [facility] [place]     Platform used: SheerID    Virtual visit for patient conducted via three way video conference with myself, patient and Dr. Galloway. I reviewed the history and abbreviated physical exam and discussed the management with Dr. Galloway on 4/12/2021. I reviewed the note and there are no changes to the past medical, family or social history.  A complete 10 point review of systems was obtained. Please see the HPI for pertinent positives and negatives. All other systems were reviewed and were found to be negative. I agree with the documented findings and plan of care as outlined.    On endoscopy, one area of severe disease in ascending colon on the crests of his colonic folds. He was in clinical remission at that time, and continues in clinical remission on combination adalimumab, azathioprine and mesalamine.     He will be due for colonoscopy this summer.  Will alternate " location of colonoscopy - OK to get this years in [place]. 2022 colonoscopy with me.     -Dr. Watkins

## 2021-04-12 NOTE — LETTER
"    4/12/2021         RE: Uriah Mortensen  [address] [place] [address]        Dear Colleague,    Thank you for referring your patient, Uriah Mortensen, to the [facility] [place]. Please see a copy of my visit note below.    Uriah Mortensen is a 30 year old male who is being evaluated via a billable video visit.      The patient has been notified of following:     \"This video visit will be conducted via a call between you and your physician/provider. We have found that certain health care needs can be provided without the need for an in-person physical exam.  This service lets us provide the care you need with a video conversation.  If a prescription is necessary we can send it directly to your pharmacy.  If lab work is needed we can place an order for that and you can then stop by our lab to have the test done at a later time.    If during the course of the call the physician/provider feels a video visit is not appropriate, you will not be charged for this service.\"     Patient confirmed that they are in [place] for today's visit yes.    Video-Visit Details  Type of service:  Video Visit    Video Start Time: 2:43 PM  Video End Time:  2:50 PM    Originating Location (pt. Location): Home    Distant Location (provider location):  [facility] [place]     Platform used: ZendyPlace    Virtual visit for patient conducted via three way video conference with myself, patient and Dr. Galloway. I reviewed the history and abbreviated physical exam and discussed the management with Dr. Galloway on 4/12/2021. I reviewed the note and there are no changes to the past medical, family or social history.  A complete 10 point review of systems was obtained. Please see the HPI for pertinent positives and negatives. All other systems were reviewed and were found to be negative. I agree with the documented findings and plan of care as outlined.    On endoscopy, " one area of severe disease in ascending colon on the crests of his colonic folds. He was in clinical remission at that time, and continues in clinical remission on combination adalimumab, azathioprine and mesalamine.     He will be due for colonoscopy this summer.  Will alternate location of colonoscopy - OK to get this years in [place]. 2022 colonoscopy with me.     -Dr. Watkins              IBD CLINIC VISIT    CC/REFERRING MD:  Referred Self  REASON FOR CONSULTATION: f/u PSC colitis    ASSESSMENT/PLAN  30 year old man with PSC colitis on Azathioprine, azathioprine, and Lialda who presents for follow-up.    1. Colitis  Likely PSC colitis. He remains in clinical remission on combination of humira, mesalamine, and azathioprine (Adalimumab 40mg q14 days started 12/2018. Adalimumab increased to 40mg q7 days summer 2019). During recent 8/3/2020 cecal bx showed severe active colitis (iris grade 4), with left colon random bx showing no active colitis, and 2 mm sigmoid hyperplastic polyp. Despite the severe colitis found in his cecum on bx, he continues to remain clinically in remission.  - continue with PTA mesalamine 4.8g/day, humira 40 mg  q7days, azathioprine 50mg daily  - labs [id number]/year (LFT, CRP, ESR, CBC)  - needs yearly dysplasia colonoscopy, due 8/2021 (ordered)    2. PSC  - continue to follow with Dr. Herrera in liver clinic    3. Reported celiac disease  Per patient diagnosed at [facility], but path unavailable. Celiac serologies normal in [facility] records.  Duodenal biopsies from 8/14/17 normal (presumably on gluten free diet). Will have cramping and diarrhea if he accidentally eats gluten.  - Continue gluten free diet for now    4. Eosinophilic esophagitis  Unclear if he has EoE or reactive changes related to reflux.  Denies any dysphagia; his father has dysphagia history.  Had > 15 eosinophils/hpf on distal esophageal biopsies 12/17/18, but biopsies from mid-esophagus were normal.    - does not take PPI    5.  IBD dysplasia surveillance:   Given his pan colitis and his recent cecal bx on 8/3/2020 showing severe active colitis, will need yearly surveillance, despite his clinical remission.  - yearly dysplasia colonoscopy with bx due 8/2021 (ordered)     IBD HISTORY  Age at diagnosis: 23  Extent of disease: Pancolitis   Current UC medications:   - Adalimumab 40mg q14 days (started 12/2018)  - Adalimumab increased to 40mg q7 days summer 2019  - Azathioprine 175 mg daily: Started 2018, but currently on 50 mg daily   - Lialda 4.8g daily   Prior IBD surgeries: None  Prior IBD Medications:  - Vedolizumab (8/2016 - 8/2017) stopped due to ongoing inflammation on colonoscopy (not clear if drug/Ab levels were checked)  - Infliximab-developed antibodies (8/2018)  - Budesonide  - Asacol      DRUG MONITORING  TPMT enzyme activity: 29.4 (8/20/18)     6-TGN/6-MMPN levels: NA     Biologic concentration:  Infliximab 7/25/18-drug level 0, antibody greater than 200       CRP Inflammation   Date Value Ref Range Status   12/18/2018 <2.9 0.0 - 8.0 mg/L Final     Sed Rate   Date Value Ref Range Status   08/20/2018 8 0 - 15 mm/h Final       Endoscopic assessment: eMayo 3 6/5/19 (unchanged from before)  Enterography: NA  Fecal calprotectin: NA  C diff: NA     IBD Health Care Maintenance:     Vaccinations:  All patients on biologics should avoid live vaccines.    -- Influenza (every year)  -- TdaP (every 10 years)  -- Pneumococcal Pneumonia (once plus booster at 5 years)  -- Prevnar 13  -- Yearly assessment for latent Tb (verbal screening and exam, PPD or QuantiFERON-Tb testing)     One time confirmation of immunity or serologies:  -- Hepatitis A (serologies or immunizations)  -- Hepatitis B (serologies or immunizations)  -- Varicella  -- MMR  -- HPV (all aged 18-26)  -- Meningococcal meningitis (all patients at risk for meningitis)     Bone mineral density screening   -- Recommend all patients supplement with calcium and vitamin D     Cancer  Screening:  Colon cancer screening:  Given PSC colitis, recommend patient undergo regular yearly dysplasia surveillance.     Skin cancer screening: Annual visual exam of skin by dermatologist since patient is immunocompromised     Misc:  -- Avoid tobacco use  -- Avoid NSAIDs as there is potentially a 25% chance of causing an IBD flare    Return to clinic in 6 months    Thank you for this consultation.  It was a pleasure to participate in the care of this patient; please contact us with any further questions.  I spent a total of 20 minutes, face to face, was spent with this patient, >50% of which was counseling regarding the above delineated issues.    This patient was staffed and discussed with Dr. Watkins.    Nannette Galloway MD  PGY2  IM resident  [phone]      Jasen Watkins MD    [facility]  Inflammatory Bowel Disease Program  Division of Gastroenterology, Hepatology and Nutrition    HPI:     Patient is a 30 year old male, with h/o PSC pan-colitis (dx age 23) on azithioprine, humira, and lialda), presenting for f/u appt.    Patient was last seen on 2020, was in clinical remission on humira q7 days, azathioprine 50 mg daily, mesalamine.     Last colonoscopy in 8/3/2020 with cecal bx showing severe active colitis, iris grade 4. Colon, left bx showed no active colitis. 2mm sigmoid hyperplastic polyp.    For the past year, has not noticed any flares. Has been taking humira q7 days for the past 1.5 years. Continues to be on gluten free diet for his celiac disease, which he finds very helpful. Has normal BM 1-3x/day. Denied bloody stools, fevers, chills, abdminal pain. Denied joint pains, eyes issues. Have not had any issues with dysphagia.    Woody score:   Stool freq: 2 (3-4 stools/day more than normal)  Rectal bleedin (None)  PGA: 0 (normal)  Endoscopy: 3 (Severe)    Extra intestinal manifestations of IBD:  No uveitis/episcleritis  No aphthous ulcers   No arthritis   No  "erythema nodosum/pyoderma gangrenosum.     sIBDQ:  IBDQ Score Date IBDQ - Total Score  (Higher score better)   6/9/2019 65   12/17/2018 64   8/18/2018 65        Last histologic activity:  8/3/2020 cecal bx on colonoscopy showed severe active colitis. Left colon bx showed no active colitis      ROS:    Constitutional, HEENT, cardiovascular, pulmonary, GI, , musculoskeletal, neuro, skin, endocrine and psych systems are negative, except as otherwise noted.    PHYSICAL EXAMINATION:  Constitutional: aaox3, cooperative, pleasant, not dyspneic/diaphoretic, no acute distress  Vitals reviewed: Ht 1.93 m (6' 4\")   Wt 102.1 kg (225 lb)   BMI 27.39 kg/m    Wt:   Wt Readings from Last 2 Encounters:   04/12/21 102.1 kg (225 lb)   06/11/19 91.1 kg (200 lb 12.8 oz)      Eyes: Sclera anicteric/injected  Respiratory: Unlabored breathing  Skin: no jaundice  Psych: Normal affect    PERTINENT PAST MEDICAL HISTORY:  Past Medical History:   Diagnosis Date     Celiac disease      PSC (primary sclerosing cholangitis)      Ulcerative colitis ([facility])        PREVIOUS SURGERIES:  Past Surgical History:   Procedure Laterality Date     COLONOSCOPY N/A 6/5/2019    Procedure: COLONOSCOPY, WITH BIOPSY;  Surgeon: Jasen Watkins MD;  [facility]       ALLERGIES:     Allergies   Allergen Reactions     Fish-Derived Products Hives     Positive skin test to fish and shellfish on 9/14/2016.     Shellfish Allergy Hives, Itching and Shortness Of Breath     Gluten Meal        PERTINENT MEDICATIONS:    Current Outpatient Medications:      adalimumab (HUMIRA *CF*) 40 MG/0.4ML pen kit, Inject 0.4 mLs (40 mg) Subcutaneous every 7 days, Disp: 4 each, Rfl: 5     azaTHIOprine (IMURAN) 50 MG tablet, Take 3.5 tablets (175 mg) by mouth daily, Disp: 315 tablet, Rfl: 0     azaTHIOprine (IMURAN) 50 MG tablet, Take 3.5 tablets (175 mg) by mouth daily, Disp: 105 tablet, Rfl: 5     azaTHIOprine (IMURAN) 50 MG tablet, TAKE 3 AND ONE-HALF TABLETS(175 MG) BY MOUTH " DAILY, Disp: 105 tablet, Rfl: 5     DELZICOL 400 MG DR capsule, TAKE 6 CAPSULES(2400 MG) BY MOUTH TWICE DAILY, Disp: 360 capsule, Rfl: 1     EPINEPHrine (EPIPEN 2-NAGI) 0.3 MG/0.3ML injection 2-pack, Inject when needed for emergency treatment of severe allergic reactions (anaphylaxis)., Disp: , Rfl:      mesalamine (DELZICOL) 400 MG DR capsule, Take 6 capsules (2,400 mg) by mouth 2 times daily, Disp: 360 capsule, Rfl: 3    SOCIAL HISTORY:  Social History     Socioeconomic History     Marital status: Single     Spouse name: Not on file     Number of children: Not on file     Years of education: Not on file     Highest education level: Not on file   Occupational History     Not on file   Social Needs     Financial resource strain: Not on file     Food insecurity     Worry: Not on file     Inability: Not on file     Transportation needs     Medical: Not on file     Non-medical: Not on file   Tobacco Use     Smoking status: Never Smoker     Smokeless tobacco: Never Used   Substance and Sexual Activity     Alcohol use: No     Alcohol/week: 0.0 standard drinks     Comment: None     Drug use: No     Sexual activity: Not on file   Lifestyle     Physical activity     Days per week: Not on file     Minutes per session: Not on file     Stress: Not on file   Relationships     Social connections     Talks on phone: Not on file     Gets together: Not on file     Attends Orthodoxy service: Not on file     Active member of club or organization: Not on file     Attends meetings of clubs or organizations: Not on file     Relationship status: Not on file     Intimate partner violence     Fear of current or ex partner: Not on file     Emotionally abused: Not on file     Physically abused: Not on file     Forced sexual activity: Not on file   Other Topics Concern     Not on file   Social History Narrative     Not on file       FAMILY HISTORY:  Family History   Problem Relation Age of Onset     Hypertension Father      Lupus Paternal  Grandmother      Colon Cancer Paternal Grandfather        Past/family/social history reviewed and no changes          Again, thank you for allowing me to participate in the care of your patient.        Sincerely,        Jasen Watkins MD

## 2021-04-12 NOTE — NURSING NOTE
"Chief Complaint   Patient presents with     Follow Up       Vitals:    04/12/21 1400   Weight: 102.1 kg (225 lb)   Height: 1.93 m (6' 4\")       Body mass index is 27.39 kg/m .    Chlua Jackson CMA    "

## 2021-04-12 NOTE — PROGRESS NOTES
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    1. Colitis  Likely PSC colitis. He remains in clinical remission on combination of humira, mesalamine, and azathioprine (Adalimumab 40mg q14 days started 12/2018. Adalimumab increased to 40mg q7 days summer 2019). During recent 8/3/2020 cecal bx showed severe active colitis (iris grade 4), with left colon random bx showing no active colitis, and 2 mm sigmoid hyperplastic polyp. Despite the severe colitis found in his cecum on bx, he continues to remain clinically in remission.  - continue with PTA mesalamine 4.8g/day, humira 40 mg  q7days, azathioprine 50mg daily  - labs [id number]/year (LFT, CRP, ESR, CBC)  - needs yearly dysplasia colonoscopy, due 8/2021 (ordered)    2. PSC  - continue to follow with Dr. Herrera in liver clinic    3. Reported celiac disease  Per patient diagnosed at [facility], but path unavailable. Celiac serologies normal in [facility] records.  Duodenal biopsies from 8/14/17 normal (presumably on gluten free diet). Will have cramping and diarrhea if he accidentally eats gluten.  - Continue gluten free diet for now    4. Eosinophilic esophagitis  Unclear if he has EoE or reactive changes related to reflux.  Denies any dysphagia; his father has dysphagia history.  Had > 15 eosinophils/hpf on distal esophageal biopsies 12/17/18, but biopsies from mid-esophagus were normal.    - does not take PPI    5. IBD dysplasia surveillance:   Given his pan colitis and his recent cecal bx on 8/3/2020 showing severe active colitis, will need yearly surveillance, despite his clinical remission.  - yearly dysplasia colonoscopy with bx due 8/2021 (ordered)     IBD HISTORY  Age at diagnosis: 23  Extent of disease: Pancolitis   Current UC medications:   - Adalimumab 40mg q14 days (started 12/2018)  - Adalimumab  increased to 40mg q7 days summer 2019  - Azathioprine 175 mg daily: Started 2018, but currently on 50 mg daily   - Lialda 4.8g daily   Prior IBD surgeries: None  Prior IBD Medications:  - Vedolizumab (8/2016 - 8/2017) stopped due to ongoing inflammation on colonoscopy (not clear if drug/Ab levels were checked)  - Infliximab-developed antibodies (8/2018)  - Budesonide  - Asacol      DRUG MONITORING  TPMT enzyme activity: 29.4 (8/20/18)     6-TGN/6-MMPN levels: NA     Biologic concentration:  Infliximab 7/25/18-drug level 0, antibody greater than 200       CRP Inflammation   Date Value Ref Range Status   12/18/2018 <2.9 0.0 - 8.0 mg/L Final     Sed Rate   Date Value Ref Range Status   08/20/2018 8 0 - 15 mm/h Final       Endoscopic assessment: eMayo 3 6/5/19 (unchanged from before)  Enterography: NA  Fecal calprotectin: NA  C diff: NA     IBD Health Care Maintenance:     Vaccinations:  All patients on biologics should avoid live vaccines.    -- Influenza (every year)  -- TdaP (every 10 years)  -- Pneumococcal Pneumonia (once plus booster at 5 years)  -- Prevnar 13  -- Yearly assessment for latent Tb (verbal screening and exam, PPD or QuantiFERON-Tb testing)     One time confirmation of immunity or serologies:  -- Hepatitis A (serologies or immunizations)  -- Hepatitis B (serologies or immunizations)  -- Varicella  -- MMR  -- HPV (all aged 18-26)  -- Meningococcal meningitis (all patients at risk for meningitis)     Bone mineral density screening   -- Recommend all patients supplement with calcium and vitamin D     Cancer Screening:  Colon cancer screening:  Given PSC colitis, recommend patient undergo regular yearly dysplasia surveillance.     Skin cancer screening: Annual visual exam of skin by dermatologist since patient is immunocompromised     Misc:  -- Avoid tobacco use  -- Avoid NSAIDs as there is potentially a 25% chance of causing an IBD flare    Return to clinic in 6 months    Thank you for this consultation.   It was a pleasure to participate in the care of this patient; please contact us with any further questions.  I spent a total of 20 minutes, face to face, was spent with this patient, >50% of which was counseling regarding the above delineated issues.    This patient was staffed and discussed with Dr. Watkins.    Nannette Galloway MD  PGY2  IM resident  [phone]      Jasen Watkins MD    [facility]  Inflammatory Bowel Disease Program  Division of Gastroenterology, Hepatology and Nutrition    HPI:     Patient is a 30 year old male, with h/o PSC pan-colitis (dx age 23) on azithioprine, humira, and lialda), presenting for f/u appt.    Patient was last seen on 2020, was in clinical remission on humira q7 days, azathioprine 50 mg daily, mesalamine.     Last colonoscopy in 8/3/2020 with cecal bx showing severe active colitis, iris grade 4. Colon, left bx showed no active colitis. 2mm sigmoid hyperplastic polyp.    For the past year, has not noticed any flares. Has been taking humira q7 days for the past 1.5 years. Continues to be on gluten free diet for his celiac disease, which he finds very helpful. Has normal BM 1-3x/day. Denied bloody stools, fevers, chills, abdminal pain. Denied joint pains, eyes issues. Have not had any issues with dysphagia.    Woody score:   Stool freq: 2 (3-4 stools/day more than normal)  Rectal bleedin (None)  PGA: 0 (normal)  Endoscopy: 3 (Severe)    Extra intestinal manifestations of IBD:  No uveitis/episcleritis  No aphthous ulcers   No arthritis   No erythema nodosum/pyoderma gangrenosum.     sIBDQ:  IBDQ Score Date IBDQ - Total Score  (Higher score better)   2019 65   2018 64   2018 65        Last histologic activity:  8/3/2020 cecal bx on colonoscopy showed severe active colitis. Left colon bx showed no active colitis      ROS:    Constitutional, HEENT, cardiovascular, pulmonary, GI, , musculoskeletal, neuro, skin, endocrine and  "psych systems are negative, except as otherwise noted.    PHYSICAL EXAMINATION:  Constitutional: aaox3, cooperative, pleasant, not dyspneic/diaphoretic, no acute distress  Vitals reviewed: Ht 1.93 m (6' 4\")   Wt 102.1 kg (225 lb)   BMI 27.39 kg/m    Wt:   Wt Readings from Last 2 Encounters:   04/12/21 102.1 kg (225 lb)   06/11/19 91.1 kg (200 lb 12.8 oz)      Eyes: Sclera anicteric/injected  Respiratory: Unlabored breathing  Skin: no jaundice  Psych: Normal affect    PERTINENT PAST MEDICAL HISTORY:  Past Medical History:   Diagnosis Date     Celiac disease      PSC (primary sclerosing cholangitis)      Ulcerative colitis (H)        PREVIOUS SURGERIES:  Past Surgical History:   Procedure Laterality Date     COLONOSCOPY N/A 6/5/2019    Procedure: COLONOSCOPY, WITH BIOPSY;  Surgeon: Jasen Watkins MD;  [facility]       ALLERGIES:     Allergies   Allergen Reactions     Fish-Derived Products Hives     Positive skin test to fish and shellfish on 9/14/2016.     Shellfish Allergy Hives, Itching and Shortness Of Breath     Gluten Meal        PERTINENT MEDICATIONS:    Current Outpatient Medications:      adalimumab (HUMIRA *CF*) 40 MG/0.4ML pen kit, Inject 0.4 mLs (40 mg) Subcutaneous every 7 days, Disp: 4 each, Rfl: 5     azaTHIOprine (IMURAN) 50 MG tablet, Take 3.5 tablets (175 mg) by mouth daily, Disp: 315 tablet, Rfl: 0     azaTHIOprine (IMURAN) 50 MG tablet, Take 3.5 tablets (175 mg) by mouth daily, Disp: 105 tablet, Rfl: 5     azaTHIOprine (IMURAN) 50 MG tablet, TAKE 3 AND ONE-HALF TABLETS(175 MG) BY MOUTH DAILY, Disp: 105 tablet, Rfl: 5     DELZICOL 400 MG DR capsule, TAKE 6 CAPSULES(2400 MG) BY MOUTH TWICE DAILY, Disp: 360 capsule, Rfl: 1     EPINEPHrine (EPIPEN 2-NAGI) 0.3 MG/0.3ML injection 2-pack, Inject when needed for emergency treatment of severe allergic reactions (anaphylaxis)., Disp: , Rfl:      mesalamine (DELZICOL) 400 MG DR capsule, Take 6 capsules (2,400 mg) by mouth 2 times daily, Disp: 360 " capsule, Rfl: 3    SOCIAL HISTORY:  Social History     Socioeconomic History     Marital status: Single     Spouse name: Not on file     Number of children: Not on file     Years of education: Not on file     Highest education level: Not on file   Occupational History     Not on file   Social Needs     Financial resource strain: Not on file     Food insecurity     Worry: Not on file     Inability: Not on file     Transportation needs     Medical: Not on file     Non-medical: Not on file   Tobacco Use     Smoking status: Never Smoker     Smokeless tobacco: Never Used   Substance and Sexual Activity     Alcohol use: No     Alcohol/week: 0.0 standard drinks     Comment: None     Drug use: No     Sexual activity: Not on file   Lifestyle     Physical activity     Days per week: Not on file     Minutes per session: Not on file     Stress: Not on file   Relationships     Social connections     Talks on phone: Not on file     Gets together: Not on file     Attends Church service: Not on file     Active member of club or organization: Not on file     Attends meetings of clubs or organizations: Not on file     Relationship status: Not on file     Intimate partner violence     Fear of current or ex partner: Not on file     Emotionally abused: Not on file     Physically abused: Not on file     Forced sexual activity: Not on file   Other Topics Concern     Not on file   Social History Narrative     Not on file       FAMILY HISTORY:  Family History   Problem Relation Age of Onset     Hypertension Father      Lupus Paternal Grandmother      Colon Cancer Paternal Grandfather        Past/family/social history reviewed and no changes

## 2021-04-17 ENCOUNTER — HEALTH MAINTENANCE LETTER (OUTPATIENT)
Age: 31
End: 2021-04-17

## 2021-06-04 ENCOUNTER — TELEPHONE (OUTPATIENT)
Dept: GASTROENTEROLOGY | Facility: CLINIC | Age: 31
End: 2021-06-04

## 2021-06-04 NOTE — TELEPHONE ENCOUNTER
Diagnosed with COVID today he states that he started to have symptoms of a cold last Friday but the last three days he progressively has gotten worse runny nose and cough. He states he is very fatigued. He does not have fever or shortness of breath.     Spoke to Dr Watkins, patient will hold his Humira (due on Sunday) and the Azathioprine and will meet with the pharmacist Jordana Khan on 06/08/2021 to discuss next steps.        Patient was reminded to adhere to strict quarantine.  Patient was also reminded to to call the fellow on call of his symptoms worsened over the weekend.  He was given the number on call. Patient verbalized understanding

## 2021-06-04 NOTE — TELEPHONE ENCOUNTER
[facility] Call Center    Phone Message    May a detailed message be left on voicemail: yes     Reason for Call: Other: Pt was diagnosed with COVID-19 and wanted to know if he should stop using his injections while dealing with the symptoms. Pt also wanted to know if he could get infusions that will help with the COVID-19 symptoms. Please give the pt a call back. Thank you.      Action Taken: Message routed to:  [facility] ([facility]    Travel Screening: Not Applicable

## 2021-06-08 ENCOUNTER — VIRTUAL VISIT (OUTPATIENT)
Dept: PHARMACY | Facility: CLINIC | Age: 31
End: 2021-06-08
Payer: COMMERCIAL

## 2021-06-08 DIAGNOSIS — U07.1 CLINICAL DIAGNOSIS OF COVID-19: ICD-10-CM

## 2021-06-08 DIAGNOSIS — K51.018 ULCERATIVE PANCOLITIS WITH OTHER COMPLICATION (H): Primary | ICD-10-CM

## 2021-06-08 PROCEDURE — 99605 MTMS BY PHARM NP 15 MIN: CPT | Performed by: PHARMACIST

## 2021-06-08 PROCEDURE — 99607 MTMS BY PHARM ADDL 15 MIN: CPT | Performed by: PHARMACIST

## 2021-06-08 NOTE — PROGRESS NOTES
Medication Therapy Management (MTM) Encounter    ASSESSMENT:                            Medication Adherence/Access: No issues identified    COVID positive: Uriah would benefit from continuing to hold adalimumab and azathioprine given AGA guidelines for COVID positive patients on immunosuppression to ensure his symptoms are improving. We will plan to connect on Monday next week (14 days after initial symptoms) to discuss re-starting his medications if his symptoms continue to resolve. We discussed checking anti-bodies to COVID-Guillermo to help determine if he is in the convalescent phase of infection, but due to logistics and mild symptoms he states that he would prefer not to do this for now. He will contact our clinic if he changes his mind. He would likely benefit from a health maintenance review, which we can do when he is feeling better.    Ulcerative Pancolitis: Stable based on report. He is aware that he should contact our clinic in the event that his symptoms return while holding therapy.    PLAN:                            1. Uriah to continue to hold Humira and azathioprine until 6/14. Please contact myself or the GI clinic if your IBD symptoms return.    Follow-up: on Monday via phone/[employer] regarding symptom status   -- consider health maintenance review when he is feeling better    SUBJECTIVE/OBJECTIVE:                          Uriah Mortensen is a 31 year old male contacted via secure video for an initial visit. He was referred to me from Brandie Oswald RNCC.      Reason for visit: COVID-19 positive on immunosuppression    Allergies/ADRs: Reviewed in chart  Tobacco: He reports that he has never smoked. He has never used smokeless tobacco.  Alcohol: not currently using    Medication Adherence/Access: no issues reported    COVID Positive:     First symptoms: 5/30 or 5/31 (cough) then fatigue by 6/1  Positive test: Friday 6/4 per his report    Went in last Friday - had a cough and some fatigue which started  around 5/30 or 5/31. Tested positive for COVID-19. Fatigue is better as well as cough. No fever so far. Some appetite suppression, but normal now. He has not lost his taste or smell, unsure if somewhat effective. His symptoms did get worse before they got better but he is doing okay now.     Reviewed potential prolonging of viral shedding period due to immunosuppression as well as hygiene recommendations and isolation period.    Ulcerative Pancolitis:   Humira 40 mg weekly  Delzicol 2.4 g twice daily   Azathioprine 150 mg daily     Reports he usually takes Sunday night Humira - last dose was 5/30. He also takes 3 tablets azathioprine daily, but this is also on hold (last dose Thursday). No noticeable symptoms in terms of his IBD. Last visit with Dr. Watkins on 4/12/2021 with notes that he continues to remain in clinical remission.    ----------------    I spent 18 minutes with this patient today. I offer these suggestions for consideration by his care team. A copy of the visit note was provided to the patient's GI provider.    The patient was sent via "[employer] a summary of these recommendations.     Jordana SandovalD, BCACP  MTM Pharmacist   [facility]  Phone: [phone]    Telemedicine Visit Details  Type of service:  Video Conference via [employer]  Start Time: 9:32 AM  End Time: 9:50 AM  Originating Location (patient location): Home  Distant Location (provider location):  [facility]      Medication Therapy Recommendations  Clinical diagnosis of COVID-19    Current Medication: adalimumab (HUMIRA *CF*) 40 MG/0.4ML pen kit   Rationale: Unsafe medication for the patient - Adverse medication event - Safety   Recommendation: Provide Education   Status: Patient Agreed - Adherence/Education   Note: hold medication given infection until 6/14

## 2021-06-10 NOTE — PATIENT INSTRUCTIONS
Recommendations from today's MTM visit:                                                    MTM (medication therapy management) is a service provided by a clinical pharmacist designed to help you get the most of out of your medicines.   Today we reviewed what your medicines are for, how to know if they are working, that your medicines are safe and how to make your medicine regimen as easy as possible.      1. Continue to hold Humira/azathioprine until 6/14 to ensure your symptoms continue to resolve. We can connect at that point to discuss re-starting.    It looks like you are a due for a health maintenance review in the setting of immunosuppression which we can do when you are feeling better. Please let us know if your IBD symptoms return while you are holding your therapy.    Follow-up: plan to connect on 6/14 to discuss symptoms and potential re-start of your Humira and azathioprine    It was great to speak with you today.  I value your experience and would be very thankful for your time with providing feedback on our clinic survey. You may receive a survey via email or text message in the next few days.     To schedule another MTM appointment, please call the clinic directly or you may call the MTM scheduling line at [phone] or toll-free at [phone].     My Clinical Pharmacist's contact information:                                                      Please feel free to contact me with any questions or concerns you have.      Jordana SandovalD, BCACP  MTM Pharmacist   [facility] Rhe[facility]  Phone: [phone]

## 2021-06-15 ENCOUNTER — DOCUMENTATION ONLY (OUTPATIENT)
Dept: GASTROENTEROLOGY | Facility: CLINIC | Age: 31
End: 2021-06-15

## 2021-06-15 ENCOUNTER — PATIENT OUTREACH (OUTPATIENT)
Dept: GASTROENTEROLOGY | Facility: CLINIC | Age: 31
End: 2021-06-15

## 2021-06-15 DIAGNOSIS — K83.01 PSC (PRIMARY SCLEROSING CHOLANGITIS) (H): ICD-10-CM

## 2021-06-15 DIAGNOSIS — K51.90 ULCERATIVE COLITIS (H): Primary | ICD-10-CM

## 2021-06-15 NOTE — PROGRESS NOTES
Per request:    Referral/order for colonoscopy faxed to Dr. Sudhakar Parra at    [facility]  [id number] [facility] [place] [address]  Phone: [phone]  Fax: [phone]    Raz Posadas LPN

## 2021-07-24 ENCOUNTER — TELEPHONE (OUTPATIENT)
Dept: GASTROENTEROLOGY | Facility: CLINIC | Age: 31
End: 2021-07-24

## 2021-08-09 ENCOUNTER — TRANSFERRED RECORDS (OUTPATIENT)
Dept: HEALTH INFORMATION MANAGEMENT | Facility: CLINIC | Age: 31
End: 2021-08-09

## 2021-08-15 DIAGNOSIS — K51.90 ULCERATIVE COLITIS (H): ICD-10-CM

## 2021-08-16 DIAGNOSIS — K51.90 ULCERATIVE COLITIS (H): ICD-10-CM

## 2021-08-18 RX ORDER — MESALAMINE 400 MG/1
2400 CAPSULE, DELAYED RELEASE ORAL 2 TIMES DAILY
Qty: 360 CAPSULE | Refills: 4 | Status: SHIPPED | OUTPATIENT
Start: 2021-08-18 | End: 2022-01-05

## 2021-08-18 NOTE — TELEPHONE ENCOUNTER
Last Clinic Visit: 4/12/2021 [facility] [place]  Future Visit: 1/11/2022

## 2021-08-19 RX ORDER — MESALAMINE 400 MG/1
CAPSULE, DELAYED RELEASE ORAL
Qty: 360 CAPSULE | Refills: 3 | OUTPATIENT
Start: 2021-08-19

## 2021-08-19 NOTE — TELEPHONE ENCOUNTER
MESALAMINE 400MG DR CAPSULES  mesalamine (DELZICOL) 400 MG DR capsule 360 capsule 4 8/18/2021  No   Sig - Route: Take 6 capsules (2,400 mg) by mouth 2 times daily - Oral   Sent to pharmacy as: Mesalamine 400 MG Oral Capsule Delayed Release (DELZICOL)   Class: E-Prescribe   Order: [id number]   E-Prescribing Status: Receipt confirmed by pharmacy (8/18/2021 10:39 AM CDT)   Printout Tracking    External Result Report   Pharmacy    [id number] - [place], [place] - [address] AT [facility] [address]   Associated Diagnoses    Ulcerative colitis (H) [K51.90]

## 2021-09-10 DIAGNOSIS — K52.9 IBD (INFLAMMATORY BOWEL DISEASE): ICD-10-CM

## 2021-09-13 NOTE — TELEPHONE ENCOUNTER
adalimumab (HUMIRA *CF*) 40 MG/0.4ML pen kit   Inject 0.4 mLs (40 mg) Subcutaneous every 7 days      Last Written Prescription Date:  2/9/21  Last Fill Quantity: 4 ea,   # refills: 5  Last Office Visit : 4/12/21  Return to clinic in 6 months  Future Office visit:  1/11/22    Routing refill request to provider for review/approval because:    Overdue visit. . Results for CBC/LFT's > 3 months. Outside labs scanned on 3/4/21 (draw 3/2/21)  90 day pended.  FYI - Future lab orders in Epic que.

## 2021-09-24 ENCOUNTER — PATIENT OUTREACH (OUTPATIENT)
Dept: GASTROENTEROLOGY | Facility: CLINIC | Age: 31
End: 2021-09-24

## 2021-09-24 DIAGNOSIS — K52.9 IBD (INFLAMMATORY BOWEL DISEASE): ICD-10-CM

## 2021-10-02 ENCOUNTER — HEALTH MAINTENANCE LETTER (OUTPATIENT)
Age: 31
End: 2021-10-02

## 2021-10-21 DIAGNOSIS — K51.00 ULCERATIVE PANCOLITIS WITHOUT COMPLICATION (H): ICD-10-CM

## 2021-10-24 NOTE — TELEPHONE ENCOUNTER
AZATHIOPRINE 50MG TABLETS      Last Written Prescription Date:  4/4/21  Last Fill Quantity: 315,   # refills: 0  Last Office Visit : 4/12/21  Future Office visit:  1/11/22    Routing refill request to provider for review/approval because:  No CBC nor LFT's within last 3 months.  Last visit >6 months ago  Patient reports taking 150 mg daily rather than 175 mg daily

## 2021-12-13 ENCOUNTER — TELEPHONE (OUTPATIENT)
Dept: GASTROENTEROLOGY | Facility: CLINIC | Age: 31
End: 2021-12-13
Payer: COMMERCIAL

## 2021-12-13 NOTE — TELEPHONE ENCOUNTER
lvm to RESCHEDULE 1/11/22 with DR. GARNER, left call center phone number and sent [facility]

## 2021-12-30 ENCOUNTER — TELEPHONE (OUTPATIENT)
Dept: GASTROENTEROLOGY | Facility: CLINIC | Age: 31
End: 2021-12-30
Payer: COMMERCIAL

## 2021-12-30 NOTE — TELEPHONE ENCOUNTER
Called to remind patient of their upcoming appointment on 1/5/22 at 7AM with Ronak Bergman. This appointment is scheduled as a video visit. You will receive a call approximately 30 minutes prior to check you in, you must be in [place] for this visit., if your appointment is virtual (video or telephone) you need to be in [place] for the visit. To reschedule or cancel patient to call [phone].    Called and LM, sent [employer] message.    Saranya Lambert MA

## 2022-01-05 ENCOUNTER — VIRTUAL VISIT (OUTPATIENT)
Dept: GASTROENTEROLOGY | Facility: CLINIC | Age: 32
End: 2022-01-05
Payer: COMMERCIAL

## 2022-01-05 DIAGNOSIS — K20.0 EOSINOPHILIC ESOPHAGITIS: ICD-10-CM

## 2022-01-05 DIAGNOSIS — K83.01 PSC (PRIMARY SCLEROSING CHOLANGITIS) (H): ICD-10-CM

## 2022-01-05 DIAGNOSIS — K90.0 CELIAC DISEASE: ICD-10-CM

## 2022-01-05 DIAGNOSIS — K51.00 ULCERATIVE PANCOLITIS WITHOUT COMPLICATION (H): Primary | ICD-10-CM

## 2022-01-05 PROCEDURE — 99214 OFFICE O/P EST MOD 30 MIN: CPT | Mod: GT | Performed by: PHYSICIAN ASSISTANT

## 2022-01-05 RX ORDER — AZATHIOPRINE 50 MG/1
175 TABLET ORAL DAILY
Qty: 315 TABLET | Refills: 1 | Status: SHIPPED | OUTPATIENT
Start: 2022-01-05 | End: 2022-08-22

## 2022-01-05 RX ORDER — MESALAMINE 1.2 G/1
4800 TABLET, DELAYED RELEASE ORAL DAILY
Qty: 360 TABLET | Refills: 1 | Status: SHIPPED | OUTPATIENT
Start: 2022-01-05 | End: 2022-04-05

## 2022-01-05 NOTE — PATIENT INSTRUCTIONS
It was a pleasure taking care of you today.  I've included a brief summary of our discussion and care plan from today's visit below.  Please review this information with your primary care provider.  ______________________________________________________________________    My recommendations are summarized as follows:    -- Continue humira every 7 days, switch to lialda 4 pills per day once per day, azathioprine 175 mg once per day.  -- Labs when able   -- Next endoscopic assessment: colonoscopy to be done summer 2022  -- Patient with IBD we recommend supplementation vitamin D 1000 units daily and calcium 500 mg twice daily.  -- Vaccines/immunizations to be updated: Recommend yearly flu shot, pneumonia vaccines (Prevnar 13 then 8 weeks later Pneumovax 23 then 5 years later Pneumovax 23), tetanus every 10 years.  -- Yearly Dermatology visit for skin check while on immunosuppressive therapy. Can call [phone] to schedule.  -- No NSAIDs (ibuprofen, or anything containing ibuprofen)       For additional resources about inflammatory bowel disease visit [email or web address]      Return to GI Clinic in 6 months to review your progress.    ______________________________________________________________________    How do I schedule labs, imaging studies, or procedures that were ordered in clinic today?     Labs: To schedule lab appointment at the [facility], use my chart or call [phone]. If you have a [place] lab closer to home where you are regularly seen you can give them a call.     Procedures: If a colonoscopy, upper endoscopy, breath test, esophageal manometry, or pH impedence was ordered today, our endoscopy team will call you to schedule this. If you have not heard from our endoscopy team within a week, please call [phone] to schedule.     Imaging Studies: If you were scheduled for a CT scan, X-ray, MRI, ultrasound, HIDA scan or other imaging study, please call  [phone] to have this scheduled.     Referral: If a referral to another specialty was ordered, expect a phone call or follow instructions above. If you have not heard from anyone regarding your referral in a week, please call our clinic to check the status.     Who do I call with any questions after my visit?  Please be in touch if there are any further questions that arise following today's visit.  There are multiple ways to contact your gastroenterology care team.        During business hours, you may reach a Gastroenterology nurse at [phone]      To schedule or reschedule an appointment, please call [phone].       You can always send a secure message through [employer].  [employer] messages are answered by your nurse or doctor typically within 24 hours.  Please allow extra time on weekends and holidays.        For urgent/emergent questions after business hours, you may reach the on-call GI Fellow by contacting the [facility]  at [phone].     How will I get the results of any tests ordered?    You will receive all of your results.  If you have signed up for [employer], any tests ordered at your visit will be available to you after your physician reviews them.  Typically this takes 1-2 weeks.  If there are urgent results that require a change in your care plan, your physician or nurse will call you to discuss the next steps.      What is [employer]?  [employer] is a secure way for you to access all of your healthcare records from the [facility].  It is a web based computer program, so you can sign on to it from any location.  It also allows you to send secure messages to your care team.  I recommend signing up for [employer] access if you have not already done so and are comfortable with using a computer.         Sincerely,    Ronak Bergman PA-C  [facility]  Division of Gastroenterology

## 2022-01-05 NOTE — LETTER
"    1/5/2022         RE: Uriah Mortensen  [address] [place] [address]        Dear Colleague,    Thank you for referring your patient, Uriah Mortensen, to the [facility] [place]. Please see a copy of my visit note below.      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    1. Colitis  Current medication: adalimumab weekly (started adalimumab 12/2018, increased to weekly summer 2019), aza 175 mg daily, mesalamine   Current clinical disease activity: remission   Current endoscopic disease activity: 8/9/21 (done in [place]) normal TI, isolated areas of mucosal inflammation from cecum to mid ascending colon. Sigmoid bx read \"chronic active inflammation\" without grade.    Likely PSC colitis. He remains in clinical remission on combination of humira, mesalamine, and azathioprine.  Despite findings of inflammation at last colonoscopy, he continues to feel well.  - continue with mesalamine 4.8g/day (changed to lialda due to coverage), humira 40 mg  q7days, azathioprine 175 mg daily. All were re-prescribed today.  - labs [id number]/year (LFT, CRP, ESR, CBC) + celiac panel. Labs were ordered and requested to be coordinated at local lab.  - needs yearly dysplasia colonoscopy, due 8/2022 (ordered, and recommended to be done here at [facility] this year)    2. PSC  - continue to follow with Dr. Herrera in liver clinic    3. Reported celiac disease  EGD 2015 at [place] does state mucosal changes seen consistent with celiac, however path unavailable. EGD 2018 normal duodenal bxs on gluten free diet.  Celiac serologies normal in [facility] records.  Will have cramping and diarrhea if he accidentally eats gluten.  - Continue gluten free diet  - celiac serologies ordered for today's labs    4. Eosinophilic esophagitis  No " dysphagia at today's visit.  Unclear if he has EoE or reactive changes related to reflux.  Denies any dysphagia; his father has dysphagia history.  Had > 15 eosinophils/hpf on distal esophageal biopsies 12/17/18, but biopsies from mid-esophagus were normal.    - does not take PPI    5. IBD dysplasia surveillance:   Given his pan colitis and his recent cecal bx on 8/3/2020 showing severe active colitis, will need yearly surveillance, despite his clinical remission.  - yearly dysplasia colonoscopy with bx due 8/2022 (ordered)     IBD HISTORY  Age at diagnosis: 23  Extent of disease: Pancolitis   Prior IBD surgeries: None  Prior IBD Medications:  - Vedolizumab (8/2016 - 8/2017) stopped due to ongoing inflammation on colonoscopy (not clear if drug/Ab levels were checked)  - Infliximab-developed antibodies (8/2018)  - Budesonide  - Asacol      DRUG MONITORING  TPMT enzyme activity: 29.4 (8/20/18)     6-TGN/6-MMPN levels: NA     Biologic concentration:  Infliximab 7/25/18-drug level 0, antibody greater than 200       CRP Inflammation   Date Value Ref Range Status   12/18/2018 <2.9 0.0 - 8.0 mg/L Final     Sed Rate   Date Value Ref Range Status   08/20/2018 8 0 - 15 mm/h Final     Enterography: NA  Fecal calprotectin: NA  C diff: NA     IBD Health Care Maintenance:     Vaccinations:  All patients on biologics should avoid live vaccines.    -- Influenza (every year)  -- TdaP (every 10 years)  -- Pneumococcal Pneumonia (once plus booster at 5 years)  -- Prevnar 13  -- Yearly assessment for latent Tb (verbal screening and exam, PPD or QuantiFERON-Tb testing)     One time confirmation of immunity or serologies:  -- Hepatitis A (serologies or immunizations)  -- Hepatitis B (serologies or immunizations)  -- Varicella  -- MMR  -- HPV (all aged 18-26)  -- Meningococcal meningitis (all patients at risk for meningitis)     Bone mineral density screening   -- Recommend all patients supplement with calcium and vitamin D     Cancer  Screening:  Colon cancer screening:  Given PSC colitis, recommend patient undergo regular yearly dysplasia surveillance.     Skin cancer screening: Annual visual exam of skin by dermatologist since patient is immunocompromised     Misc:  -- Avoid tobacco use  -- Avoid NSAIDs as there is potentially a 25% chance of causing an IBD flare    Return to clinic in 6 months    Thank you for this consultation.  It was a pleasure to participate in the care of this patient; please contact us with any further questions.  I spent a total of 34 minutes, face to face, was spent with this patient, >50% of which was counseling regarding the above delineated issues.      Ronak Bergman PA-C  Division of Gastroenterology, Hepatology and Nutrition  [facility]       HPI:   Patient is a 30 year old male, with h/o PSC pan-colitis (dx age 23) on azithioprine (prescribed as 175mg, but self decreased due to overdue for med refill), humira every 7 days, and delzicol 2400mg BID), presenting for f/u appt.    Patient was last seen on 2021, was in clinical remission.    Last colonoscopy in 2021 showing patchy inflammation from cecum to mid ascending with chronic active inflammation noted.    Currently 2 formed stools per day, formed and no blood.  Extremely sensitive to any cross contamination of gluten and will have abdominal pain, fatigue and loose stools. He is compliant with gluten free diet.  No abdominal pain.  No EIM at this time.    No upper GI sxs specifically no dysphagia, nausea or vomiting.     Woody score:   Stool freq: 0 (baseline stools frequency)  Rectal bleedin (None)  PGA: 0 (normal)  Endoscopy: Not done    Remission: <3   Mild disease: 3-5  Moderate disease: 6-10  Severe disease: >10    sIBDQ:  IBDQ Score Date IBDQ - Total Score  (Higher score better)   2022 63   2019 65   2018 64   2018 65      ROS:    Constitutional, HEENT, cardiovascular, pulmonary, GI, , musculoskeletal, neuro,  skin, endocrine and psych systems are negative, except as otherwise noted.    PHYSICAL EXAMINATION:  Constitutional: aaox3, cooperative, pleasant, not dyspneic/diaphoretic, no acute distress  Vitals reviewed: There were no vitals taken for this visit.  Wt:   Wt Readings from Last 2 Encounters:   04/12/21 102.1 kg (225 lb)   06/11/19 91.1 kg (200 lb 12.8 oz)      Eyes: Sclera anicteric/injected  Respiratory: Unlabored breathing  Skin: no jaundice  Psych: Normal affect    PERTINENT PAST MEDICAL HISTORY:  Past Medical History:   Diagnosis Date     Celiac disease      PSC (primary sclerosing cholangitis)      Ulcerative colitis (H)        PREVIOUS SURGERIES:  Past Surgical History:   Procedure Laterality Date     COLONOSCOPY N/A 6/5/2019    Procedure: COLONOSCOPY, WITH BIOPSY;  Surgeon: Jasen Watkins MD;  [facility]       ALLERGIES:     Allergies   Allergen Reactions     Fish-Derived Products Hives     Positive skin test to fish and shellfish on 9/14/2016.     Shellfish Allergy Hives, Itching and Shortness Of Breath     Gluten Meal        PERTINENT MEDICATIONS:    Current Outpatient Medications:      adalimumab (HUMIRA *CF*) 40 MG/0.4ML pen kit, Inject 0.4 mLs (40 mg) Subcutaneous every 7 days, Disp: 4 each, Rfl: 5     azaTHIOprine (IMURAN) 50 MG tablet, Take 3.5 tablets (175 mg) by mouth daily (Patient taking differently: Take 150 mg by mouth daily ), Disp: 315 tablet, Rfl: 0     EPINEPHrine (EPIPEN 2-NAGI) 0.3 MG/0.3ML injection 2-pack, Inject when needed for emergency treatment of severe allergic reactions (anaphylaxis)., Disp: , Rfl:      mesalamine (DELZICOL) 400 MG DR capsule, Take 6 capsules (2,400 mg) by mouth 2 times daily, Disp: 360 capsule, Rfl: 4    SOCIAL HISTORY:  Social History     Socioeconomic History     Marital status: Single     Spouse name: Not on file     Number of children: Not on file     Years of education: Not on file     Highest education level: Not on file   Occupational History      Not on file   Tobacco Use     Smoking status: Never Smoker     Smokeless tobacco: Never Used   Substance and Sexual Activity     Alcohol use: No     Alcohol/week: 0.0 standard drinks     Comment: None     Drug use: No     Sexual activity: Not on file   Other Topics Concern     Not on file   Social History Narrative     Not on file     Social Determinants of Health     Financial Resource Strain: Not on file   Food Insecurity: Not on file   Transportation Needs: Not on file   Physical Activity: Not on file   Stress: Not on file   Social Connections: Not on file   Intimate Partner Violence: Not on file   Housing Stability: Not on file       FAMILY HISTORY:  Family History   Problem Relation Age of Onset     Hypertension Father      Lupus Paternal Grandmother      Colon Cancer Paternal Grandfather        Past/family/social history reviewed and no changes      Again, thank you for allowing me to participate in the care of your patient.        Sincerely,        Ronak Bergman PA-C

## 2022-01-05 NOTE — PROGRESS NOTES
"Uriah is a 31 year old who is being evaluated via a billable video visit.      How would you like to obtain your AVS? MyChart  If the video visit is dropped, the invitation should be resent by: Send to e-mail at: [email or web address]  Will anyone else be joining your video visit? No      Video Start Time: 7:07 AM  Video-Visit Details    Type of service:  Video Visit    Video End Time:7:30 AM    Originating Location (pt. 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    1. Colitis  Current medication: adalimumab weekly (started adalimumab 12/2018, increased to weekly summer 2019), aza 175 mg daily, mesalamine   Current clinical disease activity: remission   Current endoscopic disease activity: 8/9/21 (done in [place]) normal TI, isolated areas of mucosal inflammation from cecum to mid ascending colon. Sigmoid bx read \"chronic active inflammation\" without grade.    Likely PSC colitis. He remains in clinical remission on combination of humira, mesalamine, and azathioprine.  Despite findings of inflammation at last colonoscopy, he continues to feel well.  - continue with mesalamine 4.8g/day (changed to lialda due to coverage), humira 40 mg  q7days, azathioprine 175 mg daily. All were re-prescribed today.  - labs [id number]/year (LFT, CRP, ESR, CBC) + celiac panel. Labs were ordered and requested to be coordinated at local lab.  - needs yearly dysplasia colonoscopy, due 8/2022 (ordered, and recommended to be done here at [facility] this year)    2. PSC  - continue to follow with Dr. Herrera in liver clinic    3. Reported celiac disease  EGD 2015 at [place] does state mucosal changes seen consistent with " celiac, however path unavailable. EGD 2018 normal duodenal bxs on gluten free diet.  Celiac serologies normal in [facility] records.  Will have cramping and diarrhea if he accidentally eats gluten.  - Continue gluten free diet  - celiac serologies ordered for today's labs    4. Eosinophilic esophagitis  No dysphagia at today's visit.  Unclear if he has EoE or reactive changes related to reflux.  Denies any dysphagia; his father has dysphagia history.  Had > 15 eosinophils/hpf on distal esophageal biopsies 12/17/18, but biopsies from mid-esophagus were normal.    - does not take PPI    5. IBD dysplasia surveillance:   Given his pan colitis and his recent cecal bx on 8/3/2020 showing severe active colitis, will need yearly surveillance, despite his clinical remission.  - yearly dysplasia colonoscopy with bx due 8/2022 (ordered)     IBD HISTORY  Age at diagnosis: 23  Extent of disease: Pancolitis   Prior IBD surgeries: None  Prior IBD Medications:  - Vedolizumab (8/2016 - 8/2017) stopped due to ongoing inflammation on colonoscopy (not clear if drug/Ab levels were checked)  - Infliximab-developed antibodies (8/2018)  - Budesonide  - Asacol      DRUG MONITORING  TPMT enzyme activity: 29.4 (8/20/18)     6-TGN/6-MMPN levels: NA     Biologic concentration:  Infliximab 7/25/18-drug level 0, antibody greater than 200       CRP Inflammation   Date Value Ref Range Status   12/18/2018 <2.9 0.0 - 8.0 mg/L Final     Sed Rate   Date Value Ref Range Status   08/20/2018 8 0 - 15 mm/h Final     Enterography: NA  Fecal calprotectin: NA  C diff: NA     IBD Health Care Maintenance:     Vaccinations:  All patients on biologics should avoid live vaccines.    -- Influenza (every year)  -- TdaP (every 10 years)  -- Pneumococcal Pneumonia (once plus booster at 5 years)  -- Prevnar 13  -- Yearly assessment for latent Tb (verbal screening and exam, PPD or QuantiFERON-Tb testing)     One time confirmation of immunity or serologies:  -- Hepatitis A  (serologies or immunizations)  -- Hepatitis B (serologies or immunizations)  -- Varicella  -- MMR  -- HPV (all aged 18-26)  -- Meningococcal meningitis (all patients at risk for meningitis)     Bone mineral density screening   -- Recommend all patients supplement with calcium and vitamin D     Cancer Screening:  Colon cancer screening:  Given PSC colitis, recommend patient undergo regular yearly dysplasia surveillance.     Skin cancer screening: Annual visual exam of skin by dermatologist since patient is immunocompromised     Misc:  -- Avoid tobacco use  -- Avoid NSAIDs as there is potentially a 25% chance of causing an IBD flare    Return to clinic in 6 months    Thank you for this consultation.  It was a pleasure to participate in the care of this patient; please contact us with any further questions.  I spent a total of 34 minutes, face to face, was spent with this patient, >50% of which was counseling regarding the above delineated issues.      Ronak Bergman PA-C  Division of Gastroenterology, Hepatology and Nutrition  [facility]       HPI:   Patient is a 30 year old male, with h/o PSC pan-colitis (dx age 23) on azithioprine (prescribed as 175mg, but self decreased due to overdue for med refill), humira every 7 days, and delzicol 2400mg BID), presenting for f/u appt.    Patient was last seen on 2021, was in clinical remission.    Last colonoscopy in 2021 showing patchy inflammation from cecum to mid ascending with chronic active inflammation noted.    Currently 2 formed stools per day, formed and no blood.  Extremely sensitive to any cross contamination of gluten and will have abdominal pain, fatigue and loose stools. He is compliant with gluten free diet.  No abdominal pain.  No EIM at this time.    No upper GI sxs specifically no dysphagia, nausea or vomiting.     Woody score:   Stool freq: 0 (baseline stools frequency)  Rectal bleedin (None)  PGA: 0 (normal)  Endoscopy: Not  done    Remission: <3   Mild disease: 3-5  Moderate disease: 6-10  Severe disease: >10    sIBDQ:  IBDQ Score Date IBDQ - Total Score  (Higher score better)   1/5/2022 63   6/9/2019 65   12/17/2018 64   8/18/2018 65      ROS:    Constitutional, HEENT, cardiovascular, pulmonary, GI, , musculoskeletal, neuro, skin, endocrine and psych systems are negative, except as otherwise noted.    PHYSICAL EXAMINATION:  Constitutional: aaox3, cooperative, pleasant, not dyspneic/diaphoretic, no acute distress  Vitals reviewed: There were no vitals taken for this visit.  Wt:   Wt Readings from Last 2 Encounters:   04/12/21 102.1 kg (225 lb)   06/11/19 91.1 kg (200 lb 12.8 oz)      Eyes: Sclera anicteric/injected  Respiratory: Unlabored breathing  Skin: no jaundice  Psych: Normal affect    PERTINENT PAST MEDICAL HISTORY:  Past Medical History:   Diagnosis Date     Celiac disease      PSC (primary sclerosing cholangitis)      Ulcerative colitis (H)        PREVIOUS SURGERIES:  Past Surgical History:   Procedure Laterality Date     COLONOSCOPY N/A 6/5/2019    Procedure: COLONOSCOPY, WITH BIOPSY;  Surgeon: Jasen Watkins MD;  [facility]       ALLERGIES:     Allergies   Allergen Reactions     Fish-Derived Products Hives     Positive skin test to fish and shellfish on 9/14/2016.     Shellfish Allergy Hives, Itching and Shortness Of Breath     Gluten Meal        PERTINENT MEDICATIONS:    Current Outpatient Medications:      adalimumab (HUMIRA *CF*) 40 MG/0.4ML pen kit, Inject 0.4 mLs (40 mg) Subcutaneous every 7 days, Disp: 4 each, Rfl: 5     azaTHIOprine (IMURAN) 50 MG tablet, Take 3.5 tablets (175 mg) by mouth daily (Patient taking differently: Take 150 mg by mouth daily ), Disp: 315 tablet, Rfl: 0     EPINEPHrine (EPIPEN 2-NAGI) 0.3 MG/0.3ML injection 2-pack, Inject when needed for emergency treatment of severe allergic reactions (anaphylaxis)., Disp: , Rfl:      mesalamine (DELZICOL) 400 MG DR capsule, Take 6 capsules  (2,400 mg) by mouth 2 times daily, Disp: 360 capsule, Rfl: 4    SOCIAL HISTORY:  Social History     Socioeconomic History     Marital status: Single     Spouse name: Not on file     Number of children: Not on file     Years of education: Not on file     Highest education level: Not on file   Occupational History     Not on file   Tobacco Use     Smoking status: Never Smoker     Smokeless tobacco: Never Used   Substance and Sexual Activity     Alcohol use: No     Alcohol/week: 0.0 standard drinks     Comment: None     Drug use: No     Sexual activity: Not on file   Other Topics Concern     Not on file   Social History Narrative     Not on file     Social Determinants of Health     Financial Resource Strain: Not on file   Food Insecurity: Not on file   Transportation Needs: Not on file   Physical Activity: Not on file   Stress: Not on file   Social Connections: Not on file   Intimate Partner Violence: Not on file   Housing Stability: Not on file       FAMILY HISTORY:  Family History   Problem Relation Age of Onset     Hypertension Father      Lupus Paternal Grandmother      Colon Cancer Paternal Grandfather        Past/family/social history reviewed and no changes

## 2022-01-06 ENCOUNTER — TELEPHONE (OUTPATIENT)
Dept: GASTROENTEROLOGY | Facility: CLINIC | Age: 32
End: 2022-01-06
Payer: COMMERCIAL

## 2022-01-06 NOTE — TELEPHONE ENCOUNTER
Lab orders faxed to [facility] in [place] at [phone].     Called Uriah to let him know that the orders were faxed.

## 2022-01-06 NOTE — TELEPHONE ENCOUNTER
[facility] Call Center    Phone Message    May a detailed message be left on voicemail: yes     Reason for Call: Other:        Uriah is calling in asking if his lab orders can be sent to    [facility] - [address] [place] [address]    Please call him back to let him know when the orders are sent.          Action Taken: Message routed to:  [facility] Gastro    Travel Screening: Not Applicable

## 2022-01-10 ENCOUNTER — TELEPHONE (OUTPATIENT)
Dept: GASTROENTEROLOGY | Facility: CLINIC | Age: 32
End: 2022-01-10
Payer: COMMERCIAL

## 2022-01-11 RX ORDER — AZATHIOPRINE 50 MG/1
TABLET ORAL
Qty: 315 TABLET | Refills: 0 | OUTPATIENT
Start: 2022-01-11

## 2022-01-17 ENCOUNTER — TELEPHONE (OUTPATIENT)
Dept: GASTROENTEROLOGY | Facility: CLINIC | Age: 32
End: 2022-01-17
Payer: COMMERCIAL

## 2022-02-01 ENCOUNTER — TELEPHONE (OUTPATIENT)
Dept: GASTROENTEROLOGY | Facility: CLINIC | Age: 32
End: 2022-02-01
Payer: COMMERCIAL

## 2022-02-01 ENCOUNTER — HOSPITAL ENCOUNTER (OUTPATIENT)
Facility: AMBULATORY SURGERY CENTER | Age: 32
End: 2022-02-01
Attending: INTERNAL MEDICINE
Payer: COMMERCIAL

## 2022-02-01 NOTE — TELEPHONE ENCOUNTER
Screening Questions  Blue=prep questions Red=location Green=sedation   1. Are you active on mychart? Y    2. What insurance is in the chart? [employer]     3.  Ordering/Referring Provider: Ronak Bergman PA-C    4. BMI 28.1, If greater than 40 review exclusion criteria also will need EXTENDED PREP    5.  Respiratory Screening (If yes to any of the following HOSPITAL setting only):     Do you use daily home oxygen? N  Do you have mod to severe Obstructive Sleep Apnea? N (can be seen at [facility] or hospital setting)    Do you have Pulmonary Hypertension? N   Do you have UNCONTROLLED asthma? N    6. Have you had a heart or lung transplant? N  (If yes, please review exclusion criteria)    7. Are you currently on dialysis?N  (If yes, schedule in HOSPITAL setting only)(If yes, please send Golytely prep)    8. Do you have chronic kidney disease? N (If yes, please send Golytely prep)    9. Have you had a stroke or Transient ischemic attack (TIA) within 6 months? N (If yes, do not schedule at [facility])    10. In the past 6 months, have you had any heart related issues including cardiomyopathy or heart attack? N (If yes, please review exclusion criteria)           If yes, did it require cardiac stenting or other implantable device?  (If yes, please review exclusion criteria)      11. Do you have any implantable devices in your body (pacemaker, defib, LVAD)? N (If yes, schedule at [facility])    12. Do you take nitroglycerin? If yes, how often? N (if yes, schedule at HOSPITAL setting)    13. Are you currently taking any blood thinners?N (If yes- inform patient to follow up with PCP or provider for follow up instructions)     14. Are you a diabetic? N (If yes, please send Golytely prep)    15. (Females) Are you currently pregnant?   If yes, how many weeks?      16. Are you taking any prescription pain medications on a routine schedule? N If yes, MAC sedation and patient will need EXTENDED PREP.    17. Do you have any chemical dependencies such as  alcohol, street drugs, or methadone? N If yes, MAC sedation     18. Do you have any history of post-traumatic stress syndrome, severe anxiety or history of psychosis? N  If yes, MAC sedation.     19. Do you transfer independently? Y    20.  Do you have any issues with constipation? N   If yes, pt will need EXTENDED PREP     21. Preferred Pharmacy for Pre Prescription [facility]    Scheduling Details    Which Colonoscopy Prep was Sent?: MPREP  Type of Procedure Scheduled: COLON  Surgeon: PATSY  Date of Procedure: 8/3  [place] [facility]  [place]er (Please ask for phone number if not scheduled by patient):       Sedation Type: CS  Conscious Sedation- Needs  for 6 hours after the procedure  MAC/General-Needs  for 24 hours after procedure    Pre-op Required at [facility], [place], [place] and OR for MAC sedation: N  (if yes advise patient they will need a pre-op prior to procedure)      Informed patient they will need an adult  Y  Cannot take any type of public or medical transportation alone    Pre-Procedure Covid test to be completed at [facility] or Externally: PT WILL DO    Confirmed Nurse will call to complete assessment Y    Additional comments:  ([facility] PATIENTS NEED EXTENDED PREP)

## 2022-02-09 ENCOUNTER — TELEPHONE (OUTPATIENT)
Dept: GASTROENTEROLOGY | Facility: CLINIC | Age: 32
End: 2022-02-09
Payer: COMMERCIAL

## 2022-02-09 NOTE — TELEPHONE ENCOUNTER
Called [facility] at [phone] to check on lab status. They did not answer message left to follow up on. Writers number given along with fax number if done to have them sent to us.

## 2022-02-09 NOTE — TELEPHONE ENCOUNTER
Anna (lab) from [facility] in [place] returned call and stated that he has not been in for his labs they do have the standing order. The ones ordered by Dr. Watkins are expiring today, but the ones for Ronak Bergman are good until Jan. 2023. If have further questions can call her back at [phone].    Patient sent a reminder to get the labs done and have them sent to us via my chart.

## 2022-02-10 ENCOUNTER — TRANSFERRED RECORDS (OUTPATIENT)
Dept: HEALTH INFORMATION MANAGEMENT | Facility: CLINIC | Age: 32
End: 2022-02-10
Payer: COMMERCIAL

## 2022-02-10 LAB
ALT SERPL-CCNC: 30 U/L (ref 0–50)
AST SERPL-CCNC: 40 U/L (ref 5–34)

## 2022-05-14 ENCOUNTER — HEALTH MAINTENANCE LETTER (OUTPATIENT)
Age: 32
End: 2022-05-14

## 2022-06-28 ENCOUNTER — TELEPHONE (OUTPATIENT)
Dept: GASTROENTEROLOGY | Facility: CLINIC | Age: 32
End: 2022-06-28

## 2022-06-28 NOTE — TELEPHONE ENCOUNTER
Caller: Uriah    Procedure: Colon    Date, Location, and Surgeon of Procedure Cancelled: 8[place]3/22 [facility] [place]    Ordering Provider:Madalyn    Reason for cancel (please be detailed, any staff messages or encounters to note?): Provider not available        Rescheduled: Yes     If rescheduled:    Date: 8/17/22   [place] [facility]   Prep Resent: No changes(changes to prep?)   Covid Test Rescheduled: Home   Note any change or update to original order/sedation: Changed to MAC sedation since that was only option available in August

## 2022-06-29 ENCOUNTER — PATIENT OUTREACH (OUTPATIENT)
Dept: GASTROENTEROLOGY | Facility: CLINIC | Age: 32
End: 2022-06-29

## 2022-06-29 ENCOUNTER — MYC MEDICAL ADVICE (OUTPATIENT)
Dept: GASTROENTEROLOGY | Facility: CLINIC | Age: 32
End: 2022-06-29

## 2022-06-29 DIAGNOSIS — K51.00 ULCERATIVE PANCOLITIS WITHOUT COMPLICATION (H): Primary | ICD-10-CM

## 2022-06-29 NOTE — TELEPHONE ENCOUNTER
Patient left a message that preference to be scheduled with GI at [facility] for colonoscopy  Patient one of the our GI provides Dr Sylvester has opening on August 9th and left number to call   Also sent a my chart message.

## 2022-07-05 NOTE — TELEPHONE ENCOUNTER
----- Message from Cheyenne Sams RN sent at 7/5/2022  8:53 AM CDT -----  Regarding: fax colonoscopy order  [facility] Fax # [phone] [address] [place] [address]  Phone number [phone]     Please and thanks   Hope you had a great holiday weekend

## 2022-07-05 NOTE — TELEPHONE ENCOUNTER
External Colonoscopy order placed [facility] Fax # [phone] [address] [place] [address]  Phone number [phone]

## 2022-07-06 ENCOUNTER — TELEPHONE (OUTPATIENT)
Dept: GASTROENTEROLOGY | Facility: CLINIC | Age: 32
End: 2022-07-06

## 2022-07-06 NOTE — TELEPHONE ENCOUNTER
[facility] Prior Authorization Team   Phone: [phone]  Fax: [phone]    PA Initiation    Medication: Humira-Approved  Insurance Company: [employer] - Phone [phone] Fax [phone]  Pharmacy Filling the Rx: [facility] - [place] [address]  Filling Pharmacy Phone: [phone]  Filling Pharmacy Fax: [phone]  Start Date: 7/1/2022    Prior Authorization Approval    Authorization Effective Date: 6/1/2022  Authorization Expiration Date: 7/1/2023  Medication: Humira-Approved  Approved Dose/Quantity: 4ml/28 days  Reference #: [id number]   Insurance Company: [employer] - Phone [phone] Fax [phone]  Expected CoPay: $25     CoPay Card Available: No    Foundation Assistance Needed:    Which Pharmacy is filling the prescription (Not needed for infusion/clinic administered): [facility] [place] [address]  Pharmacy Notified: Yes  Patient Notified: Yes

## 2022-07-15 ENCOUNTER — TELEPHONE (OUTPATIENT)
Dept: GASTROENTEROLOGY | Facility: CLINIC | Age: 32
End: 2022-07-15

## 2022-07-15 NOTE — TELEPHONE ENCOUNTER
Caller: Uriah Mortensen      Procedure: Colon    Date, Location, and Surgeon of Procedure Cancelled: 8/17, Roland MOORE    Ordering Provider:Roland    Reason for cancel (please be detailed, any staff messages or encounters to note?): Pt having procedure done at external location.        Rescheduled: No

## 2022-07-21 DIAGNOSIS — K51.90 ULCERATIVE COLITIS (H): ICD-10-CM

## 2022-07-25 NOTE — TELEPHONE ENCOUNTER
DELZICOL 400 MG DR capsule   Last Written Prescription Date:  4/17/20  Last Fill Quantity: 360,   # refills: 1  Last Office Visit : 1/5/22  Future Office visit:  none    Routing refill request to provider for review/approval because: end date 6/8/21 by other

## 2022-08-02 ENCOUNTER — TELEPHONE (OUTPATIENT)
Dept: GASTROENTEROLOGY | Facility: CLINIC | Age: 32
End: 2022-08-02

## 2022-08-02 NOTE — TELEPHONE ENCOUNTER
Spoke with Uriah offer him an earlier appt on 10/10/22, pt confirm excepting of appt. Pt states he is only available in the afternoon due to school. Pt confirm understating and agreement to be in [place] for the appt.

## 2022-08-10 DIAGNOSIS — K51.90 ULCERATIVE COLITIS (H): ICD-10-CM

## 2022-08-11 ENCOUNTER — TRANSFERRED RECORDS (OUTPATIENT)
Dept: HEALTH INFORMATION MANAGEMENT | Facility: CLINIC | Age: 32
End: 2022-08-11

## 2022-08-11 NOTE — TELEPHONE ENCOUNTER
Dr. Watkins,  Spoke with Uriah, pt asking for refills for mesalamine (Dezicol). He confirm he is currently taking mesalamine (Dezicol) 400 mg 6 capsules twice daily.    Last visit with MICHELLE Bergman on 1/5/22:  My recommendations are summarized as follows:  -- Continue humira every 7 days, switch to lialda 4 pills per day once per day, azathioprine 175 mg once per day.    Uriah stated that he did not start the Lialda.    Spoke with [facility] and they state that on 1/5/22 and 1/16/22 they request a PA but did not hear from us and continue to fill the Dezicol. Did not find any record of a PA requested for Lialda.    Uriah have enough Dezicol for another week.    Please advice if ok to continue refill for Dezicol and if you want another attempt at Lialda and get PA for Lialda.    Thank you.  Gurmeet

## 2022-08-12 RX ORDER — MESALAMINE 400 MG/1
CAPSULE, DELAYED RELEASE ORAL
Qty: 360 CAPSULE | Refills: 4 | Status: SHIPPED | OUTPATIENT
Start: 2022-08-12 | End: 2022-10-10

## 2022-08-15 RX ORDER — MESALAMINE 400 MG/1
CAPSULE, DELAYED RELEASE ORAL
Qty: 360 CAPSULE | Refills: 4 | OUTPATIENT
Start: 2022-08-15

## 2022-08-17 DIAGNOSIS — K51.00 ULCERATIVE PANCOLITIS WITHOUT COMPLICATION (H): ICD-10-CM

## 2022-08-22 NOTE — TELEPHONE ENCOUNTER
azaTHIOprine (IMURAN) 50 MG tablet   Last Written Prescription Date:  1/5/2022  Last Fill Quantity: 315 tab,   # refills: 1  Last Office Visit: 1/5/2022  [facility]  Future Office visit:  10/10/2022    Routing refill request to provider for review/approval because:  Failed GI medication protocol: overdue labs  - >3 months since last required labs-CBC, CRP, ESR, Hepatic Panel were done per chart review.  - results found outside lab 2/10/2022 CBC, CRP, ESR, Hepatic Panel, GFR (scanned media)

## 2022-08-25 ENCOUNTER — PATIENT OUTREACH (OUTPATIENT)
Dept: GASTROENTEROLOGY | Facility: CLINIC | Age: 32
End: 2022-08-25

## 2022-09-03 ENCOUNTER — HEALTH MAINTENANCE LETTER (OUTPATIENT)
Age: 32
End: 2022-09-03

## 2022-09-06 RX ORDER — AZATHIOPRINE 50 MG/1
175 TABLET ORAL DAILY
Qty: 315 TABLET | Refills: 0 | Status: SHIPPED | OUTPATIENT
Start: 2022-09-06 | End: 2023-03-22

## 2022-09-12 ENCOUNTER — MYC MEDICAL ADVICE (OUTPATIENT)
Dept: GASTROENTEROLOGY | Facility: CLINIC | Age: 32
End: 2022-09-12

## 2022-09-12 NOTE — TELEPHONE ENCOUNTER
Hi Team,  Can you obtain the biopsy results from Uriah's colonoscopy dated 8/11/22 done by [facility]?  Phone #[phone]  Fax#[phone]    Thank you.  Gurmeet

## 2022-09-12 NOTE — TELEPHONE ENCOUNTER
Uriah Lauren's colonoscopy record is in Media for your review dated 8/11/22.    Thank you.  Gurmeet

## 2022-10-10 ENCOUNTER — TELEPHONE (OUTPATIENT)
Dept: GASTROENTEROLOGY | Facility: CLINIC | Age: 32
End: 2022-10-10

## 2022-10-10 ENCOUNTER — VIRTUAL VISIT (OUTPATIENT)
Dept: GASTROENTEROLOGY | Facility: CLINIC | Age: 32
End: 2022-10-10
Payer: COMMERCIAL

## 2022-10-10 DIAGNOSIS — K51.00 ULCERATIVE PANCOLITIS WITHOUT COMPLICATION (H): Primary | ICD-10-CM

## 2022-10-10 PROCEDURE — 99214 OFFICE O/P EST MOD 30 MIN: CPT | Mod: GT | Performed by: INTERNAL MEDICINE

## 2022-10-10 RX ORDER — MESALAMINE 1.2 G/1
4800 TABLET, DELAYED RELEASE ORAL
Qty: 360 TABLET | Refills: 3 | Status: SHIPPED | OUTPATIENT
Start: 2022-10-10 | End: 2023-02-09

## 2022-10-10 NOTE — TELEPHONE ENCOUNTER
----- Message from Jasen Watkins MD sent at 10/10/2022  2:44 PM CDT -----  [facility] team-     I ordered labs for Uriah - can you please fax the labs I ordered to his local lab?    Thanks !

## 2022-10-10 NOTE — TELEPHONE ENCOUNTER
Prior Authorization Specialty Medication Request    Medication/Dose: mesalamine (LIALDA) 1.2 g DR tablet  ICD code (if different than what is on RX):  Ulcerative pancolitis without complication (H) [K51.00]  Previously Tried and Failed:      Important Lab Values:   Rationale:     Insurance [facility]: [facility]   Insurance ID: [id number]  Insurance Phone Number: [phone]     Pharmacy Information (if different than what is on RX)  [facility]  [id number] [place] [address] AT [facility] [address]  Phone:  [phone]      Urban Lynn MA

## 2022-10-10 NOTE — NURSING NOTE
Patient declined individual allergy and medication review by support staff because pt verified during echeckin.  Natali Fu VF

## 2022-10-10 NOTE — TELEPHONE ENCOUNTER
Spoke with Uriah, he confirm wanting labs to be sent to [facility] in [place], [place]  Fax#[phone].    Faxed orders.

## 2022-10-10 NOTE — PROGRESS NOTES
IBD CLINIC VISIT    CC/REFERRING MD:  Referred Self  REASON FOR CONSULTATION: f/u PSC colitis    ASSESSMENT/PLAN  32 year old man with PSC colitis     1. Colitis  Current medication:    - adalimumab weekly (started 12/2018, increased to weekly summer 2019)   - Azathioprine  175 mg daily   - Mesalamine 4.8g per day  Current clinical disease activity: remission   Current endoscopic disease activity: 9/2022: ulcers < 25% colon. Biopsies mild inflammation    He continues to be in clinical remission on weekly adalimumab, azathioprine, mesalamine 4.8 g/day.  He believes that moving to adalimumab weekly have the greatest impact on his symptoms.  He and his father both question if he does need to be on the other medications.  We discussed that there appears to be an endoscopic improvement with the addition of azathioprine and mesalamine.  Discussed that his greatest risk long-term was for colorectal cancer and that treating inflammation to normal or mild decrease the risk of colon cancer.  Discussed it would be ideal to continue to monitor him on his current meds, but de-escalating other therapies is certainly reasonable in the future as we continuously reevaluate the risk-benefit profile.    -- Labs: CBC, LFTs, CRP, ESR, thiopurine metabolites   -- Continue weekly Humira  -- Continue azathiorpine 175mg daily   -- Continue mesalamine 4.8g per day    2. PSC  - continue to follow with Dr. Herrera in liver clinic. Due for follow-up.     3. Reported celiac disease  EGD 2015 at [facility] [place] does state mucosal changes seen consistent with celiac, however path unavailable. EGD 2018 normal duodenal bxs on gluten free diet.  Celiac serologies normal in [facility] records.  Will have cramping and diarrhea if he accidentally eats gluten.  -- Annual celiac labs    4. Eosinophilic esophagitis  No dysphagia at today's visit.  Unclear if he has EoE or reactive changes related to reflux.  Denies any dysphagia; his father has dysphagia history.  Had > 15  eosinophils/hpf on distal esophageal biopsies 18, but biopsies from mid-esophagus were normal.    -- Does not take PPI  -- Monitor.     5. IBD dysplasia surveillance: Colitis with PSC. Needsa annual dysplasia surveillance   - yearly dysplasia colonoscopy with bx due summer 2023 (ordered)     IBD HISTORY  Age at diagnosis: 23  Extent of disease: Pancolitis   Prior IBD surgeries: None  Prior IBD Medications:  - Vedolizumab (2016 - 2017) stopped due to ongoing inflammation on colonoscopy (not clear if drug/Ab levels were checked)  - Infliximab-developed antibodies (2018)  - Budesonide  - Asacol      DRUG MONITORING  TPMT enzyme activity: 29.4 (18)     6-TGN/6-MMPN levels: NA     Biologic concentration:  Infliximab 18-drug level 0, antibody greater than 200      HPI:   Here today for a follow-up evaluation. Last video visit he recalls being informed that there was a different for of mesalamine that he could use.      Clinically he is feeling well. He reports normal stools. 1-2 stools per day, formed stools. No blood in stools.     Had a colonoscopy locally in 2022. Had mild inflammation. Biopsies returned as mild in the right colon and normal in the left. This was improved from  biopsies with severe in the right on pathology.     Overall, he really feels well.     Woody score:   Stool freq: 0 (baseline stools frequency)  Rectal bleedin (None)  PGA: 0 (normal)  Endoscopy: Images unable for review.     sIBDQ:  IBDQ Score Date IBDQ - Total Score  (Higher score better)   10/10/2022 62   2022 63   2019 65   2018 64   2018 65      ROS:    Constitutional, HEENT, cardiovascular, pulmonary, GI, , musculoskeletal, neuro, skin, endocrine and psych systems are negative, except as otherwise noted.    PHYSICAL EXAMINATION:  Constitutional: aaox3, cooperative, pleasant, not dyspneic/diaphoretic, no acute distress  Vitals reviewed: There were no vitals taken for this visit.  Wt:    Wt Readings from Last 2 Encounters:   04/12/21 102.1 kg (225 lb)   06/11/19 91.1 kg (200 lb 12.8 oz)      Constitutional - general appearance is well and in no acute distress. Body habitus normal  Eyes - No redness or discharge  Respiratory - No cough, unlabored breathing  Musculoskeletal - range of motion intact: Neck and arms  Skin - No discoloration or lesions  Neurological - No tremors, headaches  Psychiatric - No anxiety, alert & oriented     PERTINENT PAST MEDICAL HISTORY:  Past Medical History:   Diagnosis Date     Celiac disease      PSC (primary sclerosing cholangitis)      Ulcerative colitis (H)        PREVIOUS SURGERIES:  Past Surgical History:   Procedure Laterality Date     COLONOSCOPY N/A 6/5/2019    Procedure: COLONOSCOPY, WITH BIOPSY;  Surgeon: Jasen Watkins MD;  Location: [facility]       ALLERGIES:     Allergies   Allergen Reactions     Fish-Derived Products Hives     Positive skin test to fish and shellfish on 9/14/2016.     Shellfish Allergy Hives, Itching and Shortness Of Breath     Gluten Meal        PERTINENT MEDICATIONS:    Current Outpatient Medications:      adalimumab (HUMIRA *CF*) 40 MG/0.4ML pen kit, Inject 0.4 mLs (40 mg) Subcutaneous every 7 days, Disp: 4 each, Rfl: 5     azaTHIOprine (IMURAN) 50 MG tablet, Take 3.5 tablets (175 mg) by mouth daily, Disp: 315 tablet, Rfl: 0     DELZICOL 400 MG DR capsule, TAKE 6 CAPSULES(2400 MG) BY MOUTH TWICE DAILY, Disp: 360 capsule, Rfl: 4     EPINEPHrine (EPIPEN 2-NAGI) 0.3 MG/0.3ML injection 2-pack, Inject when needed for emergency treatment of severe allergic reactions (anaphylaxis)., Disp: , Rfl:     SOCIAL HISTORY:  Social History     Socioeconomic History     Marital status: Single     Spouse name: Not on file     Number of children: Not on file     Years of education: Not on file     Highest education level: Not on file   Occupational History     Not on file   Tobacco Use     Smoking status: Never     Smokeless tobacco: Never   Substance  and Sexual Activity     Alcohol use: No     Alcohol/week: 0.0 standard drinks     Comment: None     Drug use: No     Sexual activity: Not on file   Other Topics Concern     Not on file   Social History Narrative     Not on file     Social Determinants of Health     Financial Resource Strain: Not on file   Food Insecurity: Not on file   Transportation Needs: Not on file   Physical Activity: Not on file   Stress: Not on file   Social Connections: Not on file   Intimate Partner Violence: Not on file   Housing Stability: Not on file       FAMILY HISTORY:  Family History   Problem Relation Age of Onset     Hypertension Father      Lupus Paternal Grandmother      Colon Cancer Paternal Grandfather        Past/family/social history reviewed and no changes

## 2022-10-10 NOTE — PROGRESS NOTES
Uriah is a 32 year old who is being evaluated via a billable video visit.      How would you like to obtain your AVS? MyChart  If the video visit is dropped, the invitation should be resent by: Send to e-mail at: [email or web address]  Will anyone else be joining your video visit? No father will [email or web address]    Natali MACIEL    Video-Visit Details    Video Start Time: 2:21 PM    Type of service:  Video Visit    Video End Time:2:45 PM    Originating Location (pt. Location): Home    Distant Location (provider location):  [facility] SPECIALTY [facility] JENI     Platform used for Video Visit: plista

## 2022-10-10 NOTE — PATIENT INSTRUCTIONS
-- Continue current colitis meds: Humira, azathioprine and mesalamine    -- New prescription for mesalamine written: 800mg - 4 tabs once a day (can take all at once).     -- Colonoscopy over the summer was very reassuring - biopsies with only mild inflammation (2 years ago was severe).     -- Colonoscopy early summer 2023 with Dr. Watkins    -- Labs due (GI RN to help coordinate locally).

## 2022-10-11 NOTE — TELEPHONE ENCOUNTER
Spoke with [facility], she confirm receipt of lab orders.    Sent pt message with above information.

## 2022-10-12 NOTE — TELEPHONE ENCOUNTER
Prior Authorization Not Needed per Insurance    Medication: mesalamine (LIALDA) 1.2 g DR tablet--NO PA NEEDED  Insurance Company: [employer] - Phone [phone] Fax [phone]  Expected CoPay:      Pharmacy Filling the Rx: [id number] - [place], [place] - [facility] [address] [facility]  Pharmacy Notified: Yes  Patient Notified: Yes **Instructed pharmacy to notify patient when script is ready to /ship.**    Insurance prefers brand name.

## 2022-10-25 ENCOUNTER — TRANSFERRED RECORDS (OUTPATIENT)
Dept: HEALTH INFORMATION MANAGEMENT | Facility: CLINIC | Age: 32
End: 2022-10-25

## 2022-10-25 LAB
ALT SERPL-CCNC: 34 U/L (ref 0–50)
AST SERPL-CCNC: 47 U/L (ref 17–59)

## 2022-10-31 ENCOUNTER — MYC MEDICAL ADVICE (OUTPATIENT)
Dept: GASTROENTEROLOGY | Facility: CLINIC | Age: 32
End: 2022-10-31

## 2022-10-31 NOTE — TELEPHONE ENCOUNTER
To: SURGERY CLINIC GI NURSES-      From: Uriah Mortensen      Created: 10/31/2022 7:50 AM        *-*-*This message has not been handled.*-*-*    There is an insurance issue when I tried to fulfill my lialda prescription.         Spoke with [facility], technician states that pt's insurance require brand name not generic which they automatically fill generic. He process with brand name Lialda and it was approve. They will get it ready for pt.    October 31, 2022    Called Uriah, Left message and contact info to return call regarding: rx    Sent pt a portal message regarding above.

## 2022-12-30 NOTE — TELEPHONE ENCOUNTER
adalimumab (HUMIRA *CF*) 40 MG/0.4ML pen kit 4 each 5 1/5/2022         Last Written Prescription Date:  1-5-2022  Last Fill Quantity: 4,   # refills: 5  Last Office Visit : 10-  Future Office visit:  NONE    OUTSIDE LABS FROM 10- ARE WNL AND IN THE CHART.        Kathleen M Doege RN

## 2023-01-02 ENCOUNTER — TELEPHONE (OUTPATIENT)
Dept: GASTROENTEROLOGY | Facility: CLINIC | Age: 33
End: 2023-01-02

## 2023-01-02 DIAGNOSIS — K51.00 ULCERATIVE PANCOLITIS WITHOUT COMPLICATION (H): Primary | ICD-10-CM

## 2023-01-02 RX ORDER — ADALIMUMAB 40MG/0.4ML
40 KIT SUBCUTANEOUS
Qty: 4 EACH | Refills: 5 | OUTPATIENT
Start: 2023-01-02

## 2023-01-02 NOTE — TELEPHONE ENCOUNTER
PA Initiation    Medication: Humira pa started  Insurance Company: [employer] - Phone [phone] Fax [phone]  Pharmacy Filling the Rx:    Filling Pharmacy Phone:    Filling Pharmacy Fax:    Start Date: 1/2/2023    [id number]

## 2023-01-02 NOTE — PROGRESS NOTES
Refilled humira with 3 refills   Humira 40 mg every 7 days   Last clinic in October   Last labs in October   Will need to continue labs every 3 months

## 2023-01-05 ENCOUNTER — PATIENT OUTREACH (OUTPATIENT)
Dept: GASTROENTEROLOGY | Facility: CLINIC | Age: 33
End: 2023-01-05

## 2023-01-05 NOTE — PROGRESS NOTES
CLINIC COORDINATORS,  Please call pt to schedule Return/follow up visit in 6 months from 10/10/22 with Dr. Watkins or MICHELLE Bergman.    Please let RN know when scheduled.    Thank you.  Gurmeet

## 2023-01-06 ENCOUNTER — TELEPHONE (OUTPATIENT)
Dept: GASTROENTEROLOGY | Facility: CLINIC | Age: 33
End: 2023-01-06

## 2023-01-06 NOTE — TELEPHONE ENCOUNTER
SALVATORE and sent [employer] for scheduling:  Return/follow up visit in 6 months from 10/10/22 (around 4/10/23) with either Dr. Watkins or MICHELLE Bergman.

## 2023-01-30 DIAGNOSIS — K51.00 ULCERATIVE PANCOLITIS WITHOUT COMPLICATION (H): ICD-10-CM

## 2023-02-02 RX ORDER — MESALAMINE 1.2 G/1
4800 TABLET, DELAYED RELEASE ORAL
Qty: 360 TABLET | Refills: 3 | OUTPATIENT
Start: 2023-02-02

## 2023-02-02 NOTE — TELEPHONE ENCOUNTER
mesalamine (LIALDA) 1.2 g DR tablet   Take 4 tablets (4,800 mg) by mouth daily (with breakfast)    Last Written Prescription Date:  10/10/22  Last Fill Quantity: 360,   # refills: 3  Sent to:[id number] - [place] - [address] AT [facility] [address]   Refills on file.

## 2023-02-09 ENCOUNTER — MYC MEDICAL ADVICE (OUTPATIENT)
Dept: GASTROENTEROLOGY | Facility: CLINIC | Age: 33
End: 2023-02-09
Payer: COMMERCIAL

## 2023-02-09 ENCOUNTER — TELEPHONE (OUTPATIENT)
Dept: GASTROENTEROLOGY | Facility: CLINIC | Age: 33
End: 2023-02-09
Payer: COMMERCIAL

## 2023-02-09 DIAGNOSIS — K51.00 ULCERATIVE PANCOLITIS WITHOUT COMPLICATION (H): Primary | ICD-10-CM

## 2023-02-09 RX ORDER — MESALAMINE 1.2 G/1
4800 TABLET, DELAYED RELEASE ORAL
Qty: 360 TABLET | Refills: 0 | Status: SHIPPED | OUTPATIENT
Start: 2023-02-09 | End: 2023-06-29

## 2023-02-09 NOTE — TELEPHONE ENCOUNTER
To: SURGERY CLINIC GI NURSES-[facility]      From: Uriah Mortensen      Created: 2/9/2023 9:37 AM        *-*-*This message has not been handled.*-*-*    I need to refill my Lialda but they did approval from my prescriber. I had this issue the last time I tried to fill this medication.        Rx Refill Request:  Mesalamine (Lialda) 1.2g DR tab) 4 tabs PO daily in AM  Last Written Prescription Date: 10/10/22  Last Fill Quantity:  360,   # refills: 3    Future Office visit: 4/11/23  Last Office Visit :  10/10/22  Dx: Colitis  Current medication:               - adalimumab weekly (started 12/2018, increased to weekly summer 2019)              - Azathioprine  175 mg daily              - Mesalamine 4.8g per day  -- Labs: CBC, LFTs, CRP, ESR, thiopurine metabolites   -- Continue weekly Humira  -- Continue azathiorpine 175mg daily   -- Continue mesalamine 4.8g per day    Standing Lab Orders expiration:  CBC, LFTs, CRP, ESR,   Monitoring Lab completed: 10/10/22  BUN/Creatinine annually: 1/5/22    Refills sent to [facility] to cover until [facility].  Sent reminder via portal message to ask for new prescription at RV.

## 2023-02-09 NOTE — TELEPHONE ENCOUNTER
February 9, 2023    Called Uriah, Left message and contact info to return call regarding: Humira      Sent portal message with above information.

## 2023-02-09 NOTE — TELEPHONE ENCOUNTER
[facility] GASTRO CLINIC SUPPORT,  Please fax standing lab orders (CBC, CRP, ESR, LFTs) and Creatinine to     [facility] in [place], [place]  (O) [phone]  Fax#[phone].    Please call to confirm receipt and let pt know when confirm via [employer].    Thank you.  Gurmeet

## 2023-02-09 NOTE — TELEPHONE ENCOUNTER
----- Message from Ronak Bergman PA-C sent at 2/8/2023  1:11 PM CST -----  Regarding: FW: Late To Fill - Humira  Maksim Levi-- do you mind following up with him?  Thank you!  Ronak  ----- Message -----  From: Diana Casey  Sent: 2/8/2023  10:43 AM CST  To: Ronak Bergman PA-C  Subject: Late To Fill - Humira                            Hello,    [facility] has been unable to reach this patient to refill their medication.  According to our records, the patient is overdue to refill and adherence may be an issue at this point.    Medication and strength: Humira Pen #CF# 40mg/0.4ml  Last fill date and day supply: Sent 1mo supply on 12/14/2022    If the medication is on hold, been changed, discontinued, sent to a different pharmacy, or any other information is available, please reply or contact us at [phone].    Thank you,    [facility]

## 2023-02-09 NOTE — TELEPHONE ENCOUNTER
Lab order for creatine, hepatic, cbc, crp, and esr printed and faxed to [facility] at [phone].

## 2023-02-10 ENCOUNTER — PATIENT OUTREACH (OUTPATIENT)
Dept: GASTROENTEROLOGY | Facility: CLINIC | Age: 33
End: 2023-02-10
Payer: COMMERCIAL

## 2023-02-10 ENCOUNTER — TELEPHONE (OUTPATIENT)
Dept: GASTROENTEROLOGY | Facility: CLINIC | Age: 33
End: 2023-02-10
Payer: COMMERCIAL

## 2023-02-10 NOTE — TELEPHONE ENCOUNTER
Screening Questions  BLUE  KIND OF PREP RED  LOCATION [review exclusion criteria] GREEN  SEDATION TYPE        Y Are you active on mychart?       Jasen Watkins MD   Ordering/Referring Provider?        HP What type of coverage do you have?      N Have you had a positive covid test in the last 14 days?     28.7 1. BMI  [BMI 40+ - review exclusion criteria]    Y  2. Are you able to give consent for your medical care? [IF NO,RN REVIEW]          N  3. Are you taking any prescription pain medications on a routine schedule   (ex narcotics: oxycodone, roxicodone, oxycontin,  and percocet)? [RN Review]          3a. EXTENDED PREP What kind of prescription?     N 4. Do you have any chemical dependencies such as alcohol, street drugs, or methadone?        **If yes 3- 5 , please schedule with MAC sedation.**          IF YES TO ANY 6 - 10 - HOSPITAL SETTING ONLY.     N 6.   Do you need assistance transferring?     N 7.   Have you had a heart or lung transplant?    N 8.   Are you currently on dialysis?   N 9.   Do you use daily home oxygen?   N 10. Do you take nitroglycerin?   10a.  If yes, how often?     11. [FEMALES]  N Are you currently pregnant?    11a.  If yes, how many weeks? [ Greater than 12 weeks, OR NEEDED]    N 12. Do you have Pulmonary Hypertension? *NEED PAC APPT AT [facility] w/ [facility]*     N 13. [review exclusion criteria]  Do you have any implantable devices in your body (pacemaker, defib, LVAD)?    N 14. In the past 6 months, have you had any heart related issues including cardiomyopathy or heart attack?     14a.  If yes, did it require cardiac stenting if so when?     N 15. Have you had a stroke or Transient ischemic attack (TIA - aka  mini stroke ) within 6 months?      N 16. Do you have mod to severe Obstructive Sleep Apnea?  [Hospital only]    N 17. Do you have SEVERE AND UNCONTROLLED asthma? *NEED PAC APPT AT [facility] w/MAC*     N 18. Are you currently taking any blood thinners?     18a. If yes, inform patient  "to \"follow up w/ ordering provider for bridging instructions.\"    N 19. Do you take the medication Phentermine?    19a. If yes, \"Hold for 7 days before procedure.  Please consult your prescribing provider if you have questions about holding this medication.\"     N  20. Do you have chronic kidney disease?      N  21. Do you have a diagnosis of diabetes?     N  22. On a regular basis do you go 3-5 days between bowel movements?      23. Preferred LOCAL Pharmacy for Pre Prescription    [ LIST ONLY ONE PHARMACY]        [id number] - [place], [place] - [address] AT [facility] [address]      - CLOSING REMINDERS -    Informed patient they will need an adult    Cannot take any type of public or medical transportation alone    Conscious Sedation- Needs  for 6 hours after the procedure       MAC/General-Needs  for 24 hours after procedure    Pre-Procedure Covid test to be completed [[facility] PCR Testing Required]    Confirmed Nurse will call to complete assessment       - SCHEDULING DETAILS -  NO Hospital Setting Required? If yes, what is the exclusion?:    PATSY  Surgeon    6/14  Date of Procedure  Lower Endoscopy [Colonoscopy]  Type of Procedure Scheduled  [facility]   [facility]-If you answer yes to questions #8, #20, #21Which Colonoscopy Prep was Sent?     MODERATE Sedation Type     N PAC / Pre-op Required                 "

## 2023-02-10 NOTE — TELEPHONE ENCOUNTER
Sent a my chart message to patient to call to schedule colonoscopy asap as Dr Watkins is booking up fast.

## 2023-02-10 NOTE — TELEPHONE ENCOUNTER
Called [facility] to confirm that the lab orders have been received.     Kayla at [facility] confirmed they have the lab orders.

## 2023-02-13 ENCOUNTER — TELEPHONE (OUTPATIENT)
Dept: GASTROENTEROLOGY | Facility: CLINIC | Age: 33
End: 2023-02-13
Payer: COMMERCIAL

## 2023-02-13 NOTE — TELEPHONE ENCOUNTER
Central Prior Authorization Team   Phone: [phone]    PA Initiation    Medication: Mesalamine 1.2GM  tablets    Insurance Company: [employer] - Phone [phone] Fax [phone]  Pharmacy Filling the Rx: [id number] - [place], [place] - [facility] [address] AT [facility]  Filling Pharmacy Phone: [phone]  Filling Pharmacy Fax:    Start Date: 2/13/2023

## 2023-02-13 NOTE — TELEPHONE ENCOUNTER
Received fax from [employer] requesting PA.      Prior Authorization Retail Medication Request    Medication/Dose:    Disp Refills Start End MAREN   mesalamine (LIALDA) 1.2 g DR tablet 360 tablet 0 2/9/2023  No   Sig - Route: Take 4 tablets (4,800 mg) by mouth daily (with breakfast) - Oral   Sent to pharmacy as: Mesalamine 1.2 GM Oral Tablet Delayed Release (LIALDA)   Class: E-Prescribe   Order: [id number]   E-Prescribing Status: Receipt confirmed by pharmacy (2/9/2023 11:05 AM CST)               ICD code (if different than what is on RX):    Previously Tried and Failed:    Rationale:      Insurance Name:    Insurance ID:        Pharmacy Information (if different than what is on RX)  Name:    Phone:

## 2023-02-14 NOTE — TELEPHONE ENCOUNTER
PRIOR AUTHORIZATION DENIED    Medication: Mesalamine 1.2GM dr tablets      Denial Date: 2/14/2023    Denial Rational:  Insurance prefers brand Lialda.  Called [facility] pharmacy [phone] spoke with pharmacist changed this to brand name Lialda 1.2gm.  They now have paid claim and will order this in for patient.  They will contact patient when this is ready for .         Appeal Information:

## 2023-02-14 NOTE — TELEPHONE ENCOUNTER
Insurance prefers brand Lialda.  Called [facility] pharmacy [phone] spoke with pharmacist changed this to brand name Lialda 1.2gm.  They now have paid claim and will order this in for patient.  They will contact patient when this is ready for .

## 2023-03-15 DIAGNOSIS — K51.00 ULCERATIVE PANCOLITIS WITHOUT COMPLICATION (H): ICD-10-CM

## 2023-03-22 RX ORDER — AZATHIOPRINE 50 MG/1
TABLET ORAL
Qty: 315 TABLET | Refills: 0 | Status: SHIPPED | OUTPATIENT
Start: 2023-03-22 | End: 2023-09-14

## 2023-05-31 ENCOUNTER — TELEPHONE (OUTPATIENT)
Dept: GASTROENTEROLOGY | Facility: CLINIC | Age: 33
End: 2023-05-31

## 2023-05-31 NOTE — TELEPHONE ENCOUNTER
Attempted to contact patient regarding upcoming Colonoscopy  procedure on 6/14/23 for pre assessment questions. No answer.     Left message to return call to [phone] #[phone]    Discuss Covid policy and designated  policy.    Pre op exam? N/A    Arrival time: 0715. Procedure time: 0815    [facility] [address] [place] [address], [address] Floor, [place] [address]    Sedation type: Conscious sedation     NSAIDs? No NSAID medications per patient's medication list.  RN will verify with pre-assessment call.    Anticoagulants: No    Electronic implanted devices? No    Diabetic? No    Indication for procedure: Ulcerative pancolitis     Bowel prep recommendation: Miralax prep without magnesium citrate     Prep instructions sent via [employer].      Haley Merino RN  Endoscopy Procedure Pre Assessment RN

## 2023-06-01 NOTE — TELEPHONE ENCOUNTER
Pre assessment questions completed for upcoming Colonoscopy  procedure scheduled on 6/14/2023    COVID policy reviewed.     Reviewed procedural arrival time 0715 and facility location [facility]; [address] [place] [address], [address] Floor, [place] [address]    Designated  policy reviewed. Instructed to have someone stay 6 hours post procedure.     NSAIDs? No    Anticoagulation/blood thinners? No    Electronic implanted devices? No    Diabetic? No    Writer reviewed when the dietary changes take place from the procedure prep instructions.Pt declined writer review the remaining portion of the instructions. Pt will call with any questions.     Patient verbalized understanding and had no questions or concerns at this time.    Lila Herrera RN  Endoscopy Procedure Pre Assessment RN

## 2023-06-02 ENCOUNTER — HEALTH MAINTENANCE LETTER (OUTPATIENT)
Age: 33
End: 2023-06-02

## 2023-06-12 DIAGNOSIS — K51.00 ULCERATIVE PANCOLITIS WITHOUT COMPLICATION (H): ICD-10-CM

## 2023-06-12 RX ORDER — MESALAMINE 1.2 G/1
4800 TABLET, DELAYED RELEASE ORAL
Qty: 360 TABLET | Refills: 0 | Status: CANCELLED | OUTPATIENT
Start: 2023-06-12

## 2023-06-12 NOTE — TELEPHONE ENCOUNTER
Requested Prescriptions   Pending Prescriptions Disp Refills     mesalamine (LIALDA) 1.2 g DR tablet 360 tablet 0     Sig: Take 4 tablets (4,800 mg) by mouth daily (with breakfast)       There is no refill protocol information for this order        Last Written Prescription Date:  2/9/2023  Last Fill Quantity: 360 tablet,  # refills: 0   Last office visit: Visit date not found ; last virtual visit: 10/10/2022 with prescribing provider:  Jasen Watkins MD   Future Office Visit:  None

## 2023-06-13 ENCOUNTER — ANESTHESIA EVENT (OUTPATIENT)
Dept: SURGERY | Facility: AMBULATORY SURGERY CENTER | Age: 33
End: 2023-06-13
Payer: COMMERCIAL

## 2023-06-13 NOTE — ANESTHESIA PREPROCEDURE EVALUATION
Anesthesia Pre-Procedure Evaluation    Patient: Uriah Mortensen   MRN: [id number] : 1990        Procedure : Procedure(s):  COLONOSCOPY          Past Medical History:   Diagnosis Date     Celiac disease      PSC (primary sclerosing cholangitis)      Ulcerative colitis (H)       Past Surgical History:   Procedure Laterality Date     COLONOSCOPY N/A 2019    Procedure: COLONOSCOPY, WITH BIOPSY;  Surgeon: Jasen Watkins MD;  [facility]      Allergies   Allergen Reactions     Fish-Derived Products Hives     Positive skin test to fish and shellfish on 2016.     Shellfish Allergy Hives, Itching and Shortness Of Breath     Gluten Meal       Social History     Tobacco Use     Smoking status: Never     Smokeless tobacco: Never   Vaping Use     Vaping status: Not on file   Substance Use Topics     Alcohol use: No     Alcohol/week: 0.0 standard drinks of alcohol     Comment: None      Wt Readings from Last 1 Encounters:   21 102.1 kg (225 lb)           Physical Exam    Airway        Mallampati: I   TM distance: > 3 FB   Neck ROM: full   Mouth opening: > 3 cm    Respiratory Devices and Support         Dental       (+) Minor Abnormalities - some fillings, tiny chips      Cardiovascular   cardiovascular exam normal          Pulmonary   pulmonary exam normal                OUTSIDE LABS:  CBC:   Lab Results   Component Value Date    WBC 4.9 2019    WBC 4.9 2018    HGB 13.8 2019    HGB 14.0 2018    HCT 41.9 2019    HCT 42.5 2018     2019     2018     BMP:   Lab Results   Component Value Date     2019     2018    POTASSIUM 4.0 2019    POTASSIUM 4.1 2018    CHLORIDE 105 2019    CHLORIDE 104 2018    CO2 28 2019    CO2 29 2018    BUN 9 2019    BUN 9 2018    CR 1.03 2019    CR 1.10 2018     (H) 2019    GLC 94 2018     COAGS:   Lab Results    Component Value Date    INR 1.15 (H) 06/06/2019     POC: No results found for: BGM, HCG, HCGS  HEPATIC:   Lab Results   Component Value Date    ALBUMIN 3.5 06/06/2019    PROTTOTAL 7.1 06/06/2019    ALT 27 06/06/2019    AST 21 06/06/2019    ALKPHOS 101 06/06/2019    BILITOTAL 0.7 06/06/2019     OTHER:   Lab Results   Component Value Date    JUDI 8.8 06/06/2019    CRP <2.9 12/18/2018    SED 8 08/20/2018       Anesthesia Plan    ASA Status:  3   NPO Status:  NPO Appropriate    Anesthesia Type: MAC.     - Reason for MAC: straight local not clinically adequate   Induction: Intravenous, Propofol.   Maintenance: TIVA.        Consents    Anesthesia Plan(s) and associated risks, benefits, and realistic alternatives discussed. Questions answered and patient/representative(s) expressed understanding.    - Discussed:     - Discussed with:  Patient      - Extended Intubation/Ventilatory Support Discussed: No.      - Patient is DNR/DNI Status: No    Use of blood products discussed: No .     Postoperative Care       PONV prophylaxis: Ondansetron (or other 5HT-3), Background Propofol Infusion     Comments:                Roger Garcia MD

## 2023-06-14 ENCOUNTER — ANESTHESIA (OUTPATIENT)
Dept: SURGERY | Facility: AMBULATORY SURGERY CENTER | Age: 33
End: 2023-06-14
Payer: COMMERCIAL

## 2023-06-14 ENCOUNTER — HOSPITAL ENCOUNTER (OUTPATIENT)
Facility: AMBULATORY SURGERY CENTER | Age: 33
Discharge: HOME | End: 2023-06-14
Attending: INTERNAL MEDICINE
Payer: COMMERCIAL

## 2023-06-14 VITALS
OXYGEN SATURATION: 100 % | TEMPERATURE: 97.5 F | SYSTOLIC BLOOD PRESSURE: 115 MMHG | DIASTOLIC BLOOD PRESSURE: 80 MMHG | HEIGHT: 75 IN | HEART RATE: 73 BPM | RESPIRATION RATE: 18 BRPM | WEIGHT: 230 LBS | BODY MASS INDEX: 28.6 KG/M2

## 2023-06-14 VITALS — HEART RATE: 63 BPM

## 2023-06-14 LAB — COLONOSCOPY: NORMAL

## 2023-06-14 PROCEDURE — 88342 IMHCHEM/IMCYTCHM 1ST ANTB: CPT | Mod: 26 | Performed by: PATHOLOGY

## 2023-06-14 PROCEDURE — 45380 COLONOSCOPY AND BIOPSY: CPT | Mod: 59,33

## 2023-06-14 PROCEDURE — 88305 TISSUE EXAM BY PATHOLOGIST: CPT | Mod: 26 | Performed by: PATHOLOGY

## 2023-06-14 PROCEDURE — 88305 TISSUE EXAM BY PATHOLOGIST: CPT | Mod: TC | Performed by: INTERNAL MEDICINE

## 2023-06-14 PROCEDURE — 45385 COLONOSCOPY W/LESION REMOVAL: CPT | Mod: 33

## 2023-06-14 RX ORDER — PROPOFOL 10 MG/ML
INJECTION, EMULSION INTRAVENOUS CONTINUOUS PRN
Status: DISCONTINUED | OUTPATIENT
Start: 2023-06-14 | End: 2023-06-14

## 2023-06-14 RX ORDER — NALOXONE HYDROCHLORIDE 0.4 MG/ML
0.4 INJECTION, SOLUTION INTRAMUSCULAR; INTRAVENOUS; SUBCUTANEOUS
Status: DISCONTINUED | OUTPATIENT
Start: 2023-06-14 | End: 2023-06-15 | Stop reason: HOSPADM

## 2023-06-14 RX ORDER — NALOXONE HYDROCHLORIDE 0.4 MG/ML
0.2 INJECTION, SOLUTION INTRAMUSCULAR; INTRAVENOUS; SUBCUTANEOUS
Status: DISCONTINUED | OUTPATIENT
Start: 2023-06-14 | End: 2023-06-15 | Stop reason: HOSPADM

## 2023-06-14 RX ORDER — LIDOCAINE HYDROCHLORIDE 20 MG/ML
INJECTION, SOLUTION INFILTRATION; PERINEURAL PRN
Status: DISCONTINUED | OUTPATIENT
Start: 2023-06-14 | End: 2023-06-14

## 2023-06-14 RX ORDER — LIDOCAINE 40 MG/G
CREAM TOPICAL
Status: DISCONTINUED | OUTPATIENT
Start: 2023-06-14 | End: 2023-06-14 | Stop reason: HOSPADM

## 2023-06-14 RX ORDER — ONDANSETRON 2 MG/ML
4 INJECTION INTRAMUSCULAR; INTRAVENOUS EVERY 6 HOURS PRN
Status: DISCONTINUED | OUTPATIENT
Start: 2023-06-14 | End: 2023-06-15 | Stop reason: HOSPADM

## 2023-06-14 RX ORDER — FLUMAZENIL 0.1 MG/ML
0.2 INJECTION, SOLUTION INTRAVENOUS
Status: ACTIVE | OUTPATIENT
Start: 2023-06-14 | End: 2023-06-14

## 2023-06-14 RX ORDER — ONDANSETRON 2 MG/ML
4 INJECTION INTRAMUSCULAR; INTRAVENOUS
Status: DISCONTINUED | OUTPATIENT
Start: 2023-06-14 | End: 2023-06-14 | Stop reason: HOSPADM

## 2023-06-14 RX ORDER — ONDANSETRON 4 MG/1
4 TABLET, ORALLY DISINTEGRATING ORAL EVERY 6 HOURS PRN
Status: DISCONTINUED | OUTPATIENT
Start: 2023-06-14 | End: 2023-06-15 | Stop reason: HOSPADM

## 2023-06-14 RX ORDER — SODIUM CHLORIDE, SODIUM LACTATE, POTASSIUM CHLORIDE, CALCIUM CHLORIDE 600; 310; 30; 20 MG/100ML; MG/100ML; MG/100ML; MG/100ML
INJECTION, SOLUTION INTRAVENOUS CONTINUOUS PRN
Status: DISCONTINUED | OUTPATIENT
Start: 2023-06-14 | End: 2023-06-14

## 2023-06-14 RX ORDER — PROCHLORPERAZINE MALEATE 10 MG
10 TABLET ORAL EVERY 6 HOURS PRN
Status: DISCONTINUED | OUTPATIENT
Start: 2023-06-14 | End: 2023-06-15 | Stop reason: HOSPADM

## 2023-06-14 RX ADMIN — LIDOCAINE HYDROCHLORIDE 60 MG: 20 INJECTION, SOLUTION INFILTRATION; PERINEURAL at 08:07

## 2023-06-14 RX ADMIN — PROPOFOL 350 MCG/KG/MIN: 10 INJECTION, EMULSION INTRAVENOUS at 08:05

## 2023-06-14 RX ADMIN — SODIUM CHLORIDE, SODIUM LACTATE, POTASSIUM CHLORIDE, CALCIUM CHLORIDE: 600; 310; 30; 20 INJECTION, SOLUTION INTRAVENOUS at 08:00

## 2023-06-14 NOTE — ANESTHESIA CARE TRANSFER NOTE
Patient: Uriah Mortensen    Procedure: Procedure(s):  COLONOSCOPY, WITH CHROMOENDOSCOPY,   POLYPECTOMY AND BIOPSY       Diagnosis: Ulcerative pancolitis without complication (H) [K51.00]  Diagnosis Additional Information: No value filed.    Anesthesia Type:   MAC     Note:    Oropharynx: oropharynx clear of all foreign objects and spontaneously breathing  Level of Consciousness: awake  Oxygen Supplementation: room air    Independent Airway: airway patency satisfactory and stable  Dentition: dentition unchanged  Vital Signs Stable: post-procedure vital signs reviewed and stable  Report to RN Given: handoff report given  Patient transferred to: Phase II  Comments: Report to Phase II RN. Resps easy and regular.   Handoff Report: Identifed the Patient, Identified the Reponsible Provider, Reviewed the pertinent medical history, Discussed the surgical course, Reviewed Intra-OP anesthesia mangement and issues during anesthesia, Set expectations for post-procedure period and Allowed opportunity for questions and acknowledgement of understanding      Vitals:  Vitals /Value Taken Time   /54    Temp 97.5    Pulse 67    Resp 12    SpO2 96%        Electronically Signed By: ABRAHAM CALLE CRNA  June 14, 2023  9:05 AM

## 2023-06-14 NOTE — ANESTHESIA POSTPROCEDURE EVALUATION
Patient: Uriah Mortensen    Procedure: Procedure(s):  COLONOSCOPY, WITH CHROMOENDOSCOPY,   POLYPECTOMY AND BIOPSY       Anesthesia Type:  MAC    Note:  Disposition: Outpatient   Postop Pain Control: Uneventful            Sign Out: Well controlled pain   PONV: No   Neuro/Psych: Uneventful            Sign Out: Acceptable/Baseline neuro status   Airway/Respiratory: Uneventful            Sign Out: Acceptable/Baseline resp. status   CV/Hemodynamics: Uneventful            Sign Out: Acceptable CV status; No obvious hypovolemia; No obvious fluid overload   Other NRE:    DID A NON-ROUTINE EVENT OCCUR?            Last vitals:  Vitals Value Taken Time   /80 06/14/23 0940   Temp 36.4  C (97.5  F) 06/14/23 0900   Pulse 73 06/14/23 0940   Resp 18 06/14/23 0940   SpO2 100 % 06/14/23 0940       Electronically Signed By: Roger Garcia MD  June 14, 2023  9:45 AM

## 2023-06-14 NOTE — INTERVAL H&P NOTE
"I have reviewed the surgical (or preoperative) H&P that is linked to this encounter, and examined the patient. There are no significant changes    Clinical Conditions Present on Arrival:  Clinically Significant Risk Factors Present on Admission                  # Overweight: Estimated body mass index is 28.75 kg/m  as calculated from the following:    Height as of this encounter: 1.905 m (6' 3\").    Weight as of this encounter: 104.3 kg (230 lb).       "

## 2023-06-14 NOTE — H&P
Uriah Mortensen  [id number]  male  33 year old      Reason for procedure/surgery: Colitis     Patient Active Problem List   Diagnosis     PSC (primary sclerosing cholangitis)     IBD (inflammatory bowel disease)     Celiac disease     Ulcerative colitis (H)       Past Surgical History:    Past Surgical History:   Procedure Laterality Date     COLONOSCOPY N/A 6/5/2019    Procedure: COLONOSCOPY, WITH BIOPSY;  Surgeon: Jasen Watkins MD;  [facility]       Past Medical History:   Past Medical History:   Diagnosis Date     Celiac disease      PSC (primary sclerosing cholangitis)      Ulcerative colitis (H)        Social History:   Social History     Tobacco Use     Smoking status: Never     Smokeless tobacco: Never   Vaping Use     Vaping status: Not on file   Substance Use Topics     Alcohol use: No     Alcohol/week: 0.0 standard drinks of alcohol     Comment: None       Family History:   Family History   Problem Relation Age of Onset     Hypertension Father      Lupus Paternal Grandmother      Colon Cancer Paternal Grandfather        Allergies:   Allergies   Allergen Reactions     Fish-Derived Products Hives     Positive skin test to fish and shellfish on 9/14/2016.     Shellfish Allergy Hives, Itching and Shortness Of Breath     Gluten Meal        Active Medications:   Current Outpatient Medications   Medication Sig Dispense Refill     adalimumab (HUMIRA *CF*) 40 MG/0.4ML pen kit Inject 0.4 mLs (40 mg) Subcutaneous every 7 days More refills after labs in February and clinic appt scheduled 4 each 3     azaTHIOprine (IMURAN) 50 MG tablet TAKE 3 AND 1/2 TABLETS(175 MG) BY MOUTH DAILY 315 tablet 0     mesalamine (LIALDA) 1.2 g DR tablet Take 4 tablets (4,800 mg) by mouth daily (with breakfast) 360 tablet 0     EPINEPHrine (EPIPEN 2-NAGI) 0.3 MG/0.3ML injection 2-pack Inject when needed for emergency treatment of severe allergic reactions (anaphylaxis).         Systemic Review:   CONSTITUTIONAL: NEGATIVE for  "fever, chills, change in weight  ENT/MOUTH: NEGATIVE for ear, mouth and throat problems  RESP: NEGATIVE for significant cough or SOB  CV: NEGATIVE for chest pain, palpitations or peripheral edema    Physical Examination:   Vital Signs: /82 (BP Location: Right arm)   Pulse 83   Temp 97.7  F (36.5  C) (Temporal)   Resp 18   Ht 1.905 m (6' 3\")   Wt 104.3 kg (230 lb)   SpO2 95%   BMI 28.75 kg/m    GENERAL: healthy, alert and no distress  NECK: no adenopathy, no asymmetry, masses, or scars  RESP: lungs clear to auscultation - no rales, rhonchi or wheezes  CV: regular rate and rhythm, normal S1 S2, no S3 or S4, no murmur, click or rub, no peripheral edema and peripheral pulses strong  ABDOMEN: soft, nontender, no hepatosplenomegaly, no masses and bowel sounds normal  MS: no gross musculoskeletal defects noted, no edema    Plan: Appropriate to proceed as scheduled.      Jasen Watkins MD  6/14/2023    PCP:  Jasen Watkins  "

## 2023-06-15 LAB
PATH REPORT.COMMENTS IMP SPEC: NORMAL
PATH REPORT.FINAL DX SPEC: NORMAL
PATH REPORT.GROSS SPEC: NORMAL
PATH REPORT.MICROSCOPIC SPEC OTHER STN: NORMAL
PATH REPORT.RELEVANT HX SPEC: NORMAL
PHOTO IMAGE: NORMAL

## 2023-07-17 ENCOUNTER — TELEPHONE (OUTPATIENT)
Dept: GASTROENTEROLOGY | Facility: CLINIC | Age: 33
End: 2023-07-17

## 2023-07-17 NOTE — TELEPHONE ENCOUNTER
Premar received a message from OLIVERIO LOCO to help get Pt scheduled for a sooner appointment. Writer called and left message, along with call back number for the Pt.

## 2023-07-30 DIAGNOSIS — K51.00 ULCERATIVE PANCOLITIS WITHOUT COMPLICATION (H): ICD-10-CM

## 2023-08-31 ENCOUNTER — MYC MEDICAL ADVICE (OUTPATIENT)
Dept: GASTROENTEROLOGY | Facility: CLINIC | Age: 33
End: 2023-08-31
Payer: COMMERCIAL

## 2023-08-31 DIAGNOSIS — K51.00 ULCERATIVE PANCOLITIS WITHOUT COMPLICATION (H): Primary | ICD-10-CM

## 2023-08-31 NOTE — TELEPHONE ENCOUNTER
Lab orders updated to ordering provider: Ronak Bergman. Patient due for TB Quantiferon Gold and Hepatitis B serologies, orders placed.     Inbasket message sent to Ms. Mendoza requesting all orders be faxed to patient's preferred location: Labs sent to [facility] @ Fax number [phone].

## 2023-08-31 NOTE — TELEPHONE ENCOUNTER
Patient noted to be overdue for labs. Last routine labs drawn in February. Clinic visit scheduled with Ronak on 11/22. Instructed patient to complete lab draw prior to refills being sent.

## 2023-09-05 ENCOUNTER — TELEPHONE (OUTPATIENT)
Dept: GASTROENTEROLOGY | Facility: CLINIC | Age: 33
End: 2023-09-05
Payer: COMMERCIAL

## 2023-09-05 NOTE — TELEPHONE ENCOUNTER
Printed and faxed future and standing lab orders to [facility] at [phone]  -Hepatis B core  - Hepatis B surface antibody  - Hepatis B surface antigen  - TB  - CBC  -CRP  - ESR  - Hepatic     Confirmed in Care everywhere they received all lab orders except TB. TB lab order re-faxed to [phone].    Katherine Mendoza LPN

## 2023-09-08 NOTE — TELEPHONE ENCOUNTER
September 8, 2023    Called [facility], Left message and contact info to return call regarding: receipt of faxed lab orders    [facility] in [place], [place]  (O) [phone]  Fax#[phone].    [facility] Team,  Please call on Monday 9/11/23 to confirm if they received the faxed lab orders?    Please let RN know.    Thank you.  Gurmeet

## 2023-09-11 NOTE — TELEPHONE ENCOUNTER
September 11, 2023    Called [facility], Left message and contact info to return call regarding: receipt of faxed lab orders     Facility Information:  [facility] in [place], [place]  Office #: [phone]  Fax #: [phone].       SK

## 2023-09-12 ENCOUNTER — DOCUMENTATION ONLY (OUTPATIENT)
Dept: GASTROENTEROLOGY | Facility: CLINIC | Age: 33
End: 2023-09-12
Payer: COMMERCIAL

## 2023-09-12 ENCOUNTER — MYC MEDICAL ADVICE (OUTPATIENT)
Dept: GASTROENTEROLOGY | Facility: CLINIC | Age: 33
End: 2023-09-12
Payer: COMMERCIAL

## 2023-09-12 DIAGNOSIS — K51.00 ULCERATIVE PANCOLITIS WITHOUT COMPLICATION (H): ICD-10-CM

## 2023-09-12 NOTE — PROGRESS NOTES
Called [facility] lab department and confirmed receipt of faxed orders.     Labs ordered by Ronak Bergman PA-C:  -- Hepatitis B core antibody  -- Hepatitis B Surface Antibody  -- Hepatitis B surface antigen  -- Quantiferon TB Gold Plus  -- CBC with platelets differential  -- CRP inflammation  -- Erythrocyte sedimentation rate auto  -- Hepatic panel     Facility Information:  [address] [place] [address]  Phone #: [phone]  Fax #: [phone]  Lab Fax #: [phone]        SK

## 2023-09-12 NOTE — PROGRESS NOTES
Called [facility] lab department who denied receipt of faxed orders. Confirmed fax number. Urgently re-faxed orders.    Requested call upon receipt of orders. Will follow-up as needed.    Labs ordered by Ronak Bergman PA-C:  -- Hepatitis B core antibody  -- Hepatitis B Surface Antibody  -- Hepatitis B surface antigen  -- Quantiferon TB Gold Plus  -- CBC with platelets differential  -- CRP inflammation  -- Erythrocyte sedimentation rate auto  -- Hepatic panel    Facility Information:  [address] [place] [address]  Phone #: [phone]  Fax #: [phone]  Lab Fax #: [phone]      SK

## 2023-09-12 NOTE — TELEPHONE ENCOUNTER
Called [facility] lab department who denied receipt of faxed orders. Confirmed fax number. Urgently re-faxed orders.     Requested call upon receipt of orders. Will follow-up as needed.     Labs ordered by Ronak Bergman PA-C:  -- Hepatitis B core antibody  -- Hepatitis B Surface Antibody  -- Hepatitis B surface antigen  -- Quantiferon TB Gold Plus  -- CBC with platelets differential  -- CRP inflammation  -- Erythrocyte sedimentation rate auto  -- Hepatic panel     Facility Information:  [address] [place] [address]  Phone #: [phone]  Fax #: [phone]  Lab Fax #: [phone]        SK

## 2023-09-12 NOTE — TELEPHONE ENCOUNTER
Called [facility] lab department and confirmed receipt of faxed orders.     Labs ordered by Ronak Bergman PA-C:  -- Hepatitis B core antibody  -- Hepatitis B Surface Antibody  -- Hepatitis B surface antigen  -- Quantiferon TB Gold Plus  -- CBC with platelets differential  -- CRP inflammation  -- Erythrocyte sedimentation rate auto  -- Hepatic panel     Facility Information:  [address] [place] [address]  Phone #: [phone]  Fax #: [phone]  Lab Fax #: [phone]        SK

## 2023-09-14 RX ORDER — MESALAMINE 1.2 G/1
4800 TABLET, DELAYED RELEASE ORAL
Qty: 120 TABLET | Refills: 2 | Status: SHIPPED | OUTPATIENT
Start: 2023-09-14 | End: 2024-01-15

## 2023-09-14 RX ORDER — AZATHIOPRINE 50 MG/1
175 TABLET ORAL DAILY
Qty: 315 TABLET | Refills: 0 | Status: SHIPPED | OUTPATIENT
Start: 2023-09-14 | End: 2024-01-22

## 2023-09-14 RX ORDER — ADALIMUMAB 40MG/0.4ML
KIT SUBCUTANEOUS
Qty: 4 EACH | Refills: 1 | Status: SHIPPED | OUTPATIENT
Start: 2023-09-14 | End: 2023-11-29

## 2023-09-14 NOTE — TELEPHONE ENCOUNTER
Rx Refill Request:   Humira 40mg every 7 days  Last Written Prescription Date: 1/2/23  Last Fill Quantity:  4 each,   # refills: 3    Future Office visit: 11/22/23  Last Office Visit :  10/10/22  Dx: Colitis  Current medication:               - adalimumab weekly (started 12/2018, increased to weekly summer 2019)              - Azathioprine  175 mg daily              - Mesalamine 4.8g per day    TB: NEGATIVE 9/12/23    Standing Lab Orders:  CBC, CRP, ESR, LFTs  Last complete: 9/12/23  Next due: 12/12/23    Refills sent to [facility] to cover until [facility].  Sent reminder via portal message to ask for new prescription at [facility].

## 2023-09-14 NOTE — TELEPHONE ENCOUNTER
Rx Refill Request:  Mesalamine (LIALDA) 1.2 g DR (4 tab PO daily)  Last Written Prescription Date: 6/29/23  Last Fill Quantity:  120 tabs,   # refills: 0    Future Office visit:       11/22/23  Last Office Visit :         10/10/22  Dx: Colitis  Current medication:               - adalimumab weekly (started 12/2018, increased to weekly summer 2019)              - Azathioprine  175 mg daily              - Mesalamine 4.8g per day    Standing Lab Orders expiration:  CBC, CRP, ESR, LFTs  Monitoring Lab completed: 9/12/23  BUN/Creatinine annually: 2/28/23    Refills sent to [facility] to cover until RV.  Sent reminder via portal message to ask for new prescription at [facility].

## 2023-11-06 ENCOUNTER — MYC MEDICAL ADVICE (OUTPATIENT)
Dept: GASTROENTEROLOGY | Facility: CLINIC | Age: 33
End: 2023-11-06
Payer: COMMERCIAL

## 2023-11-06 DIAGNOSIS — K51.00 ULCERATIVE PANCOLITIS WITHOUT COMPLICATION (H): ICD-10-CM

## 2023-11-09 RX ORDER — ADALIMUMAB 40MG/0.4ML
KIT SUBCUTANEOUS
Qty: 4 EACH | OUTPATIENT
Start: 2023-11-09

## 2023-11-09 NOTE — TELEPHONE ENCOUNTER
HUMIRA(CF) PEN 40 MG/0.4 ML       INJECT 40 MG (0.4 ML) UNDER THE SKIN EVERY 7 DAYS   (MORE REFILLS AFTER LAB AND CLINIC APPOINTMENT AS DIRECTED)  Last Written Prescription Date:  9-14-23  Last Fill Quantity: 4 each,   # refills: 1  Last Office Visit: 10-10-22  Future Office visit:  11-22-23        Outside labs;9-12-23  ESR  0 - 15 mm/Hr 3     CRP (mg/dL)  0.0 - 1.0 mg/dL <0.5        Alkaline Phosphatase  40 - 150 U/L 74   AST - SGOT  17 - 59 U/L 37   ALT - SGPT  0 - 50 U/L 26   Bilirubin Total  0.2 - 1.3 mg/dL 0.8   Bilirubin Indirect  0.0 - 1.1 mg/dL 0.7   Bilirubin Direct  0.0 - 0.3 mg/dL 0.1   Albumin  3.5 - 5.0 g/dL 4.3   Protein Total  6.3 - 8.2 g/dL 7.6   [facility]     WBC  4.0 - 11.0 K/uL 5.9   RBC  4.40 - 5.80 M/uL 4.85   Hemoglobin  13.5 - 17.5 g/dL 14.6   Hematocrit  40.0 - 50.0 % 44.0   MCV  80.0 - 98.0 fL 90.7   MCH  25.5 - 34.0 pg 30.1   MCHC  31.5 - 36.5 g/dL 33.2   RDW-CV  11.5 - 15.5 % 13.7   RDW-SD  35.5 - 50.0 fl 46.0   Platelet Count  140 - 400 K/uL 266     Routing refill request to provider for review/approval because:  Last rx: RF after lab and clinic appt  ? Jessie ruiz RTC

## 2023-11-22 ENCOUNTER — VIRTUAL VISIT (OUTPATIENT)
Dept: GASTROENTEROLOGY | Facility: CLINIC | Age: 33
End: 2023-11-22
Payer: COMMERCIAL

## 2023-11-22 ENCOUNTER — PATIENT OUTREACH (OUTPATIENT)
Dept: GASTROENTEROLOGY | Facility: CLINIC | Age: 33
End: 2023-11-22

## 2023-11-22 VITALS — WEIGHT: 230 LBS | BODY MASS INDEX: 28.6 KG/M2 | HEIGHT: 75 IN

## 2023-11-22 DIAGNOSIS — K51.00 ULCERATIVE PANCOLITIS WITHOUT COMPLICATION (H): Primary | ICD-10-CM

## 2023-11-22 PROCEDURE — 99214 OFFICE O/P EST MOD 30 MIN: CPT | Mod: VID | Performed by: PHYSICIAN ASSISTANT

## 2023-11-22 ASSESSMENT — PAIN SCALES - GENERAL: PAINLEVEL: NO PAIN (0)

## 2023-11-22 NOTE — PROGRESS NOTES
[facility]IC SUPPORT,  Please fax labs ordered today by MICHELLE Bergman to   [facility] in [place], [place]  (O) [phone]  Fax#[phone].    Please call to confirm receipt and please send pt message once confirm.    Thank you.  Gurmeet

## 2023-11-22 NOTE — NURSING NOTE
Is the patient currently in the state of [place]? YES    Visit mode:VIDEO    If the visit is dropped, the patient can be reconnected by: VIDEO VISIT: Send to e-mail at: [email or web address]    Will anyone else be joining the visit? NO  (If patient encounters technical issues they should call [phone]    How would you like to obtain your AVS? MyChart    Are changes needed to the allergy or medication list? No    Reason for visit: URBAN KOHLER

## 2023-11-22 NOTE — PROGRESS NOTES
Marie standing and future lab orders    Provider: Ronak Bergman PA-C    Where: [facility] in [place], [place]  (O) [phone]  Fax#[phone].    Lab(s):   - TB  -zinc  -vitamin b12  -vitamin d  -folate  -TIBIC  -transglutaminase  -thiopurine    Confirmed order received:    Katherine Mendoza LPN

## 2023-11-22 NOTE — PROGRESS NOTES
Virtual Visit Details    Type of service:  Video Visit   Video Start Time: 2:32 PM  Video End Time:2:51 PM    Originating Location (pt. Location): Home    Distant Location (provider location):  Off-site  Platform used for Video Visit: Kelly    IBD CLINIC VISIT    CC/REFERRING MD:  Referred Self  REASON FOR CONSULTATION: f/u PSC colitis    ASSESSMENT/PLAN  33 year old man with PSC colitis     1. Colitis  Current medication:    - adalimumab weekly (started 12/2018, increased to weekly summer 2019)   - Azathioprine  175 mg daily   - Mesalamine 4.8g per day  Current clinical disease activity: remission   Current endoscopic disease activity: 6/2023 patchy mild inflammation found in the ascending colon and cecum.  Focal lesion of inflammation/ulceration noted in the cecum that was biopsied, found to have mild to moderate inflammation in this area on biopsies.    He continues to be in clinical remission on weekly adalimumab, azathioprine, mesalamine 4.8 g/day.  He believes that moving to adalimumab weekly have the greatest impact on his symptoms.    We again discussed his treatment regimen and if there might be opportunity to scale back.  In the setting of some areas with mild to moderate inflammation, I would encouraged to maintain his current IBD therapy as is.  We discussed some dietary elements to implement such as the SCD diet to see if this paired with his medications may allow for scaling back his medications.  This may especially be helpful and effective given he is already gluten-free with his celiac disease.    Discussed that his greatest risk long-term was for colorectal cancer and that treating inflammation to normal or mild decrease the risk of colon cancer.  Discussed it would be ideal to continue to monitor him on his current meds, but de-escalating other therapies is certainly reasonable in the future as we continuously reevaluate the risk-benefit profile.    -- Labs: CBC, LFTs, CRP, ESR, thiopurine  metabolites   -- Continue weekly Humira  -- Continue azathiorpine 175mg daily   -- Continue mesalamine 4.8g per day  -- Consider SCD diet    2. PSC  - continue to follow with Dr. Herrera in liver clinic. Due for follow-up.     3. Reported celiac disease  EGD  at [place] does state mucosal changes seen consistent with celiac, however path unavailable. EGD  normal duodenal bxs on gluten free diet.  Celiac serologies normal in [facility] records.  Will have cramping and diarrhea if he accidentally eats gluten.  -- Annual celiac labs    4. Eosinophilic esophagitis  No dysphagia at today's visit.  Unclear if he has EoE or reactive changes related to reflux.  Denies any dysphagia; his father has dysphagia history.  Had > 15 eosinophils/hpf on distal esophageal biopsies 18, but biopsies from mid-esophagus were normal.    -- Does not take PPI  -- Monitor.     5. IBD dysplasia surveillance: Colitis with PSC. Needs an annual dysplasia surveillance   - yearly dysplasia colonoscopy with bx due summer 2024 (ordered)     IBD HISTORY  Age at diagnosis: 23  Extent of disease: Pancolitis   Prior IBD surgeries: None  Prior IBD Medications:  - Vedolizumab (2016 - 2017) stopped due to ongoing inflammation on colonoscopy (not clear if drug/Ab levels were checked)  - Infliximab-developed antibodies (2018)  - Budesonide  - Asacol      DRUG MONITORING  TPMT enzyme activity: 29.4 (18)     6-TGN/6-MMPN levels: NA     Biologic concentration:  Infliximab 18-drug level 0, antibody greater than 200      HPI:   Here today for a follow-up evaluation.      Clinically he is feeling well. He reports normal stools. 2 stools per day, formed stools. No blood in stools. No EIM.    Woody score:   Stool freq: 0 (baseline stools frequency)  Rectal bleedin (None)  PGA: 0 (normal)  Endoscopy: Images unable for review.     sIBDQ:  IBDQ Score Date IBDQ - Total Score  (Higher score better)   4/10/2023  12:04 PM 64   10/10/2022   2:13 PM 62  "  1/5/2022   6:40 AM 63   6/9/2019   1:03 PM 65   12/17/2018   5:34 AM 64   8/18/2018   1:51 PM 65      ROS:    Constitutional, HEENT, cardiovascular, pulmonary, GI, , musculoskeletal, neuro, skin, endocrine and psych systems are negative, except as otherwise noted.    PHYSICAL EXAMINATION:  Constitutional: aaox3, cooperative, pleasant, not dyspneic/diaphoretic, no acute distress  Vitals reviewed: Ht 1.905 m (6' 3\")   Wt 104.3 kg (230 lb)   BMI 28.75 kg/m    Wt:   Wt Readings from Last 2 Encounters:   11/22/23 104.3 kg (230 lb)   06/14/23 104.3 kg (230 lb)      Constitutional - general appearance is well and in no acute distress. Body habitus normal  Eyes - No redness or discharge  Respiratory - No cough, unlabored breathing  Musculoskeletal - range of motion intact: Neck and arms  Skin - No discoloration or lesions  Neurological - No tremors, headaches  Psychiatric - No anxiety, alert & oriented     PERTINENT PAST MEDICAL HISTORY:  Past Medical History:   Diagnosis Date    Celiac disease     PSC (primary sclerosing cholangitis) (H28)     Ulcerative colitis (H)        PREVIOUS SURGERIES:  Past Surgical History:   Procedure Laterality Date    COLONOSCOPY N/A 6/5/2019    Procedure: COLONOSCOPY, WITH BIOPSY;  Surgeon: Jasen Watkins MD;  [facility]    COLONOSCOPY N/A 6/14/2023    Procedure: COLONOSCOPY, WITH CHROMOENDOSCOPY,   POLYPECTOMY AND BIOPSY;  Surgeon: Jasen Watkins MD;  Location: [facility]       ALLERGIES:     Allergies   Allergen Reactions    Fish-Derived Products Hives     Positive skin test to fish and shellfish on 9/14/2016.    Shellfish Allergy Hives, Itching and Shortness Of Breath    Gluten Meal        PERTINENT MEDICATIONS:    Current Outpatient Medications:     azaTHIOprine (IMURAN) 50 MG tablet, Take 3.5 tablets (175 mg) by mouth daily, Disp: 315 tablet, Rfl: 0    EPINEPHrine (EPIPEN 2-NAGI) 0.3 MG/0.3ML injection 2-pack, Inject when needed for emergency treatment of severe " allergic reactions (anaphylaxis)., Disp: , Rfl:     HUMIRA *CF* PEN 40 MG/0.4ML pen kit, INJECT 40 MG (0.4 ML) UNDER THE SKIN EVERY 7 DAYS (MORE REFILLS AFTER LAB IN FEBRUARY AND CLINIC APPT SCHEDULED), Disp: 4 each, Rfl: 1    mesalamine (LIALDA) 1.2 g DR tablet, Take 4 tablets (4,800 mg) by mouth daily (with breakfast), Disp: 120 tablet, Rfl: 2    SOCIAL HISTORY:  Social History     Socioeconomic History    Marital status:      Spouse name: Not on file    Number of children: Not on file    Years of education: Not on file    Highest education level: Not on file   Occupational History    Not on file   Tobacco Use    Smoking status: Never    Smokeless tobacco: Never   Substance and Sexual Activity    Alcohol use: No     Alcohol/week: 0.0 standard drinks of alcohol     Comment: None    Drug use: No    Sexual activity: Not on file   Other Topics Concern    Not on file   Social History Narrative    Not on file     Social Determinants of Health     Financial Resource Strain: Not on file   Food Insecurity: Not on file   Transportation Needs: Not on file   Physical Activity: Not on file   Stress: Not on file   Social Connections: Not on file   Interpersonal Safety: Not on file   Housing Stability: Not on file       FAMILY HISTORY:  Family History   Problem Relation Age of Onset    Hypertension Father     Lupus Paternal Grandmother     Colon Cancer Paternal Grandfather        Past/family/social history reviewed and no changes

## 2023-11-22 NOTE — PATIENT INSTRUCTIONS
It was a pleasure taking care of you today.  I've included a brief summary of our discussion and care plan from today's visit below.  Please review this information with your primary care provider.  ______________________________________________________________________    My recommendations are summarized as follows:    -- Continue humira, azathioprine and mesalamine   -- Labs every 3 months  -- Next endoscopic assessment: Summer 2024  -- Patient with IBD we recommend supplementation vitamin D 1000 units daily and calcium 500 mg twice daily.  -- Vaccines/immunizations to be updated: flu, covid, pneumonia, shingles, tetanus  -- Yearly Dermatology visit for skin check while on immunosuppressive therapy. Can call [phone] to schedule.  -- No NSAIDs (ibuprofen, or anything containing ibuprofen)    For additional resources about inflammatory bowel disease visit http://www.crohnscolitisfoundation.org/    To learn more about Diet and Nutrition in the setting of IBD, check out some of these resources:  https://www.crohnscolitisfoundation.org/diet-and-nutrition/what-should-i-eat  https://www.nimbal.org/  https://ntforibd.org/         Return to GI Clinic in 6 months to review your progress.    ______________________________________________________________________    How do I schedule labs, imaging studies, or procedures that were ordered in clinic today?     Labs: To schedule lab appointment at the [facility], use my chart or call [phone]. If you have a [place] lab closer to home where you are regularly seen you can give them a call.     Procedures: If a colonoscopy, upper endoscopy, breath test, esophageal manometry, or pH impedence was ordered today, our endoscopy team will call you to schedule this. If you have not heard from our endoscopy team within a week, please call [phone] to schedule.     Imaging Studies: If you were scheduled for a CT scan, X-ray, MRI, ultrasound, HIDA scan or other  imaging study, please call [phone] to have this scheduled.     Referral: If a referral to another specialty was ordered, expect a phone call or follow instructions above. If you have not heard from anyone regarding your referral in a week, please call our clinic to check the status.     Who do I call with any questions after my visit?  Please be in touch if there are any further questions that arise following today's visit.  There are multiple ways to contact your gastroenterology care team.      During business hours, you may reach a Gastroenterology nurse at [phone]    To schedule or reschedule an appointment, please call [phone].     You can always send a secure message through [employer].  [employer] messages are answered by your nurse or doctor typically within 24 hours.  Please allow extra time on weekends and holidays.      For urgent/emergent questions after business hours, you may reach the on-call GI Fellow by contacting the [facility]  at [phone].     How will I get the results of any tests ordered?    You will receive all of your results.  If you have signed up for [employer], any tests ordered at your visit will be available to you after your physician reviews them.  Typically this takes 1-2 weeks.  If there are urgent results that require a change in your care plan, your physician or nurse will call you to discuss the next steps.      What is [employer]?  [employer] is a secure way for you to access all of your healthcare records from the [facility].  It is a web based computer program, so you can sign on to it from any location.  It also allows you to send secure messages to your care team.  I recommend signing up for [employer] access if you have not already done so and are comfortable with using a computer.         Sincerely,    Ronak Bergman PA-C  [facility]  Division of Gastroenterology

## 2023-11-22 NOTE — LETTER
11/22/2023         RE: Uriah Mortensen  [address] [place] [place] [place] [address]        Dear Colleague,    Thank you for referring your patient, Uriah Mortensen, to the [facility] [place]. Please see a copy of my visit note below.    IBD CLINIC VISIT    CC/REFERRING MD:  Referred Self  REASON FOR CONSULTATION: f/u PSC colitis    ASSESSMENT/PLAN  33 year old man with PSC colitis     1. Colitis  Current medication:    - adalimumab weekly (started 12/2018, increased to weekly summer 2019)   - Azathioprine  175 mg daily   - Mesalamine 4.8g per day  Current clinical disease activity: remission   Current endoscopic disease activity: 6/2023 patchy mild inflammation found in the ascending colon and cecum.  Focal lesion of inflammation/ulceration noted in the cecum that was biopsied, found to have mild to moderate inflammation in this area on biopsies.    He continues to be in clinical remission on weekly adalimumab, azathioprine, mesalamine 4.8 g/day.  He believes that moving to adalimumab weekly have the greatest impact on his symptoms.    We again discussed his treatment regimen and if there might be opportunity to scale back.  In the setting of some areas with mild to moderate inflammation, I would encouraged to maintain his current IBD therapy as is.  We discussed some dietary elements to implement such as the SCD diet to see if this paired with his medications may allow for scaling back his medications.  This may especially be helpful and effective given he is already gluten-free with his celiac disease.    Discussed that his greatest risk long-term was for colorectal cancer and that treating inflammation to normal or mild decrease the risk of colon cancer.  Discussed it would be ideal to continue to monitor him on his current meds, but de-escalating other therapies is certainly reasonable in the future as we continuously reevaluate the risk-benefit profile.    -- Labs: CBC, LFTs,  CRP, ESR, thiopurine metabolites   -- Continue weekly Humira  -- Continue azathiorpine 175mg daily   -- Continue mesalamine 4.8g per day  -- Consider SCD diet    2. PSC  - continue to follow with Dr. Herrera in liver clinic. Due for follow-up.     3. Reported celiac disease  EGD  at [place] does state mucosal changes seen consistent with celiac, however path unavailable. EGD  normal duodenal bxs on gluten free diet.  Celiac serologies normal in [facility] records.  Will have cramping and diarrhea if he accidentally eats gluten.  -- Annual celiac labs    4. Eosinophilic esophagitis  No dysphagia at today's visit.  Unclear if he has EoE or reactive changes related to reflux.  Denies any dysphagia; his father has dysphagia history.  Had > 15 eosinophils/hpf on distal esophageal biopsies 18, but biopsies from mid-esophagus were normal.    -- Does not take PPI  -- Monitor.     5. IBD dysplasia surveillance: Colitis with PSC. Needs an annual dysplasia surveillance   - yearly dysplasia colonoscopy with bx due summer 2024 (ordered)     IBD HISTORY  Age at diagnosis: 23  Extent of disease: Pancolitis   Prior IBD surgeries: None  Prior IBD Medications:  - Vedolizumab (2016 - 2017) stopped due to ongoing inflammation on colonoscopy (not clear if drug/Ab levels were checked)  - Infliximab-developed antibodies (2018)  - Budesonide  - Asacol      DRUG MONITORING  TPMT enzyme activity: 29.4 (18)     6-TGN/6-MMPN levels: NA     Biologic concentration:  Infliximab 18-drug level 0, antibody greater than 200      HPI:   Here today for a follow-up evaluation.      Clinically he is feeling well. He reports normal stools. 2 stools per day, formed stools. No blood in stools. No EIM.    Woody score:   Stool freq: 0 (baseline stools frequency)  Rectal bleedin (None)  PGA: 0 (normal)  Endoscopy: Images unable for review.     sIBDQ:  IBDQ Score Date IBDQ - Total Score  (Higher score better)   4/10/2023  12:04 PM 64  "  10/10/2022   2:13 PM 62   1/5/2022   6:40 AM 63   6/9/2019   1:03 PM 65   12/17/2018   5:34 AM 64   8/18/2018   1:51 PM 65      ROS:    Constitutional, HEENT, cardiovascular, pulmonary, GI, , musculoskeletal, neuro, skin, endocrine and psych systems are negative, except as otherwise noted.    PHYSICAL EXAMINATION:  Constitutional: aaox3, cooperative, pleasant, not dyspneic/diaphoretic, no acute distress  Vitals reviewed: Ht 1.905 m (6' 3\")   Wt 104.3 kg (230 lb)   BMI 28.75 kg/m    Wt:   Wt Readings from Last 2 Encounters:   11/22/23 104.3 kg (230 lb)   06/14/23 104.3 kg (230 lb)      Constitutional - general appearance is well and in no acute distress. Body habitus normal  Eyes - No redness or discharge  Respiratory - No cough, unlabored breathing  Musculoskeletal - range of motion intact: Neck and arms  Skin - No discoloration or lesions  Neurological - No tremors, headaches  Psychiatric - No anxiety, alert & oriented     PERTINENT PAST MEDICAL HISTORY:  Past Medical History:   Diagnosis Date    Celiac disease     PSC (primary sclerosing cholangitis) (H28)     Ulcerative colitis (H)        PREVIOUS SURGERIES:  Past Surgical History:   Procedure Laterality Date    COLONOSCOPY N/A 6/5/2019    Procedure: COLONOSCOPY, WITH BIOPSY;  Surgeon: Jasen Watkins MD;  [facility]    COLONOSCOPY N/A 6/14/2023    Procedure: COLONOSCOPY, WITH CHROMOENDOSCOPY,   POLYPECTOMY AND BIOPSY;  Surgeon: Jasen Watkins MD;  Location: [facility] OR       ALLERGIES:     Allergies   Allergen Reactions    Fish-Derived Products Hives     Positive skin test to fish and shellfish on 9/14/2016.    Shellfish Allergy Hives, Itching and Shortness Of Breath    Gluten Meal        PERTINENT MEDICATIONS:    Current Outpatient Medications:     azaTHIOprine (IMURAN) 50 MG tablet, Take 3.5 tablets (175 mg) by mouth daily, Disp: 315 tablet, Rfl: 0    EPINEPHrine (EPIPEN 2-NAGI) 0.3 MG/0.3ML injection 2-pack, Inject when needed for emergency " treatment of severe allergic reactions (anaphylaxis)., Disp: , Rfl:     HUMIRA *CF* PEN 40 MG/0.4ML pen kit, INJECT 40 MG (0.4 ML) UNDER THE SKIN EVERY 7 DAYS (MORE REFILLS AFTER LAB IN FEBRUARY AND CLINIC APPT SCHEDULED), Disp: 4 each, Rfl: 1    mesalamine (LIALDA) 1.2 g DR tablet, Take 4 tablets (4,800 mg) by mouth daily (with breakfast), Disp: 120 tablet, Rfl: 2    SOCIAL HISTORY:  Social History     Socioeconomic History    Marital status:      Spouse name: Not on file    Number of children: Not on file    Years of education: Not on file    Highest education level: Not on file   Occupational History    Not on file   Tobacco Use    Smoking status: Never    Smokeless tobacco: Never   Substance and Sexual Activity    Alcohol use: No     Alcohol/week: 0.0 standard drinks of alcohol     Comment: None    Drug use: No    Sexual activity: Not on file   Other Topics Concern    Not on file   Social History Narrative    Not on file     Social Determinants of Health     Financial Resource Strain: Not on file   Food Insecurity: Not on file   Transportation Needs: Not on file   Physical Activity: Not on file   Stress: Not on file   Social Connections: Not on file   Interpersonal Safety: Not on file   Housing Stability: Not on file       FAMILY HISTORY:  Family History   Problem Relation Age of Onset    Hypertension Father     Lupus Paternal Grandmother     Colon Cancer Paternal Grandfather        Past/family/social history reviewed and no changes      Again, thank you for allowing me to participate in the care of your patient.      Sincerely,    Ronak Bergman PA-C

## 2023-11-29 ENCOUNTER — MYC REFILL (OUTPATIENT)
Dept: GASTROENTEROLOGY | Facility: CLINIC | Age: 33
End: 2023-11-29
Payer: COMMERCIAL

## 2023-11-29 ENCOUNTER — TELEPHONE (OUTPATIENT)
Dept: GASTROENTEROLOGY | Facility: CLINIC | Age: 33
End: 2023-11-29
Payer: COMMERCIAL

## 2023-11-29 DIAGNOSIS — K51.00 ULCERATIVE PANCOLITIS WITHOUT COMPLICATION (H): ICD-10-CM

## 2023-11-29 NOTE — TELEPHONE ENCOUNTER
1st attempt- LVM and sent mychart    Schedule:  6 mo follow-up appointment with Ronak Bergman (around 5/22/24). RTN IBD, in person or virtual

## 2023-11-30 ENCOUNTER — MYC MEDICAL ADVICE (OUTPATIENT)
Dept: GASTROENTEROLOGY | Facility: CLINIC | Age: 33
End: 2023-11-30
Payer: COMMERCIAL

## 2023-11-30 RX ORDER — ADALIMUMAB 40MG/0.4ML
40 KIT SUBCUTANEOUS
Qty: 4 EACH | Refills: 0 | Status: SHIPPED | OUTPATIENT
Start: 2023-11-30 | End: 2023-12-19

## 2023-11-30 NOTE — TELEPHONE ENCOUNTER
Rx Refill Request:   Humira 40mg every 7 days  Last Written Prescription Date: 9/14/23  Last Fill Quantity:  4 each,   # refills: 1    Future Office visit: Not scheduled (RTN in 6 months)  Last Office Visit :  11/22/23  Dx: Colitis  Current medication:               - adalimumab weekly (started 12/2018, increased to weekly summer 2019)              - Azathioprine  175 mg daily              - Mesalamine 4.8g per day  -- Labs: CBC, LFTs, CRP, ESR, thiopurine metabolites   -- Continue weekly Humira  -- Continue azathiorpine 175mg daily   -- Continue mesalamine 4.8g per day  -- Consider SCD diet    TB: 9/12/23 NEGATIVE    MTM Referral: ordered  Standing Lab Orders:  CBC, CRP, ESR, LFTs  Last complete: 9/12/23  Next due: 12/12/23    Refills sent to [facility] to cover until [facility].  Sent reminder via portal message to ask for new prescription at [facility].

## 2023-12-01 NOTE — TELEPHONE ENCOUNTER
2nd attempt- LVM and sent mychart  Max attempts reached    Schedule:  6 mo follow-up appointment with Ronak Bergman (around 5/22/24). RTN IBD, in person or virtual

## 2023-12-04 ENCOUNTER — TELEPHONE (OUTPATIENT)
Dept: GASTROENTEROLOGY | Facility: CLINIC | Age: 33
End: 2023-12-04
Payer: COMMERCIAL

## 2023-12-04 NOTE — TELEPHONE ENCOUNTER
MTM referral from: [facility] visit (referral by provider)    MTM referral outreach attempt #2 on December 4, 2023 at 9:29 AM      Outcome: Patient not reachable after several attempts, will route to [facility] Pharmacist/Provider as an FYI.  [facility] scheduling number is .  Thank you for the referral.    Use hbc for the carrier/Plan on the flowsheet      [email or web address] Message Sent    GABRIEL Rocha

## 2023-12-08 NOTE — TELEPHONE ENCOUNTER
Spoke with Mukesh at [facility], she confirm they have Rx for Humira and their team left a VM for pt to call to schedule delivery.    Sent MyC with above information and a request for pt to schedule MTM appt for more refills.

## 2023-12-11 ENCOUNTER — VIRTUAL VISIT (OUTPATIENT)
Dept: GASTROENTEROLOGY | Facility: CLINIC | Age: 33
End: 2023-12-11
Attending: PHYSICIAN ASSISTANT
Payer: COMMERCIAL

## 2023-12-11 DIAGNOSIS — K51.00 ULCERATIVE PANCOLITIS WITHOUT COMPLICATION (H): Primary | ICD-10-CM

## 2023-12-11 NOTE — PROGRESS NOTES
Medication Therapy Management (MTM) Encounter    ASSESSMENT:                            Medication Adherence/Access: No issues identified    Ulcerative Colitis: Uriah would benefit from continuing current regimen to maintain clinical remission, with goal of endoscopic remission. Reviewed health maintenance as noted below. Would recommend the seasonal influenza vaccine based on general guidelines. Prevnar-20 as well as Shingrix are indicated based on IBD on immunosuppression. Per outside serologies, he is no longer immune to hepatitis B, and can consider repeating this series. He is also due for monitoring labs when able.    PLAN:                            Reminder for labs due every three months.  Reminder for the following vaccines:  Seasonal influenza vaccine  Prevnar-20  Shingrix series   Repeat hepatitis B series    Follow-up: 6 months with MTM, sooner if needed    SUBJECTIVE/OBJECTIVE:                          Uriah Mortensen is a 33 year old male contacted via secure video for a follow-up visit from 6/2021.       Reason for visit: Humira maintenance     Allergies/ADRs: Food Allergies -- has an EpiPen  Tobacco: He reports that he has never smoked. He has never used smokeless tobacco.  Alcohol: none    Medication Adherence/Access: no issues reported    Ulcerative Colitis:   Humira 40 mg subcutaneously weekly  Azathiprine 175 mg daily  Mesalamine 4.8 g daily     No reported concerns today. Recently had a visit with Ronak Bergman PA-C.    Current clinical disease activity: remission   Current endoscopic disease activity: 6/2023 patchy mild inflammation found in the ascending colon and cecum.  Focal lesion of inflammation/ulceration noted in the cecum that was biopsied, found to have mild to moderate inflammation in this area on biopsies.    Last provider visit: 11/22/2023 with Ronak Bergman PA-C  Next provider visit: 5/22/2024 with Ronak Bergman PA-C  Last labs completed: 9/2023  Lab frequency: every 3 months   -  standing labs available until 8/2024  Next labs due: now  PDC: 54% (Humira)    IBD Health Care Maintenance:    Vaccinations:  All patients on biologics should avoid live vaccines.    -- Influenza (every year) not on file   -- TdaP (every 10 years) 5/6/2023  -- Pneumococcal Pneumonia    Prevnar-13: not on file    Pneumovax-23: 9/23/2016    Prevnar-20: not on file   -- COVID-19 declines  -- Yearly assessment for latent Tb (verbal screening and exam, PPD or QuantiFERON-Tb testing)      -- negative 9/2023    One time confirmation of immunity or serologies:  -- Hepatitis A (serologies or immunizations) 10/21/2014  -- Hepatitis B (serologies or immunizations) 12/15/2004, 3/3/2005, and 5/4/2005  -- Varicella/Zoster    Varicella history of chickenpox   Zoster not on file     Due to the immunosuppression in this patient, I would not advise administration of live vaccines such as varicella/VZV, intranasal influenza, MMR, or yellow fever vaccine (if traveling).      Pre-Biologic Screening:  -- Hep B Surface Antibody non-reactive 9/2023  -- Hep B Surface Antigen non-reactive 9/2023  -- Hep B Core Antibody non-reactive 9/2023  -- Hep C Antibody not on file    Bone mineral density screening   -- Recommend all patients supplement with calcium and vitamin D    Cancer Screening:  Colon cancer screening:  Colitis with PSC. Needs an annual dysplasia surveillance   - yearly dysplasia colonoscopy with bx due summer 2024 (previously ordered)    Skin cancer screening: Annual visual exam of skin by dermatologist since patient is immunocompromised   -- has not completed, declined dermatology referral     Depression Screening:    PHQ-2 Score:         11/22/2023     2:22 PM 10/10/2022     2:13 PM   PHQ-2 ( 1999 Pfizer)   Q1: Little interest or pleasure in doing things 0 0   Q2: Feeling down, depressed or hopeless 0 0   PHQ-2 Score 0 0   Q1: Little interest or pleasure in doing things  Not at all   Q2: Feeling down, depressed or hopeless  Not  at all   PHQ-2 Score  0       Research:  Are you interested in being contacted about enrollment in clinical research studies? No    Misc:  -- Avoid tobacco use  -- Avoid NSAIDs as there is potentially a 25% chance of causing an IBD flare    ----------------    I spent 15 minutes with this patient today. All changes were made via collaborative practice agreement with Ronak Bergman. A copy of the visit note was provided to the patient's provider(s).    A summary of these recommendations was sent via [employer].    Jordana Khan PharmD, BCACP  MTM Pharmacist   [facility]   Phone: [phone]    Telemedicine Visit Details  Type of service:  Video Conference via [employer]  Start Time:  3:01 PM  End Time: 3:16 PM     Medication Therapy Recommendations  Ulcerative colitis (H)    Current Medication: adalimumab (HUMIRA *CF* PEN) 40 MG/0.4ML pen kit   Rationale: Preventive therapy - Needs additional medication therapy - Indication   Recommendation: Start Medication - Prevnar 20 0.5 ML Prema   Status: Accepted per CPA

## 2023-12-11 NOTE — Clinical Note
12/11/2023         RE: Uriah Mortensen  [address] [place] [facility] [address]        Dear Colleague,    Thank you for referring your patient, Uriah Mortensen, to the [facility]. Please see a copy of my visit note below.    Medication Therapy Management (MTM) Encounter    ASSESSMENT:                            Medication Adherence/Access: {adherencechoices:661337}    ***:   ***    PLAN:                            Reminder for labs due every three months.  Reminder for the following vaccines:  Seasonal influenza vaccine  Prevnar-20  Shingrix series     -- hepatitis B surface antibody    Follow-up: {followuptest2:726069}    SUBJECTIVE/OBJECTIVE:                          Uriah Mortensen is a 33 year old male contacted via secure video for a follow-up visit from 6/2021. {mtmvisitdetails:615207}      Reason for visit: Humira maintenance     Allergies/ADRs: None  Tobacco: He reports that he has never smoked. He has never used smokeless tobacco.  Alcohol: none    Medication Adherence/Access: {fumedadherence:519455}    {MTM SUBJECTIVE (Optional):257872}      ***:   Humira 40 mg subcutaneously  Azathiprine 175 mg daily  Mesalamine 4.8 g daily     IBD Health Care Maintenance:    Vaccinations:  All patients on biologics should avoid live vaccines.    -- Influenza (every year)   -- TdaP (every 10 years) 5/6/2023  -- Pneumococcal Pneumonia    Prevnar-13: not on file    Pneumovax-23: 9/23/2016    Prevnar-20: not on file   -- COVID-19 declineds  -- Yearly assessment for latent Tb (verbal screening and exam, PPD or QuantiFERON-Tb testing)      -- negative     One time confirmation of immunity or serologies:  -- Hepatitis A (serologies or immunizations)  -- Hepatitis B (serologies or immunizations)  -- Varicella/Zoster    Varicella history of chickenpox   Zoster not on file   -- MMR  -- HPV (all aged 18-26)  -- Meningococcal meningitis (all patients at risk for meningitis)--     Due to the  "immunosuppression in this patient, I would not advise administration of live vaccines such as varicella/VZV, intranasal influenza, MMR, or yellow fever vaccine (if traveling).      Pre-Biologic Screening:  -- Hep B Surface Antibody {ResultsSerologies:681019}  -- Hep B Surface Antigen {ResultsSerologies:427071}  -- Hep B Core Antibody {ResultsSerologies:202260}  -- Hep C Antibody {ResultsSerologies:093004}    Bone mineral density screening   -- Recommend all patients supplement with calcium and vitamin D    Cancer Screening:  Colon cancer screening:  Given ***, recommend patient undergo regular dysplasia surveillance   Next dysplasia screening is recommended ***.    Skin cancer screening: Annual visual exam of skin by dermatologist since patient is immunocompromised   -- has not completed, declined MTM referral     Depression Screening:    PHQ-2 Score:         11/22/2023     2:22 PM 10/10/2022     2:13 PM   PHQ-2 ( 1999 Pfizer)   Q1: Little interest or pleasure in doing things 0 0   Q2: Feeling down, depressed or hopeless 0 0   PHQ-2 Score 0 0   Q1: Little interest or pleasure in doing things  Not at all   Q2: Feeling down, depressed or hopeless  Not at all   PHQ-2 Score  0       Research:  Are you interested in being contacted about enrollment in clinical research studies? No    Misc:  -- Avoid tobacco use  -- Avoid NSAIDs as there is potentially a 25% chance of causing an IBD flare        ----------------  {MIR?:890725}    I spent 15 minutes with this patient today{MTMpartdbillingquestion:180035}. { :145876}. A copy of the visit note was provided to the patient's provider(s).    A summary of these recommendations {GIVEN/NOT GIVEN:287997}.    Jordana Khan PharmD, BCACP  MT Pharmacist   [facility]   Phone: [phone]    Telemedicine Visit Details  Type of service:  {[facility]:087137::\"Telephone visit\"}  Start Time:  3:01 PM  End Time: 3:16 PM     Medication Therapy " Recommendations  No medication therapy recommendations to display       Again, thank you for allowing me to participate in the care of your patient.        Sincerely,        Jordana Khan RPH

## 2023-12-14 ENCOUNTER — TELEPHONE (OUTPATIENT)
Dept: GASTROENTEROLOGY | Facility: CLINIC | Age: 33
End: 2023-12-14
Payer: COMMERCIAL

## 2023-12-14 NOTE — TELEPHONE ENCOUNTER
Prior Authorization Approval    Medication: HUMIRA *CF* PEN 40 MG/0.4ML SC PNKT  Authorization Effective Date: 11/14/2023  Authorization Expiration Date: 12/13/2024  Approved Dose/Quantity: 4/28  Reference #: [id number]   Insurance Company: [employer] - Phone [phone] Fax [phone]  Expected CoPay: $    CoPay Card Available:      Financial Assistance Needed:    Which Pharmacy is filling the prescription: [facility] - [address] [place] [address]  Pharmacy Notified:    Patient Notified:  sent [employer] Mobile message

## 2023-12-14 NOTE — TELEPHONE ENCOUNTER
PA Initiation    Medication: HUMIRA *CF* PEN 40 MG/0.4ML SC PNKT  Insurance Company: [employer] - Phone [phone] Fax [phone]  Pharmacy Filling the Rx:    Filling Pharmacy Phone:    Filling Pharmacy Fax:    Start Date: 12/14/2023  [id number]

## 2023-12-19 RX ORDER — ADALIMUMAB 40MG/0.4ML
40 KIT SUBCUTANEOUS
Qty: 4 EACH | Refills: 2 | Status: SHIPPED | OUTPATIENT
Start: 2023-12-19 | End: 2024-06-04

## 2023-12-20 NOTE — PATIENT INSTRUCTIONS
"Recommendations from today's MTM visit:                                                      Reminder for labs due every three months.  Reminder for the following vaccines:  Seasonal influenza vaccine  Prevnar-20  Shingrix series   Repeat hepatitis B series    Follow-up: 6 months with MTM, sooner if needed    It was great speaking with you today.  I value your experience and would be very thankful for your time in providing feedback in our clinic survey. In the next few days, you may receive an email or text message from [facility] [employer] with a link to a survey related to your  clinical pharmacist.\"     To schedule another MTM appointment, please call the clinic directly or you may call the MTM scheduling line at [phone] or toll-free at [phone].     My Clinical Pharmacist's contact information:                                                      Please feel free to contact me with any questions or concerns you have.      Jordana SandovalD, BCACP  MTM Pharmacist   [facility]   Phone: [phone]      "

## 2023-12-25 DIAGNOSIS — K51.00 ULCERATIVE PANCOLITIS WITHOUT COMPLICATION (H): ICD-10-CM

## 2023-12-29 RX ORDER — ADALIMUMAB 40MG/0.4ML
KIT SUBCUTANEOUS
OUTPATIENT
Start: 2023-12-29

## 2024-01-01 NOTE — TELEPHONE ENCOUNTER
Called to follow up if they received lab orders that were faxed over on a mutal patient. They did get the orders awaiting patient to have done.   Mom called in to follow up on previous call (after hours eleazar owens 8/19) and to address new concerns. Mom is requesting to speak with clinical     CB# [phone]

## 2024-01-09 DIAGNOSIS — K51.00 ULCERATIVE PANCOLITIS WITHOUT COMPLICATION (H): ICD-10-CM

## 2024-01-09 DIAGNOSIS — K83.01 PSC (PRIMARY SCLEROSING CHOLANGITIS) (H): Primary | ICD-10-CM

## 2024-01-15 ENCOUNTER — TELEPHONE (OUTPATIENT)
Dept: GASTROENTEROLOGY | Facility: CLINIC | Age: 34
End: 2024-01-15

## 2024-01-15 ENCOUNTER — TELEPHONE (OUTPATIENT)
Dept: GASTROENTEROLOGY | Facility: CLINIC | Age: 34
End: 2024-01-15
Payer: COMMERCIAL

## 2024-01-15 RX ORDER — MESALAMINE 1.2 G/1
TABLET, DELAYED RELEASE ORAL
Qty: 120 TABLET | Refills: 5 | Status: SHIPPED | OUTPATIENT
Start: 2024-01-15

## 2024-01-15 NOTE — LETTER
2024     To:   [employer] and [facility]  Attention: [employer]  [address]  [place] [address]  Phone [phone]  Fax [phone]     RE:   Uriah Mortensen  [address] [place] [address]  : 1990  Policy #: [id number]   Case/Reference: [id number]     To Whom It May Concern,     Below is the clinical summary pertinent to adalimumab (HUMIRA *CF* PEN) 40 MG/0.4ML pen kit, inject one pen every 7 days. We understand that you are denying this medication because its higher than the FDA recommended dosing frequency.     Background   Diagnosis: Ulcerative pancolitis without complication (H) [K51.00]   Current IBD medications:   - adalimumab (HUMIRA *CF* PEN) 40 MG/0.4ML weekly               - Azathioprine  175 mg daily              - Mesalamine 4.8g per day  Previous IBD Therapies:  - Vedolizumab (2016 - 2017) stopped due to ongoing inflammation on colonoscopy (not clear if drug/Ab levels were checked)  - Infliximab-developed antibodies (2018)  - Budesonide  - Asacol   Clinical disease assessment on current medications: remission     Objective measures of inflammation:               - Flex-sig/colonoscopy 2023: - Patchy mild inflammation was found in the ascending                          colon and at the cecum. Chromoscopy performed.                          Biopsied: Random cecal biopsies, random colon at 75,                          and random colon at 70cm. Additional random left colon                          biopsies taken.                          - Focal lesion of inflammation / ulceration was noted                          in the cecum. Biopsied. If dysplastic potentially                          could be removed with EMR through advanced endoscopy.                          - One 3 mm polyp in the transverse colon, removed with                          a cold biopsy forceps. Resected and retrieved.                          - One 3 mm  polyp in the sigmoid colon, removed with a                          cold snare. Resected and retrieved. Clips were placed.                                  CRP Inflammation   Date Value Ref Range Status   12/18/2018 <2.9 0.0 - 8.0 mg/L Final      Rationale for requested therapy  Mr. Mortensen was started on Humira in late 2018, and was escalated to weekly dosing in June of 2019 given evidence of improvement, but continued severe left-sided disease. As noted above, Mr. Mortensen has had other medication trials/failures. He endorses the biggest impact on his symptoms was the ability to escalate to weekly dosing on Humira. He was able to achieve clinical remission on this dosing, with our ultimate goal being endoscopic remission. It is well documented in the literature that weekly adalimumab dosing is both reasonable and efficacious in the setting of moderate to severe ulcerative colitis (see references below).     Mr. Mortensen carries a high risk of colon cancer given he also carries a diagnosis of PSC. We therefore feel it is in his best interest to continue with his current medication therapies at this time. We ask for your timely determination in this matter, as to not delay care for Mr. Mortensen.     Duration  12 months     Summary  In summary, adalimumab (HUMIRA *CF* PEN) 40 MG/0.4ML pen kit, inject one pen every 7 days is medically necessary for this patient s medical condition. Please call my office at [phone] if I can provide you with any additional information to approve my request. I look forward to receiving your timely response and approval of this request.      Sincerely,     Jordana SandovalD, BCACP  MTM Pharmacist   [facility]   Phone: [phone]     On behalf of Ronak Bergman PA-C     References  1. Michael et al. Adalimumab induces and maintains clinical remission in patients with moderate-to-severe ulcerative colitis. Gastroenterology. 2012;142(2):257-65     2. Juan Jose acuna  al. Incidence and Predictors of Success of Adalimumab Dose Escalation and De-escalation in Ulcerative Colitis: a Real-World [place] Cohort Study. J Crohn s Colitis. 2013;7(2):154-60     3. Teresa et al. Adalimumab Maintenance Treatment in Ulcerative Colitis: Outcomes by Prior Anti-TNF Use and Efficacy of Dose Escalation. Dig Dis Sci. 2017;62(2):481-490     4. Mahin D, Arthur G, Michael RUEDAJ, Michael CORNEJO, Van Stanislav G, Chay AM, Nagi A, Yuriy Q, Dev J, Simone RB. Escalation to weekly dosing recaptures response in adalimumab-treated patients with moderately to severely active ulcerative colitis. Aliment Pharmacol Ther. 2014 Sep;40(5):486-97. doi: 10.1111/apt.79625. Epub 2014 Jul 15. PMID: 15552077.

## 2024-01-15 NOTE — LETTER
2024    To:   [employer]    RE:   Uriah Mortensen  [address] [place] [address]  : 1990  Policy #: [id number]   Case/Reference: [id number]    To Whom It May Concern,    Below is the clinical summary pertinent to adalimumab (HUMIRA *CF* PEN) 40 MG/0.4ML pen kit, inject one pen every 7 days. We understand that you want clinical trials, clinical practice guidelines, and Phase III studies to support this dosing.    Background   Diagnosis: Ulcerative pancolitis without complication (H) [K51.00]   Current IBD medications:   - adalimumab (HUMIRA *CF* PEN) 40 MG/0.4ML weekly    - Azathioprine  175 mg daily              - Mesalamine 4.8g per day  Previous IBD Therapies:  - Vedolizumab (2016 - 2017) stopped due to ongoing inflammation on colonoscopy (not clear if drug/Ab levels were checked)  - Infliximab-developed antibodies (2018)  - Budesonide  - Asacol   Clinical disease assessment on current medications: remission    Objective measures of inflammation:    - Flex-sig/colonoscopy 2023: - Patchy mild inflammation was found in the ascending                          colon and at the cecum. Chromoscopy performed.                          Biopsied: Random cecal biopsies, random colon at 75,                          and random colon at 70cm. Additional random left colon                          biopsies taken.                          - Focal lesion of inflammation / ulceration was noted                          in the cecum. Biopsied. If dysplastic potentially                          could be removed with EMR through advanced endoscopy.                          - One 3 mm polyp in the transverse colon, removed with                          a cold biopsy forceps. Resected and retrieved.                          - One 3 mm polyp in the sigmoid colon, removed with a                          cold snare. Resected and retrieved. Clips were placed.                             CRP Inflammation   Date Value Ref Range Status   12/18/2018 <2.9 0.0 - 8.0 mg/L Final      Rationale for requested therapy  Mr. Mortensen was started on Humira in late 2018, and was escalated to weekly dosing in June of 2019 given evidence of improvement, but continued severe left-sided disease. As noted above, Mr. Mortensen has had other medication trials/failures. He endorses the biggest impact on his symptoms was the ability to escalate to weekly dosing on Humira. He was able to achieve clinical remission on this dosing, with our ultimate goal being endoscopic remission. It is well documented in the literature that weekly adalimumab dosing is both reasonable and efficacious in the setting of moderate to severe ulcerative colitis (see references below).    Mr. Mortensen carries a high risk of colon cancer given he also carries a diagnosis of PSC. We therefore feel it is in his best interest to continue with his current medication therapies at this time. We ask for your timely determination in this matter, as to not delay care for Mr. Mortensen.    Duration  12 months    Summary  In summary, adalimumab (HUMIRA *CF* PEN) 40 MG/0.4ML pen kit, inject one pen every 7 days is medically necessary for this patient s medical condition. Please call my office at [phone] if I can provide you with any additional information to approve my request. I look forward to receiving your timely response and approval of this request.     Sincerely,    Jordana Khan PharmD, BCACP  MTM Pharmacist   [facility]   Phone: [phone]    On behalf of Ronak Bergman PA-C    References  1. Michael et al. Adalimumab induces and maintains clinical remission in patients with moderate-to-severe ulcerative colitis. Gastroenterology. 2012;142(2):257-65    2. Juan Jose et al. Incidence and Predictors of Success of Adalimumab Dose Escalation and De-escalation in Ulcerative Colitis: a Real-World [place] Cohort Study. J Crohn s  Colitis. 2013;7(2):154-60    3. Teresa et al. Adalimumab Maintenance Treatment in Ulcerative Colitis: Outcomes by Prior Anti-TNF Use and Efficacy of Dose Escalation. Dig Dis Sci. 2017;62(2):481-490    4. Mahin D, QUAN'Pavel G, Michael RUEDAJ, Michael CORNEJO, Van Stanislav G, Chay AM, Nagi A, Yuriy Q, Dev J, Simone RB. Escalation to weekly dosing recaptures response in adalimumab-treated patients with moderately to severely active ulcerative colitis. Aliment Pharmacol Ther. 2014 Sep;40(5):486-97. doi: 10.1111/apt.80486. Epub 2014 Jul 15. PMID: 58334848.

## 2024-01-15 NOTE — TELEPHONE ENCOUNTER
MICHELLE Bergman,  Please advice any recommendation regarding the ALT above.    Thank you.  Gurmeet

## 2024-01-15 NOTE — TELEPHONE ENCOUNTER
PA Initiation    Medication: HUMIRA *CF* PEN 40 MG/0.4ML SC PNKT  Insurance Company: [facility] - Phone [phone] Fax [phone]  Pharmacy Filling the Rx:    Filling Pharmacy Phone:    Filling Pharmacy Fax:    Start Date: 1/15/2024  [id number]

## 2024-01-15 NOTE — TELEPHONE ENCOUNTER
PA Initiation    Medication: HUMIRA *CF* PEN 40 MG/0.4ML SC PNKT  Insurance Company: [employer] - Phone [phone] Fax [phone]  Pharmacy Filling the Rx:    Filling Pharmacy Phone:    Filling Pharmacy Fax:    Start Date: 1/15/2024  I called to start the qty PA over the phone

## 2024-01-15 NOTE — TELEPHONE ENCOUNTER
Prior Authorization Approval    Medication: HUMIRA *CF* PEN 40 MG/0.4ML SC PNKT  Authorization Effective Date: 12/16/2023  Authorization Expiration Date: 1/14/2025  Approved Dose/Quantity: 4/28  Reference #: [id number]   Insurance Company: [employer] - Phone [phone] Fax [phone]  Expected CoPay: $    CoPay Card Available:      Financial Assistance Needed:    Which Pharm[facility] is filling the prescription[facility]  Pharmacy Notified:    Patient Notified:

## 2024-01-15 NOTE — TELEPHONE ENCOUNTER
mesalamine (LIALDA) 1.2 g DR tablet   120 tablet 2 9/14/2023     Last Office Visit : 11-  Future Office visit:   5-    Aminosalicylate Agents [id number]/09/2024 08:52 AM   Protocol Details Normal Serum ALT on file within past 12 months    Normal GFR on file in past 12 months    Normal Serum Creatinine on file within past 12 months     Lab Test 06/06/19  0853   ALT 27     Lab Test 06/06/19  0853   GFRESTIMATED >90   GFRESTBLACK >90     Lab Test 06/06/19  0853   CR 1.03

## 2024-01-15 NOTE — TELEPHONE ENCOUNTER
Rx Refill Request:  Mesalamine 1.2 g dr tab (4 tab PO in AM)  Last Written Prescription Date: 9/14/23  Last Fill Quantity:  120 tab,   # refills: 2     Future Office visit: 5/22/24  Last Office Visit :  11/22/23  Colitis  Current medication:               - adalimumab weekly (started 12/2018, increased to weekly summer 2019)              - Azathioprine  175 mg daily              - Mesalamine 4.8g per day  -- Labs: CBC, LFTs, CRP, ESR, thiopurine metabolites   -- Continue weekly Humira  -- Continue azathiorpine 175mg daily   -- Continue mesalamine 4.8g per day  -- Consider SCD diet    Standing Lab Orders expiration:  CBC, CRP, LFTs  Monitoring Lab completed: 12/21/23  BUN/Creatinine annually: 3/21/23    TB: 9/12/23 NEGATIVE    Refills sent to [facility] to cover until [facility].  Sent reminder via portal message to ask for new prescription at [facility].

## 2024-01-17 NOTE — TELEPHONE ENCOUNTER
PRIOR AUTHORIZATION DENIED    Medication: HUMIRA *CF* PEN 40 MG/0.4ML SC PNKT  Insurance Company: [facility] - Phone [phone] Fax [phone]  Denial Date: 1/17/2024  Denial Reason(s):      Appeal Information:      Patient Notified:

## 2024-01-18 NOTE — TELEPHONE ENCOUNTER
Medication Appeal Initiation    Medication: HUMIRA *CF* PEN 40 MG/0.4ML SC PNKT  Appeal Start Date:  1/18/2024  Insurance Company: [employer]    Comments:     Faxed appeal letter

## 2024-01-19 ENCOUNTER — TELEPHONE (OUTPATIENT)
Dept: GASTROENTEROLOGY | Facility: CLINIC | Age: 34
End: 2024-01-19
Payer: COMMERCIAL

## 2024-01-22 ENCOUNTER — MYC MEDICAL ADVICE (OUTPATIENT)
Dept: GASTROENTEROLOGY | Facility: CLINIC | Age: 34
End: 2024-01-22
Payer: COMMERCIAL

## 2024-01-22 DIAGNOSIS — K51.00 ULCERATIVE PANCOLITIS WITHOUT COMPLICATION (H): ICD-10-CM

## 2024-01-22 RX ORDER — AZATHIOPRINE 50 MG/1
175 TABLET ORAL DAILY
Qty: 315 TABLET | Refills: 1 | Status: SHIPPED | OUTPATIENT
Start: 2024-01-22

## 2024-01-22 NOTE — TELEPHONE ENCOUNTER
Rx Refill Request:   Azathioprine 50 mg  Last Written Prescription Date: 9/14/23  Last Fill Quantity:  315 tab,   # refills: o    Future Office visit: 5/22/24  Last Office Visit :  11/22/23  Dx: Colitis  Current medication:               - adalimumab weekly (started 12/2018, increased to weekly summer 2019)              - Azathioprine  175 mg daily              - Mesalamine 4.8g per day      Refills sent to [facility] to cover until RV.  Sent reminder via portal message to ask for new prescription at RV.

## 2024-01-24 NOTE — TELEPHONE ENCOUNTER
Per insurance it has been overturned but they still need to write the letter and also send the determination over to [employer], should have done by end of day 1/26

## 2024-01-27 NOTE — TELEPHONE ENCOUNTER
PA Initiation    Medication: MESALAMINE 1.2 G PO TBEC  Insurance Company: [employer] Non-Specialty PA's - Phone [phone] Fax [phone]  Pharmacy Filling the Rx: [id number] - [place], [place] - [address] AT [facility] [address]  Filling Pharmacy Phone: [phone]  Filling Pharmacy Fax: [phone]  Start Date: 1/27/2024

## 2024-01-28 NOTE — TELEPHONE ENCOUNTER
Prior Authorization Not Needed per Insurance    Medication: MESALAMINE 1.2 G PO TBEC  Insurance Company: [employer] Non-Specialty PA's - Phone [phone] Fax [phone]  Expected CoPay: $    Pharmacy Filling the Rx: [id number] - [place], [place] - [facility] [address] AT [facility]  Pharmacy Notified: YES  Patient Notified: **Instructed pharmacy to notify patient when script is ready to /ship.**    Insurance prefers brand name

## 2024-01-29 NOTE — TELEPHONE ENCOUNTER
Order liver referral for PSC and increase ALT level.    Spoke with Uriah and relayed PA message/recommendations.  Patient acknowledged understanding and agree with plan. Answer all questions offered contact information for pt to call with any questions or concerns.    Sent MyC with hepatology contact # if not contacted in 2 days.

## 2024-01-29 NOTE — TELEPHONE ENCOUNTER
Ronak Bergman PA-C   to Me   AP    1/17/24  2:45 PM  Maksim Levi- this patient has PSC. Should see Liver annually.

## 2024-01-30 NOTE — TELEPHONE ENCOUNTER
MEDICATION APPEAL APPROVED    Medication: HUMIRA *CF* PEN 40 MG/0.4ML SC PNKT  Authorization Effective Date: 1/30/2024  Authorization Expiration Date: 1/22/2025  Approved Dose/Quantity: 4/28  Reference #: [id number]   Appeal [employer] Company: [employer]  Expected CoPay: $       CoPay Card Available:    Financial Assistance Needed:    Filling Pharmacy:  [facility] (restricted)  Patient Notified:  yes, left message  Comments:

## 2024-03-01 NOTE — CONFIDENTIAL NOTE
DIAGNOSIS:  PSC (primary sclerosing cholangitis) (H28)    Appt Date:  04.8.2024   NOTES STATUS DETAILS   OFFICE NOTE from referring provider Internal 01.09.2024 Ronak Bergman PA-C    OFFICE NOTES from other specialists     DISCHARGE SUMMARY from hospital     MEDICATION LIST Internal    LIVER BIOSPY (IF APPLICABLE)      PATHOLOGY REPORTS      IMAGING     ENDOSCOPY (IF AVAILABLE)     COLONOSCOPY (IF AVAILABLE) Internal 08.11.2022   ULTRASOUND LIVER     CT OF ABDOMEN     MRI OF LIVER     FIBROSCAN, US ELASTOGRAPHY, FIBROSIS SCAN, MR ELASTOGRAPHY     LABS     HEPATIC PANEL (LIVER PANEL) Care Everywhere 12.21.2023   BASIC METABOLIC PANEL     COMPLETE METABOLIC PANEL Care Everywhere 12.21.2023   COMPLETE BLOOD COUNT (CBC) Care Everywhere 12.21.2023   INTERNATIONAL NORMALIZED RATIO (INR)     HEPATITIS C ANTIBODY     HEPATITIS C VIRAL LOAD/PCR     HEPATITIS C GENOTYPE     HEPATITIS B SURFACE ANTIGEN Care Everywhere 09.12.2023   HEPATITIS B SURFACE ANTIBODY Care Everywhere 09.12.2023   HEPATITIS B DNA QUANT LEVEL     HEPATITIS B CORE ANTIBODY Care Everywhere 09.12.2023

## 2024-03-04 ENCOUNTER — MYC MEDICAL ADVICE (OUTPATIENT)
Dept: GASTROENTEROLOGY | Facility: CLINIC | Age: 34
End: 2024-03-04
Payer: COMMERCIAL

## 2024-03-06 NOTE — TELEPHONE ENCOUNTER
[employer] section 3 and 4 filled, signed and completed form faxed to [employer] at [phone].    Sent MyC message with above information.

## 2024-03-13 ENCOUNTER — MYC MEDICAL ADVICE (OUTPATIENT)
Dept: GASTROENTEROLOGY | Facility: CLINIC | Age: 34
End: 2024-03-13
Payer: COMMERCIAL

## 2024-03-19 ENCOUNTER — TELEPHONE (OUTPATIENT)
Dept: GASTROENTEROLOGY | Facility: CLINIC | Age: 34
End: 2024-03-19
Payer: COMMERCIAL

## 2024-03-19 NOTE — TELEPHONE ENCOUNTER
"Endoscopy Scheduling Screen    Have you had a positive Covid test in the last 14 days?  No    What is your communication preference for Instructions and/or Bowel Prep?   MyChart    What insurance is in the chart?  Other:  BCBS    Ordering/Referring Provider:     BRITTANY HICKS      (If ordering provider performs procedure, schedule with ordering provider unless otherwise instructed. )    BMI: Estimated body mass index is 28.75 kg/m  as calculated from the following:    Height as of 11/22/23: 1.905 m (6' 3\").    Weight as of 11/22/23: 104.3 kg (230 lb).     Sedation Ordered  MAC/deep sedation.   BMI<= 45 45 < BMI <= 48 48 < BMI < = 50  BMI > 50   No Restrictions No [facility]  No [facility]  [facility] with exceptions Hospital Only OR Only       Do you have a history of malignant hyperthermia?  No    (Females) Are you currently pregnant?        Have you been diagnosed or told you have pulmonary hypertension?   No    Do you have an LVAD?  No    Have you been told you have moderate to severe sleep apnea?  No    Have you been told you have COPD, asthma, or any other lung disease?  No    Do you have any heart conditions?  No     Have you ever had or are you waiting for an organ transplant?  No. Continue scheduling, no site restrictions.    Have you had a stroke or transient ischemic attack (TIA aka \"mini stroke\" in the last 6 months?   No    Have you been diagnosed with or been told you have cirrhosis of the liver?   No    Are you currently on dialysis?   No    Do you need assistance transferring?   No    BMI: Estimated body mass index is 28.75 kg/m  as calculated from the following:    Height as of 11/22/23: 1.905 m (6' 3\").    Weight as of 11/22/23: 104.3 kg (230 lb).     Is patients BMI > 40 and scheduling location [facility]?  No    Do you take an injectable medication for weight loss or diabetes (excluding insulin)?  No    Do you take the medication Naltrexone?  No    Do you take blood thinners?  No       Prep   Are you " currently on dialysis or do you have chronic kidney disease?  No    Do you have a diagnosis of diabetes?  No    Do you have a diagnosis of cystic fibrosis (CF)?  No    On a regular basis do you go 3 -5 days between bowel movements?  No    BMI > 40?  No    Preferred Pharmacy:    [id number] - [place], [place] - [address] AT [facility] [address]  [address] [place] [address]  Phone: [phone] Fax: [phone]    Final Scheduling Details     Procedure scheduled  Colonoscopy    Surgeon:  BUZZ HORTON     Date of procedure:  8/6     Pre-OP / PAC:   No - Not required for this site.    Location  [facility] - Per order.    Sedation   MAC/Deep Sedation - Per order.      Patient Reminders:   You will receive a call from a Nurse to review instructions and health history.  This assessment must be completed prior to your procedure.  Failure to complete the Nurse assessment may result in the procedure being cancelled.      On the day of your procedure, please designate an adult(s) who can drive you home stay with you for the next 24 hours. The medicines used in the exam will make you sleepy. You will not be able to drive.      You cannot take public transportation, ride share services, or non-medical taxi service without a responsible caregiver.  Medical transport services are allowed with the requirement that a responsible caregiver will receive you at your destination.  We require that drivers and caregivers are confirmed prior to your procedure.

## 2024-04-18 ENCOUNTER — TELEPHONE (OUTPATIENT)
Dept: GASTROENTEROLOGY | Facility: CLINIC | Age: 34
End: 2024-04-18

## 2024-04-18 ENCOUNTER — VIRTUAL VISIT (OUTPATIENT)
Dept: GASTROENTEROLOGY | Facility: CLINIC | Age: 34
End: 2024-04-18
Attending: PHYSICIAN ASSISTANT
Payer: COMMERCIAL

## 2024-04-18 ENCOUNTER — PRE VISIT (OUTPATIENT)
Dept: GASTROENTEROLOGY | Facility: CLINIC | Age: 34
End: 2024-04-18
Payer: COMMERCIAL

## 2024-04-18 VITALS — BODY MASS INDEX: 28.47 KG/M2 | WEIGHT: 229 LBS | HEIGHT: 75 IN

## 2024-04-18 DIAGNOSIS — K83.01 PSC (PRIMARY SCLEROSING CHOLANGITIS) (H): ICD-10-CM

## 2024-04-18 DIAGNOSIS — K51.00 ULCERATIVE PANCOLITIS WITHOUT COMPLICATION (H): ICD-10-CM

## 2024-04-18 DIAGNOSIS — K83.01 PSC (PRIMARY SCLEROSING CHOLANGITIS) (H): Primary | ICD-10-CM

## 2024-04-18 PROCEDURE — 99214 OFFICE O/P EST MOD 30 MIN: CPT | Mod: 95 | Performed by: INTERNAL MEDICINE

## 2024-04-18 ASSESSMENT — PAIN SCALES - GENERAL: PAINLEVEL: NO PAIN (0)

## 2024-04-18 NOTE — LETTER
4/18/2024         RE: Uriah Mortensen  [address]  [facility] [address]        Dear Colleague,    Thank you for referring your patient, Uriah Mortensen, to the [facility] [place]. Please see a copy of my visit note below.    Virtual Visit Details    Type of service:  Video Visit     Originating Location (pt. Location): Home  Distant Location (provider location):  On-site  Platform used for Video Visit: AmWell    Time of video start: 8:13 AM  Time of video end: 8:51 PM     Date of Service: 4/18/2024     Referring Provider: Ronak Bergman PA-C    Subjective:            Uriah Mortensen is a 33 year old male presenting for evaluation of PSC.    History of Present Illness   Uriah Mortensen is a 33 year old male with past medical history of ulcerative colitis, celiac disease, EoE versus reflux who presents to clinic to re-establish care for PSC.     Mr. Mortensen used to follow with Dr. Graciela Herrera starting in 2016. He was initially diagnosed with UC and PSC at the same time in 2014. He had an MRI abdomen WWO contrast with MRCP 10/15/2014 at [place] which showed prominence of the CBD (10 mm) with minimal prominence of the intrahepatic bile ducts, mild irregularity and stricturing of peripheral ducts, all consistent with primary sclerosing cholangitis; no masses or strictures were noted at this time. He had serial imaging on MRI 8/8/2017 and 1/21/2019 in [place] which showed similar findings with R>L sided disease and no masses. He had a liver biopsy 10/2017 which showed PSC without inflammation. Around that time, he had elevated liver enzymes with ALT 200s, AST 60s-160, alkaline phosphatase 200s-300s. He last saw Dr. Herrera 8/2020, at which time they planned for repeat MRI for cholangiocarcinoma and HCC screening, but does not appear this was completed. Mr. Mortensen explained today that he thinks he stopped following given his PSC was not very active and things had been  stable. He presents today to re-establish care regarding his PSC.     He overall feels that things are going well. He says that his UC and celiac disease have more obvious symptoms that affect him and he isn't as sure what to look out for in terms of PSC. Denies issues with pruritus, jaundice, abdominal pain/distension, LE edema, acholic stools, fevers, chills. His weight has been stable. Says that he is doing well in terms of his UC on Humira, azathioprine, and mesalamine. He follows a gluten free diet for his celiac disease. He does not use any OTC medications or supplements. Denies prior episodes of cholangitis or prior need for an ERCP.     No family history of IBD, PSC, liver disease or colon cancer.     Past Medical History:  Past Medical History:   Diagnosis Date    Celiac disease     PSC (primary sclerosing cholangitis) (H28)     Ulcerative colitis (H)        Surgical History:  Past Surgical History:   Procedure Laterality Date    COLONOSCOPY N/A 6/5/2019    Procedure: COLONOSCOPY, WITH BIOPSY;  Surgeon: Jasen Watkins MD;  [facility]    COLONOSCOPY N/A 6/14/2023    Procedure: COLONOSCOPY, WITH CHROMOENDOSCOPY,   POLYPECTOMY AND BIOPSY;  Surgeon: Jasen Watkins MD;  Location[facility] OR       Social History:  Social History     Tobacco Use    Smoking status: Never    Smokeless tobacco: Never   Substance Use Topics    Alcohol use: No     Alcohol/week: 0.0 standard drinks of alcohol     Comment: None    Drug use: No   No EtOH use, tobacco, no recreational drug use.     Family History:  Family History   Problem Relation Age of Onset    Hypertension Father     Lupus Paternal Grandmother     Colon Cancer Paternal Grandfather        Medications:  Current Outpatient Medications   Medication Sig Dispense Refill    adalimumab (HUMIRA *CF* PEN) 40 MG/0.4ML pen kit Inject 0.4 mLs (40 mg) Subcutaneous every 7 days 4 each 2    azaTHIOprine (IMURAN) 50 MG tablet Take 3.5 tablets (175 mg) by mouth daily 315  "tablet 1    EPINEPHrine (EPIPEN 2-NAGI) 0.3 MG/0.3ML injection 2-pack Inject when needed for emergency treatment of severe allergic reactions (anaphylaxis).      mesalamine (LIALDA) 1.2 g DR tablet Take 4 tablets (4,800 mg) by mouth daily (with breakfast) - Oral 120 tablet 5     No current facility-administered medications for this visit.       Review of Systems  A complete 10 point review of systems was asked and answered in the negative unless specifically commented upon in the HPI    Objective:         Vitals:    04/18/24 0804   Weight: 103.9 kg (229 lb)   Height: 1.905 m (6' 3\")     Body mass index is 28.62 kg/m .     Physical Exam  Limited by virtual exam; pt did not have camera on.     Labs and Diagnostic tests:  12/21/2023  - ALT 69  - AST 56  - alkaline phosphatase 66  - Tbili 1.3  - Indirect bili 1.2   - Albumin 4.4  - Vitamin D 40   - Vitamin B12 418    Imaging:  MRI Abdomen W&WO Contrast 1/21/2019  Impression  \"There is some persistent irregularity in the branching intrahepatic ducts near the confluence. This is more noticeable in the right lobe ducts than the left. Due to some breathing motion resolution is somewhat reduced on the current images. Findings are likely related to patient's history of ascending cholangitis.    No evidence of hepatic mass.\"    Procedures:  Liver biopsy 10/17/2017:   CASE FROM [place], [place] ([id number], OBTAINED   09/29/2017):   Liver, needle biopsy   - Focal bridging portal fibrosis with bile ductular perforation,   consistent with primary sclerosing cholangitis   - No evidence of superimposed acute hepatitis.   - See microscopic description     EGD 12/17/2018:  Esophagus with Schatzki ring in distal esophagus and otherwise normal appearing esophagus. Normal stomach and duodenum.     FINAL DIAGNOSIS:  A. DUODENAL BIOPSY:  - Fragments of unremarkable duodenal mucosa  - No evidence of celiac sprue or peptic duodenitis    B. ESOPHAGEAL BIOPSY, DISTAL:  - " Squamous mucosa with focal increased intraepithelial eosinophils (up to  42eos/hpf)  - Negative for intestinal metaplasia and dysplasia    C. ESOPHAGEAL BIOPSY, MID:  - Esophageal squamous mucosa with no significant histologic abnormality  - Negative for intestinal metaplasia and dysplasia      Colonoscopy 6/14/2023:   Impression:            - Patchy mild inflammation was found in the ascending                          colon and at the cecum. Chromoscopy performed.                          Biopsied: Random cecal biopsies, random colon at 75,                          and random colon at 70cm. Additional random left colon                          biopsies taken.                          - Focal lesion of inflammation / ulceration was noted                          in the cecum. Biopsied. If dysplastic potentially                          could be removed with EMR through advanced endoscopy.                          - One 3 mm polyp in the transverse colon, removed with                          a cold biopsy forceps. Resected and retrieved.                          - One 3 mm polyp in the sigmoid colon, removed with a                          cold snare. Resected and retrieved. Clips were placed.                          Clip : Hyperpia.     A. CECAL ULCER, BIOPSIES:  -Mild-moderate chronic active colitis with cryptitis and rare crypt abscess formation  -CMV immunoperoxidase stain is negative (appropriate controls obtained)  -Negative for dysplasia  B. CECUM, RANDOM BIOPSIES:  -Colonic mucosa with mild crypt glandular distortion otherwise no significant histologic abnormality  -Negative for acute inflammation and dysplasia  C. COLON AT 75 CM, BIOPSY:  -Mild chronic active colitis with cryptitis  -Minute detached fragments of necropurulent exudate suggestive of adjacent ulceration  -Negative for dysplasia  D. COLON AT 70 CM, BIOPSY:  -Colonic mucosa with no significant histologic  abnormality  -Negative for acute inflammation and dysplasia  E. TRANSVERSE COLON POLYP, POLYPECTOMY:  -Strip of colonic mucosa with lymphoid aggregate, clinically polypoid  -Negative for dysplasia  F. LEFT COLON, RANDOM BIOPSIES:  -Colonic mucosa with no significant histologic abnormality  -Negative for acute inflammation and dysplasia  G. SIGMOID POLYP, POLYPECTOMY:  -Hyperplastic polyp    Assessment and Plan:  Uriah Mortensen is a 34 yo man with PMHx UC, PSC, celiac disease, and EoE versus GERD who presents to clinic for management of PSC.     - Overall, Mr. Mortensen is doing well from his PSC perspective. He is not experiencing any symptoms related to it, has not previously had episodes of cholangitis or required ERCP. Thus, most of the focus for him will be on cholangiocarcinoma and HCC screening.   - Of note, 5% of patients with ulcerative colitis will develop PSC, and conversely, 80% of patients with PSC will be diagnosed with UC during their lifetime.    - We discussed in detail that both PSC and UC convey an increased risk of cholangiocarcinoma and colon cancer, respectively.  I also relayed that when patients have both PSC and UC, their cancer risk increases even higher.  Lifetime risk of the development of cholangiocarcinoma in patients with PSC is approximately 10% lifetime, and while there is little data to support screening, it is felt prudent to enter to patient into a regimen of routine imaging.  We also discussed the occasional use of ursodeoxycholic acid in patients with PSC, and that the data is controversial.  While some studies have shown improvement in liver tests, there has been no demonstrated benefit in delayed time to development of cirrhosis or liver transplantation.  - CCA/HCC screening in patients with PSC  - Agree with screening for cholangiocarcinoma in this patient.  We would recommend the following regimen:   - Yearly MRI/MRCP    - Yearly  and AFP (at time of imaging  studies)  (please note there is no specific recommendation from the AASLD or other GI professional society to support this screening regimen.)    This patient was seen and discussed with attending physician, Dr. Leventhal.    Baylee Bernabe MD  PGY-3  Internal Medicine    Attestation signed by Leventhal, Thomas Michael, MD at 4/18/2024 12:28 PM:  Staff Physician Attestation  I, Thomas M. Leventhal, M.D., discussed and examined this patient with the resident and agree with the resident s findings and plan of care as documented in the fellow s note.  I personally reviewed vital signs, medications, labs, and imaging.     33y M with PMHx of bx-proven PSC and UC on immune therapies who presents to re-establish cares.   - No overt symptoms of PSC; near normal liver tests    - Discussed screening for HCC and CCA  - will need yearly colonscopy for CRC screening    Thomas M. Leventhal, M.D.  Associate Professor of Medicine  Advanced & Transplant Hepatology  [facility]  Date of Service: 04/18/24

## 2024-04-18 NOTE — NURSING NOTE
Is the patient currently in the state of [place]? YES    Visit mode:VIDEO    If the visit is dropped, the patient can be reconnected by: VIDEO VISIT: Text to cell phone:   Telephone Information:   Mobile [phone]       Will anyone else be joining the visit? NO  (If patient encounters technical issues they should call [phone])    How would you like to obtain your AVS? MyChart    Are changes needed to the allergy or medication list? No    Are refills needed on medications prescribed by this physician? NO    Reason for visit: Video Visit (Consult)    Elizabeth KOHLER

## 2024-04-18 NOTE — PROGRESS NOTES
Date of Service: 4/18/2024     Referring Provider: Ronak Bergman PA-C    Subjective:            Uriah Mortensen is a 33 year old male presenting for evaluation of PSC.    History of Present Illness   Uriah Mortensen is a 33 year old male with past medical history of ulcerative colitis, celiac disease, EoE versus reflux who presents to clinic to re-establish care for PSC.     Mr. Mortensen used to follow with Dr. Graciela Herrera starting in 2016. He was initially diagnosed with UC and PSC at the same time in 2014. He had an MRI abdomen WWO contrast with MRCP 10/15/2014 at [place] which showed prominence of the CBD (10 mm) with minimal prominence of the intrahepatic bile ducts, mild irregularity and stricturing of peripheral ducts, all consistent with primary sclerosing cholangitis; no masses or strictures were noted at this time. He had serial imaging on MRI 8/8/2017 and 1/21/2019 in [place] which showed similar findings with R>L sided disease and no masses. He had a liver biopsy 10/2017 which showed PSC without inflammation. Around that time, he had elevated liver enzymes with ALT 200s, AST 60s-160, alkaline phosphatase 200s-300s. He last saw Dr. Herrera 8/2020, at which time they planned for repeat MRI for cholangiocarcinoma and HCC screening, but does not appear this was completed. Mr. Mortensen explained today that he thinks he stopped following given his PSC was not very active and things had been stable. He presents today to re-establish care regarding his PSC.     He overall feels that things are going well. He says that his UC and celiac disease have more obvious symptoms that affect him and he isn't as sure what to look out for in terms of PSC. Denies issues with pruritus, jaundice, abdominal pain/distension, LE edema, acholic stools, fevers, chills. His weight has been stable. Says that he is doing well in terms of his UC on Humira, azathioprine, and mesalamine. He follows a gluten free diet for his celiac  disease. He does not use any OTC medications or supplements. Denies prior episodes of cholangitis or prior need for an ERCP.     No family history of IBD, PSC, liver disease or colon cancer.     Past Medical History:  Past Medical History:   Diagnosis Date    Celiac disease     PSC (primary sclerosing cholangitis) (H28)     Ulcerative colitis (H)        Surgical History:  Past Surgical History:   Procedure Laterality Date    COLONOSCOPY N/A 6/5/2019    Procedure: COLONOSCOPY, WITH BIOPSY;  Surgeon: Jasen Watkins MD;  [facility]    COLONOSCOPY N/A 6/14/2023    Procedure: COLONOSCOPY, WITH CHROMOENDOSCOPY,   POLYPECTOMY AND BIOPSY;  Surgeon: Jasen Watkins MD;  [facility]       Social History:  Social History     Tobacco Use    Smoking status: Never    Smokeless tobacco: Never   Substance Use Topics    Alcohol use: No     Alcohol/week: 0.0 standard drinks of alcohol     Comment: None    Drug use: No   No EtOH use, tobacco, no recreational drug use.     Family History:  Family History   Problem Relation Age of Onset    Hypertension Father     Lupus Paternal Grandmother     Colon Cancer Paternal Grandfather        Medications:  Current Outpatient Medications   Medication Sig Dispense Refill    adalimumab (HUMIRA *CF* PEN) 40 MG/0.4ML pen kit Inject 0.4 mLs (40 mg) Subcutaneous every 7 days 4 each 2    azaTHIOprine (IMURAN) 50 MG tablet Take 3.5 tablets (175 mg) by mouth daily 315 tablet 1    EPINEPHrine (EPIPEN 2-NAGI) 0.3 MG/0.3ML injection 2-pack Inject when needed for emergency treatment of severe allergic reactions (anaphylaxis).      mesalamine (LIALDA) 1.2 g DR tablet Take 4 tablets (4,800 mg) by mouth daily (with breakfast) - Oral 120 tablet 5     No current facility-administered medications for this visit.       Review of Systems  A complete 10 point review of systems was asked and answered in the negative unless specifically commented upon in the HPI    Objective:         Vitals:     "04/18/24 0804   Weight: 103.9 kg (229 lb)   Height: 1.905 m (6' 3\")     Body mass index is 28.62 kg/m .     Physical Exam  Limited by virtual exam; pt did not have camera on.     Labs and Diagnostic tests:  12/21/2023  - ALT 69  - AST 56  - alkaline phosphatase 66  - Tbili 1.3  - Indirect bili 1.2   - Albumin 4.4  - Vitamin D 40   - Vitamin B12 418    Imaging:  MRI Abdomen W&WO Contrast 1/21/2019  Impression  \"There is some persistent irregularity in the branching intrahepatic ducts near the confluence. This is more noticeable in the right lobe ducts than the left. Due to some breathing motion resolution is somewhat reduced on the current images. Findings are likely related to patient's history of ascending cholangitis.    No evidence of hepatic mass.\"    Procedures:  Liver biopsy 10/17/2017:   CASE FROM [place], [place] ([id number], OBTAINED   09/29/2017):   Liver, needle biopsy   - Focal bridging portal fibrosis with bile ductular perforation,   consistent with primary sclerosing cholangitis   - No evidence of superimposed acute hepatitis.   - See microscopic description     EGD 12/17/2018:  Esophagus with Schatzki ring in distal esophagus and otherwise normal appearing esophagus. Normal stomach and duodenum.     FINAL DIAGNOSIS:  A. DUODENAL BIOPSY:  - Fragments of unremarkable duodenal mucosa  - No evidence of celiac sprue or peptic duodenitis    B. ESOPHAGEAL BIOPSY, DISTAL:  - Squamous mucosa with focal increased intraepithelial eosinophils (up to  42eos/hpf)  - Negative for intestinal metaplasia and dysplasia    C. ESOPHAGEAL BIOPSY, MID:  - Esophageal squamous mucosa with no significant histologic abnormality  - Negative for intestinal metaplasia and dysplasia      Colonoscopy 6/14/2023:   Impression:            - Patchy mild inflammation was found in the ascending                          colon and at the cecum. Chromoscopy performed.                          Biopsied: Random cecal " biopsies, random colon at 75,                          and random colon at 70cm. Additional random left colon                          biopsies taken.                          - Focal lesion of inflammation / ulceration was noted                          in the cecum. Biopsied. If dysplastic potentially                          could be removed with EMR through advanced endoscopy.                          - One 3 mm polyp in the transverse colon, removed with                          a cold biopsy forceps. Resected and retrieved.                          - One 3 mm polyp in the sigmoid colon, removed with a                          cold snare. Resected and retrieved. Clips were placed.                          Clip : [employer].     A. CECAL ULCER, BIOPSIES:  -Mild-moderate chronic active colitis with cryptitis and rare crypt abscess formation  -CMV immunoperoxidase stain is negative (appropriate controls obtained)  -Negative for dysplasia  B. CECUM, RANDOM BIOPSIES:  -Colonic mucosa with mild crypt glandular distortion otherwise no significant histologic abnormality  -Negative for acute inflammation and dysplasia  C. COLON AT 75 CM, BIOPSY:  -Mild chronic active colitis with cryptitis  -Minute detached fragments of necropurulent exudate suggestive of adjacent ulceration  -Negative for dysplasia  D. COLON AT 70 CM, BIOPSY:  -Colonic mucosa with no significant histologic abnormality  -Negative for acute inflammation and dysplasia  E. TRANSVERSE COLON POLYP, POLYPECTOMY:  -Strip of colonic mucosa with lymphoid aggregate, clinically polypoid  -Negative for dysplasia  F. LEFT COLON, RANDOM BIOPSIES:  -Colonic mucosa with no significant histologic abnormality  -Negative for acute inflammation and dysplasia  G. SIGMOID POLYP, POLYPECTOMY:  -Hyperplastic polyp    Assessment and Plan:  Uriah Mortensen is a 32 yo man with PMHx UC, PSC, celiac disease, and EoE versus GERD who presents to clinic for  management of PSC.     - Overall, Mr. Mortensen is doing well from his PSC perspective. He is not experiencing any symptoms related to it, has not previously had episodes of cholangitis or required ERCP. Thus, most of the focus for him will be on cholangiocarcinoma and HCC screening.   - Of note, 5% of patients with ulcerative colitis will develop PSC, and conversely, 80% of patients with PSC will be diagnosed with UC during their lifetime.    - We discussed in detail that both PSC and UC convey an increased risk of cholangiocarcinoma and colon cancer, respectively.  I also relayed that when patients have both PSC and UC, their cancer risk increases even higher.  Lifetime risk of the development of cholangiocarcinoma in patients with PSC is approximately 10% lifetime, and while there is little data to support screening, it is felt prudent to enter to patient into a regimen of routine imaging.  We also discussed the occasional use of ursodeoxycholic acid in patients with PSC, and that the data is controversial.  While some studies have shown improvement in liver tests, there has been no demonstrated benefit in delayed time to development of cirrhosis or liver transplantation.  - CCA/HCC screening in patients with PSC  - Agree with screening for cholangiocarcinoma in this patient.  We would recommend the following regimen:   - Yearly MRI/MRCP    - Yearly  and AFP (at time of imaging studies)  (please note there is no specific recommendation from the AASLD or other GI professional society to support this screening regimen.)    This patient was seen and discussed with attending physician, Dr. Leventhal.    Baylee Bernabe MD  PGY-3  Internal Medicine

## 2024-04-18 NOTE — TELEPHONE ENCOUNTER
Orders faxed.    Drea PIPER LPN  Hepatology Clinic   -------  Leventhal, Thomas Michael, MD Beckenbach, Shannon, LPN  Can you please fax the lab orders and 2 MRI/MRCP orders to [facility]?  Thanks!

## 2024-04-18 NOTE — TELEPHONE ENCOUNTER
Call back to Stanley with [facility] in [place]. [facility] is not able to take the combined order for the MRI/MRCP they need to be separate orders and they also need lab orders to check a Bun and creatinine prior to both imaging. Orders  and re faxed along with lab orders.    Drea PIPER LPN  Hepatology Clinic     ----------  [facility] Call Center    Phone Message    May a detailed message be left on voicemail: yes     Reason for Call: Other: Received order, need another order MRI abdomen w/o contrast MRCP in comments and BUN creatinine lab orders as well. Please fax to      Action Taken: Other: hepa    Travel Screening: Not Applicable

## 2024-04-18 NOTE — Clinical Note
PSC and UC.  Discussed trial.  [place] in [place], but would be interested in hearing about it

## 2024-04-18 NOTE — PROGRESS NOTES
Virtual Visit Details    Type of service:  Video Visit     Originating Location (pt. Location): Home  Distant Location (provider location):  On-site  Platform used for Video Visit: [facility]    Time of video start: 8:13 AM  Time of video end: 8:51 PM

## 2024-04-24 ENCOUNTER — DOCUMENTATION ONLY (OUTPATIENT)
Dept: GASTROENTEROLOGY | Facility: CLINIC | Age: 34
End: 2024-04-24
Payer: COMMERCIAL

## 2024-04-24 DIAGNOSIS — K83.01 PSC (PRIMARY SCLEROSING CHOLANGITIS) (H): Primary | ICD-10-CM

## 2024-04-24 NOTE — PROGRESS NOTES
Received fax from [facility] stating MRI w/wo contrast has to be . Will now need to fax an order for the MRI stating without contrast to go with the order for MRI with contrast. New orders faxed.    Drea PIPER LPN  Hepatology Clinic

## 2024-05-13 DIAGNOSIS — K51.00 ULCERATIVE PANCOLITIS WITHOUT COMPLICATION (H): ICD-10-CM

## 2024-05-16 ENCOUNTER — TRANSFERRED RECORDS (OUTPATIENT)
Dept: HEALTH INFORMATION MANAGEMENT | Facility: CLINIC | Age: 34
End: 2024-05-16
Payer: COMMERCIAL

## 2024-05-22 ENCOUNTER — VIRTUAL VISIT (OUTPATIENT)
Dept: GASTROENTEROLOGY | Facility: CLINIC | Age: 34
End: 2024-05-22
Attending: PHYSICIAN ASSISTANT
Payer: COMMERCIAL

## 2024-05-22 DIAGNOSIS — K51.00 ULCERATIVE PANCOLITIS WITHOUT COMPLICATION (H): Primary | ICD-10-CM

## 2024-05-22 PROCEDURE — 99214 OFFICE O/P EST MOD 30 MIN: CPT | Mod: 95 | Performed by: PHYSICIAN ASSISTANT

## 2024-05-22 NOTE — NURSING NOTE
Is the patient currently in the state of [place]? YES    Visit mode:VIDEO    If the visit is dropped, the patient can be reconnected by: VIDEO VISIT: Send to e-mail at: [email or web address]    Will anyone else be joining the visit? NO  (If patient encounters technical issues they should call [phone]    How would you like to obtain your AVS? MyChart    Are changes needed to the allergy or medication list? No    Are refills needed on medications prescribed by this physician? NO    Reason for visit: RECHECK    Amanda KOHLER

## 2024-05-22 NOTE — PATIENT INSTRUCTIONS
It was a pleasure taking care of you today.  I've included a brief summary of our discussion and care plan from today's visit below.  Please review this information with your primary care provider.  ______________________________________________________________________    My recommendations are summarized as follows:    -- Continue humira, azathioprine and mesalamine   -- Labs every 3 months  -- Next endoscopic assessment: Summer 2024  -- Patient with IBD we recommend supplementation vitamin D 1000 units daily and calcium 500 mg twice daily.  -- Vaccines/immunizations to be updated: flu, covid, pneumonia, shingles, tetanus  -- Yearly Dermatology visit for skin check while on immunosuppressive therapy. Can call [phone] to schedule.  -- No NSAIDs (ibuprofen, or anything containing ibuprofen)    For additional resources about inflammatory bowel disease visit http://www.crohnscolitisfoundation.org/    To learn more about Diet and Nutrition in the setting of IBD, check out some of these resources:  https://www.crohnscolitisfoundation.org/diet-and-nutrition/what-should-i-eat  https://www.nimbal.org/  https://ntforibd.org/     Return to GI Clinic in 6 months to review your progress.    ______________________________________________________________________    How do I schedule labs, imaging studies, or procedures that were ordered in clinic today?     Labs: To schedule lab appointment at the [facility], use my chart or call [phone]. If you have a [facility] lab closer to home where you are regularly seen you can give them a call.     Procedures: If a colonoscopy, upper endoscopy, breath test, esophageal manometry, or pH impedence was ordered today, our endoscopy team will call you to schedule this. If you have not heard from our endoscopy team within a week, please call [phone] to schedule.     Imaging Studies: If you were scheduled for a CT scan, X-ray, MRI, ultrasound, HIDA scan or other  imaging study, please call [phone] to have this scheduled.     Referral: If a referral to another specialty was ordered, expect a phone call or follow instructions above. If you have not heard from anyone regarding your referral in a week, please call our clinic to check the status.     Who do I call with any questions after my visit?  Please be in touch if there are any further questions that arise following today's visit.  There are multiple ways to contact your gastroenterology care team.      During business hours, you may reach a Gastroenterology nurse at [phone]    To schedule or reschedule an appointment, please call [phone].     You can always send a secure message through [employer].  [employer] messages are answered by your nurse or doctor typically within 24 hours.  Please allow extra time on weekends and holidays.      For urgent/emergent questions after business hours, you may reach the on-call GI Fellow by contacting the [facility]  at [phone].     How will I get the results of any tests ordered?    You will receive all of your results.  If you have signed up for [employer], any tests ordered at your visit will be available to you after your physician reviews them.  Typically this takes 1-2 weeks.  If there are urgent results that require a change in your care plan, your physician or nurse will call you to discuss the next steps.      What is [employer]?  [employer] is a secure way for you to access all of your healthcare records from the [facility].  It is a web based computer program, so you can sign on to it from any location.  It also allows you to send secure messages to your care team.  I recommend signing up for [employer] access if you have not already done so and are comfortable with using a computer.         Sincerely,    Ronak Bergman PA-C  [facility]  Division of Gastroenterology

## 2024-05-22 NOTE — LETTER
5/22/2024         RE: Uriah Mortensen  [address] [place] [address]        Dear Colleague,    Thank you for referring your patient, Uriah Mortensen, to the [facility] [place]. Please see a copy of my visit note below.    IBD CLINIC VISIT  CC/REFERRING MD:  Referred Self  REASON FOR CONSULTATION: f/u PSC colitis    ASSESSMENT/PLAN  34 year old man with PSC colitis     1. Colitis  Current medication:    - adalimumab weekly (started 12/2018, increased to weekly summer 2019)   - Azathioprine  175 mg daily   - Mesalamine 4.8g per day  Current clinical disease activity: remission   Current endoscopic disease activity: 6/2023 patchy mild inflammation found in the ascending colon and cecum.  Focal lesion of inflammation/ulceration noted in the cecum that was biopsied, found to have mild to moderate inflammation in this area on biopsies.    He continues to be in clinical remission on weekly adalimumab, azathioprine, mesalamine 4.8 g/day.  He believes that moving to adalimumab weekly have the greatest impact on his symptoms.    We again discussed his treatment regimen and if there might be opportunity to scale back.  In the setting of some areas with mild to moderate inflammation, I would encouraged to maintain his current IBD therapy as is.  We discussed some dietary elements to implement such as the SCD diet to see if this paired with his medications may allow for scaling back his medications.  This may especially be helpful and effective given he is already gluten-free with his celiac disease.    His greatest risk long-term was for colorectal cancer and that treating inflammation to normal or mild decrease the risk of colon cancer.  It would be ideal to continue to monitor him on his current meds, but de-escalating other therapies is certainly reasonable in the future as we continuously reevaluate the risk-benefit profile.    -- Labs: CBC, LFTs, CRP, due  -- Continue  weekly Humira  -- Continue azathiorpine 175mg daily   -- Continue mesalamine 4.8g per day  -- Consider SCD diet    2. PSC  - continue to follow with Dr. Herrera in liver clinic. Due for follow-up.     3. Reported celiac disease  EGD  at [facility] does state mucosal changes seen consistent with celiac, however path unavailable. EGD  normal duodenal bxs on gluten free diet.  Celiac serologies normal in [facility] records.  Will have cramping and diarrhea if he accidentally eats gluten.  -- Annual celiac labs    4. Eosinophilic esophagitis  No dysphagia at today's visit.  Unclear if he has EoE or reactive changes related to reflux.  Denies any dysphagia; his father has dysphagia history.  Had > 15 eosinophils/hpf on distal esophageal biopsies 18, but biopsies from mid-esophagus were normal.    -- Does not take PPI  -- Monitor.     5. IBD dysplasia surveillance: Colitis with PSC. Needs an annual dysplasia surveillance   - yearly dysplasia colonoscopy with bx due summer 2024 (ordered)     IBD HISTORY  Age at diagnosis: 23  Extent of disease: Pancolitis   Prior IBD surgeries: None  Prior IBD Medications:  - Vedolizumab (2016 - 2017) stopped due to ongoing inflammation on colonoscopy (not clear if drug/Ab levels were checked)  - Infliximab-developed antibodies (2018)  - Budesonide  - Asacol      DRUG MONITORING  TPMT enzyme activity: 29.4 (18)     6-TGN/6-MMPN levels:   6TG 296, 6MMP 5229     Biologic concentration:  Infliximab 18-drug level 0, antibody greater than 200      HPI:   Here today for a follow-up evaluation.      Clinically he is feeling well. He reports normal stools. 2 stools per day, formed stools. No blood in stools. No EIM.    Woody score:   Stool freq: 0 (baseline stools frequency)  Rectal bleedin (None)  PGA: 0 (normal)  Endoscopy: Images unable for review.     sIBDQ:  IBDQ Score Date IBDQ - Total Score  (Higher score better)   2024   4:57 PM 69   4/10/2023  12:04 PM 64    10/10/2022   2:13 PM 62   1/5/2022   6:40 AM 63   6/9/2019   1:03 PM 65   12/17/2018   5:34 AM 64   8/18/2018   1:51 PM 65      ROS:    Constitutional, HEENT, cardiovascular, pulmonary, GI, , musculoskeletal, neuro, skin, endocrine and psych systems are negative, except as otherwise noted.    PHYSICAL EXAMINATION:  Constitutional: aaox3, cooperative, pleasant, not dyspneic/diaphoretic, no acute distress  Vitals reviewed: There were no vitals taken for this visit.  Wt:   Wt Readings from Last 2 Encounters:   04/18/24 103.9 kg (229 lb)   11/22/23 104.3 kg (230 lb)      Constitutional - general appearance is well and in no acute distress. Body habitus normal  Eyes - No redness or discharge  Respiratory - No cough, unlabored breathing  Musculoskeletal - range of motion intact: Neck and arms  Skin - No discoloration or lesions  Neurological - No tremors, headaches  Psychiatric - No anxiety, alert & oriented     PERTINENT PAST MEDICAL HISTORY:  Past Medical History:   Diagnosis Date    Celiac disease     PSC (primary sclerosing cholangitis) (H28)     Ulcerative colitis (H)        PREVIOUS SURGERIES:  Past Surgical History:   Procedure Laterality Date    COLONOSCOPY N/A 6/5/2019    Procedure: COLONOSCOPY, WITH BIOPSY;  Surgeon: Jasen Watkins MD;  [facility]    COLONOSCOPY N/A 6/14/2023    Procedure: COLONOSCOPY, WITH CHROMOENDOSCOPY,   POLYPECTOMY AND BIOPSY;  Surgeon: Jasen Watkins MD;  Location: [facility] OR       ALLERGIES:     Allergies   Allergen Reactions    Fish-Derived Products Hives     Positive skin test to fish and shellfish on 9/14/2016.    Shellfish Allergy Hives, Itching and Shortness Of Breath    Gluten Meal        PERTINENT MEDICATIONS:    Current Outpatient Medications:     adalimumab (HUMIRA *CF* PEN) 40 MG/0.4ML pen kit, Inject 0.4 mLs (40 mg) Subcutaneous every 7 days, Disp: 4 each, Rfl: 2    azaTHIOprine (IMURAN) 50 MG tablet, Take 3.5 tablets (175 mg) by mouth daily, Disp: 315  tablet, Rfl: 1    EPINEPHrine (EPIPEN 2-NAGI) 0.3 MG/0.3ML injection 2-pack, Inject when needed for emergency treatment of severe allergic reactions (anaphylaxis)., Disp: , Rfl:     mesalamine (LIALDA) 1.2 g DR tablet, Take 4 tablets (4,800 mg) by mouth daily (with breakfast) - Oral, Disp: 120 tablet, Rfl: 5    SOCIAL HISTORY:  Social History     Socioeconomic History    Marital status:      Spouse name: Not on file    Number of children: Not on file    Years of education: Not on file    Highest education level: Not on file   Occupational History    Not on file   Tobacco Use    Smoking status: Never    Smokeless tobacco: Never   Substance and Sexual Activity    Alcohol use: No     Alcohol/week: 0.0 standard drinks of alcohol     Comment: None    Drug use: No    Sexual activity: Not on file   Other Topics Concern    Not on file   Social History Narrative    Not on file     Social Determinants of Health     Financial Resource Strain: Not on file   Food Insecurity: Not on file   Transportation Needs: Not on file   Physical Activity: Not on file   Stress: Not on file   Social Connections: Not on file   Interpersonal Safety: Not on file   Housing Stability: Not on file       FAMILY HISTORY:  Family History   Problem Relation Age of Onset    Hypertension Father     Lupus Paternal Grandmother     Colon Cancer Paternal Grandfather        Past/family/social history reviewed and no changes        Again, thank you for allowing me to participate in the care of your patient.      Sincerely,    Ronak Bergman PA-C

## 2024-05-22 NOTE — PROGRESS NOTES
Virtual Visit Details    Type of service:  Video Visit     Originating Location (pt. 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 Colitis  Current medication:    - adalimumab weekly (started 12/2018, increased to weekly summer 2019)   - Azathioprine  175 mg daily   - Mesalamine 4.8g per day  Current clinical disease activity: remission   Current endoscopic disease activity: 6/2023 patchy mild inflammation found in the ascending colon and cecum.  Focal lesion of inflammation/ulceration noted in the cecum that was biopsied, found to have mild to moderate inflammation in this area on biopsies.    He continues to be in clinical remission on weekly adalimumab, azathioprine, mesalamine 4.8 g/day.  He believes that moving to adalimumab weekly have the greatest impact on his symptoms.    We again discussed his treatment regimen and if there might be opportunity to scale back.  In the setting of some areas with mild to moderate inflammation, I would encouraged to maintain his current IBD therapy as is.  We discussed some dietary elements to implement such as the SCD diet to see if this paired with his medications may allow for scaling back his medications.  This may especially be helpful and effective given he is already gluten-free with his celiac disease.    His greatest risk long-term was for colorectal cancer and that treating inflammation to normal or mild decrease the risk of colon cancer.  It would be ideal to continue to monitor him on his current meds, but de-escalating other therapies is certainly reasonable in the future as we continuously reevaluate the risk-benefit profile.    -- Labs: CBC, LFTs, CRP, due  -- Continue weekly Humira  -- Continue azathiorpine 175mg daily   -- Continue mesalamine 4.8g  per day  -- Consider SCD diet    2. PSC  - continue to follow with Dr. Herrera in liver clinic. Due for follow-up.     3. Reported celiac disease  EGD  at [place] does state mucosal changes seen consistent with celiac, however path unavailable. EGD  normal duodenal bxs on gluten free diet.  Celiac serologies normal in [facility] records.  Will have cramping and diarrhea if he accidentally eats gluten.  -- Annual celiac labs    4. Eosinophilic esophagitis  No dysphagia at today's visit.  Unclear if he has EoE or reactive changes related to reflux.  Denies any dysphagia; his father has dysphagia history.  Had > 15 eosinophils/hpf on distal esophageal biopsies 18, but biopsies from mid-esophagus were normal.    -- Does not take PPI  -- Monitor.     5. IBD dysplasia surveillance: Colitis with PSC. Needs an annual dysplasia surveillance   - yearly dysplasia colonoscopy with bx due summer 2024 (ordered)     IBD HISTORY  Age at diagnosis: 23  Extent of disease: Pancolitis   Prior IBD surgeries: None  Prior IBD Medications:  - Vedolizumab (2016 - 2017) stopped due to ongoing inflammation on colonoscopy (not clear if drug/Ab levels were checked)  - Infliximab-developed antibodies (2018)  - Budesonide  - Asacol      DRUG MONITORING  TPMT enzyme activity: 29.4 (18)     6-TGN/6-MMPN levels:   6TG 296, 6MMP 5229     Biologic concentration:  Infliximab 18-drug level 0, antibody greater than 200      HPI:   Here today for a follow-up evaluation.      Clinically he is feeling well. He reports normal stools. 2 stools per day, formed stools. No blood in stools. No EIM.    Woody score:   Stool freq: 0 (baseline stools frequency)  Rectal bleedin (None)  PGA: 0 (normal)  Endoscopy: Images unable for review.     sIBDQ:  IBDQ Score Date IBDQ - Total Score  (Higher score better)   2024   4:57 PM 69   4/10/2023  12:04 PM 64   10/10/2022   2:13 PM 62   2022   6:40 AM 63   2019   1:03 PM 65   2018    5:34 AM 64   8/18/2018   1:51 PM 65      ROS:    Constitutional, HEENT, cardiovascular, pulmonary, GI, , musculoskeletal, neuro, skin, endocrine and psych systems are negative, except as otherwise noted.    PHYSICAL EXAMINATION:  Constitutional: aaox3, cooperative, pleasant, not dyspneic/diaphoretic, no acute distress  Vitals reviewed: There were no vitals taken for this visit.  Wt:   Wt Readings from Last 2 Encounters:   04/18/24 103.9 kg (229 lb)   11/22/23 104.3 kg (230 lb)      Constitutional - general appearance is well and in no acute distress. Body habitus normal  Eyes - No redness or discharge  Respiratory - No cough, unlabored breathing  Musculoskeletal - range of motion intact: Neck and arms  Skin - No discoloration or lesions  Neurological - No tremors, headaches  Psychiatric - No anxiety, alert & oriented     PERTINENT PAST MEDICAL HISTORY:  Past Medical History:   Diagnosis Date    Celiac disease     PSC (primary sclerosing cholangitis) (H28)     Ulcerative colitis (H)        PREVIOUS SURGERIES:  Past Surgical History:   Procedure Laterality Date    COLONOSCOPY N/A 6/5/2019    Procedure: COLONOSCOPY, WITH BIOPSY;  Surgeon: Jasen Watkins MD;  Location: [facility] OR    COLONOSCOPY N/A 6/14/2023    Procedure: COLONOSCOPY, WITH CHROMOENDOSCOPY,   POLYPECTOMY AND BIOPSY;  Surgeon: Jasen Watkins MD;  Location: [facility] OR       ALLERGIES:     Allergies   Allergen Reactions    Fish-Derived Products Hives     Positive skin test to fish and shellfish on 9/14/2016.    Shellfish Allergy Hives, Itching and Shortness Of Breath    Gluten Meal        PERTINENT MEDICATIONS:    Current Outpatient Medications:     adalimumab (HUMIRA *CF* PEN) 40 MG/0.4ML pen kit, Inject 0.4 mLs (40 mg) Subcutaneous every 7 days, Disp: 4 each, Rfl: 2    azaTHIOprine (IMURAN) 50 MG tablet, Take 3.5 tablets (175 mg) by mouth daily, Disp: 315 tablet, Rfl: 1    EPINEPHrine (EPIPEN 2-NAGI) 0.3 MG/0.3ML injection 2-pack, Inject when  needed for emergency treatment of severe allergic reactions (anaphylaxis)., Disp: , Rfl:     mesalamine (LIALDA) 1.2 g DR tablet, Take 4 tablets (4,800 mg) by mouth daily (with breakfast) - Oral, Disp: 120 tablet, Rfl: 5    SOCIAL HISTORY:  Social History     Socioeconomic History    Marital status:      Spouse name: Not on file    Number of children: Not on file    Years of education: Not on file    Highest education level: Not on file   Occupational History    Not on file   Tobacco Use    Smoking status: Never    Smokeless tobacco: Never   Substance and Sexual Activity    Alcohol use: No     Alcohol/week: 0.0 standard drinks of alcohol     Comment: None    Drug use: No    Sexual activity: Not on file   Other Topics Concern    Not on file   Social History Narrative    Not on file     Social Determinants of Health     Financial Resource Strain: Not on file   Food Insecurity: Not on file   Transportation Needs: Not on file   Physical Activity: Not on file   Stress: Not on file   Social Connections: Not on file   Interpersonal Safety: Not on file   Housing Stability: Not on file       FAMILY HISTORY:  Family History   Problem Relation Age of Onset    Hypertension Father     Lupus Paternal Grandmother     Colon Cancer Paternal Grandfather        Past/family/social history reviewed and no changes

## 2024-05-22 NOTE — TELEPHONE ENCOUNTER
Routing refill request to provider for review/approval because:  Drug not on the [facility] refill protocol     Requested Prescriptions   Pending Prescriptions Disp Refills    HUMIRA *CF* PEN 40 MG/0.4ML pen kit [Pharmacy Med Name: HUMIRA(CF) PEN 40 MG/0.4 ML]       Sig: INJECT 40 MG (0.4 ML) UNDER THE SKIN EVERY 7 DAYS       There is no refill protocol information for this order           Radha Loving RN   [facility]

## 2024-05-23 ENCOUNTER — PATIENT OUTREACH (OUTPATIENT)
Dept: GASTROENTEROLOGY | Facility: CLINIC | Age: 34
End: 2024-05-23
Payer: COMMERCIAL

## 2024-05-23 NOTE — TELEPHONE ENCOUNTER
Patient requested labs to be sent to their outside lab.     Clinic:   [facility] in [place], [place]  (O) [phone]  Fax#[phone]     Faxed:   -CBC with Platelets & Differential   -CRP inflammation   -Hepatic function panel      Confirmed receipt to the facility? Yes, via care everywhere.         Katherine Mendoza LPN

## 2024-05-23 NOTE — TELEPHONE ENCOUNTER
[facility] GASTRO CLINIC SUPPORT,  Please fax standing lab orders for Humira ordered by MICHELLE Bergman to     [facility] in [place], [place]  (O) [phone]  Fax#[phone].    Please call to confirm receipt.    Thank you.  Gurmeet

## 2024-05-23 NOTE — TELEPHONE ENCOUNTER
Spoke with Live BURR from our [facility] Team, advice that pt can get his Humira at [facility] at $0 copay.    May 23, 2024    Called Jolly Kruse message and contact info to return call regarding: copay at [facility]

## 2024-05-30 ENCOUNTER — DOCUMENTATION ONLY (OUTPATIENT)
Dept: GASTROENTEROLOGY | Facility: CLINIC | Age: 34
End: 2024-05-30
Payer: COMMERCIAL

## 2024-05-30 NOTE — CONFIDENTIAL NOTE
Action 05.30.2024 RM   Action Taken MRI Abd received from [facility], too to 4n to be uploaded to PACS.

## 2024-06-04 DIAGNOSIS — K51.00 ULCERATIVE PANCOLITIS WITHOUT COMPLICATION (H): ICD-10-CM

## 2024-06-04 RX ORDER — ADALIMUMAB 40MG/0.4ML
40 KIT SUBCUTANEOUS
Qty: 4 EACH | Refills: 4 | OUTPATIENT
Start: 2024-06-04 | End: 2024-06-04

## 2024-06-04 RX ORDER — ADALIMUMAB 40MG/0.4ML
KIT SUBCUTANEOUS
Qty: 4 EACH | Refills: 4 | OUTPATIENT
Start: 2024-06-04

## 2024-06-04 RX ORDER — ADALIMUMAB 40MG/0.4ML
40 KIT SUBCUTANEOUS
Qty: 4 EACH | Refills: 5 | Status: SHIPPED | OUTPATIENT
Start: 2024-06-04

## 2024-06-04 NOTE — TELEPHONE ENCOUNTER
Last provider visit: 2024 with Ronak Bergman PA-C  Next provider visit: 2024 with Dr. Watkins  Last labs completed: 2024  Lab frequency: every 3 months   - standing labs available until 2025  Next labs due: 2024  Last TB screenin2023    Humira refill placed per CPA with Ronak Bergman PA-C.

## 2024-06-04 NOTE — TELEPHONE ENCOUNTER
Rx Refill Request:   Humira 40mg every 7 days  Last Written Prescription Date: 12/19/23  Last Fill Quantity:  4 each,   # refills: 0    Future Office visit: 11/19/24  Last Office Visit :  5/22/24  Dx: Colitis  Current medication:               - adalimumab weekly (started 12/2018, increased to weekly summer 2019)              - Azathioprine  175 mg daily              - Mesalamine 4.8g per day  -- Labs: CBC, LFTs, CRP, due  -- Continue weekly Humira  -- Continue azathiorpine 175mg daily   -- Continue mesalamine 4.8g per day  -- Consider SCD diet    TB: 12/21/23 NEGATIVE    Standing Lab Orders:  CBC, CRP, LFTs  Last complete: 5/10/24  Next due: 8/10/24    Refills sent to [facility] to cover until [facility].  Sent reminder via portal message to ask for new prescription at [facility].

## 2024-06-18 ENCOUNTER — TELEPHONE (OUTPATIENT)
Dept: GASTROENTEROLOGY | Facility: CLINIC | Age: 34
End: 2024-06-18
Payer: COMMERCIAL

## 2024-07-06 ENCOUNTER — HEALTH MAINTENANCE LETTER (OUTPATIENT)
Age: 34
End: 2024-07-06

## 2024-07-30 ENCOUNTER — TELEPHONE (OUTPATIENT)
Dept: GASTROENTEROLOGY | Facility: CLINIC | Age: 34
End: 2024-07-30
Payer: COMMERCIAL

## 2024-07-30 NOTE — TELEPHONE ENCOUNTER
Pre visit planning completed.      Procedure details:    Patient scheduled for Colonoscopy on 8/6/24.     Arrival time: 0940. Procedure time 1040    Facility location: [facility]; [address] [place] [address], [address] Floor, [place] [address]. Check in location: 5th Floor.    Sedation type: MAC    Pre op exam needed? No.    Indication for procedure:   Ulcerative pancolitis without complication (            Chart review:     Electronic implanted devices? No    Recent diagnosis of diverticulitis within the last 6 weeks? No      Medication review:    Diabetic? No    Anticoagulants? No    Weight loss medication/injectable? No GLP 1 medications per patient's medication list.  RN will verify with pre-assessment call.    NSAIDS? No    Other medication HOLDING recommendations:  N/A      Prep for procedure:     Bowel prep recommendation: Standard Miralax  Due to: standard bowel prep.    Prep instructions sent via Mofangyina Merino RN  Endoscopy Procedure Pre Assessment RN  [phone] option 4

## 2024-07-30 NOTE — TELEPHONE ENCOUNTER
Pre assessment completed for upcoming procedure.   (Please see previous telephone encounter notes for complete details)    Procedure details:    Arrival time and facility location reviewed.    Pre op exam needed? No.    Designated  policy reviewed. Instructed to have someone stay 24  hours post procedure.       Medication review:    Medications reviewed. Please see supporting documentation below. Holding recommendations discussed (if applicable).       Prep for procedure:     Patient stated they have already reviewed the bowel prep instructions and writer answered all patient questions (if applicable). NPO instructions reviewed.    Patient was instructed to call if any questions or concerns arise.        Any additional information needed:  N/A      Patient  verbalized understanding and had no questions or concerns at this time.      Deepika Crawford, RN  [phone] option 4

## 2024-08-09 NOTE — PATIENT INSTRUCTIONS
2nd attempt- LVMTCB to schedule tcm appt   Nice to meet you today      Continue on Azathioprine at this time    Check on Infliximab level and will potentially increase the dosage      Colonoscopy this to December  then office visit   You are scheduled on December   Check in time 10 am   [facility]   [address] [place] [address]   You will be mailed the instructions  You will receive a call from the pre assessment nurse one week prior to discuss prep and instructions.     IIncrease Asacol to 6 tablets in am or 3  Am and 3 in pm  until completerthen   Change to  Lialda      For questions regarding your care Monday through Friday, contact the RN GI care coordinator,  Call   [phone] option 3 . Your call will be  returned same day, or if consultation is needed with the provider, it may be following business day - or you may send a My Chart message.    For medication refills (prescribed by the GI clinic), contact your pharmacy.    For appointment rescheduling/cancellation, contact [phone]     After hours, or if you have an immediate GI concern and cannot wait for a return call, contact the GI Fellow at [phone] and select option #4.     Thanks Cheyenne Sams RN Care Coordinator for Dr. Watkins  Phone   [phone]

## 2024-08-16 ENCOUNTER — DOCUMENTATION ONLY (OUTPATIENT)
Dept: GASTROENTEROLOGY | Facility: CLINIC | Age: 34
End: 2024-08-16
Payer: COMMERCIAL

## 2024-08-16 NOTE — PROGRESS NOTES
Per Chun PIPER RN;fax colonoscopy order to [facility]  [address] [address]    Phone:[phone]   Fax:[phone]    Faxed order on 08/16/2024 @11:50 sommer Rodriguez MA

## 2024-09-20 ENCOUNTER — TELEPHONE (OUTPATIENT)
Dept: GASTROENTEROLOGY | Facility: CLINIC | Age: 34
End: 2024-09-20
Payer: COMMERCIAL

## 2024-09-20 NOTE — TELEPHONE ENCOUNTER
Pt is due for follow up with Jordana RIOS     Call placed to pt to initiate scheduling on 9/20    Outcome: Pt is scheduled for a phone visit on 10/14 at 3:30pm

## 2024-09-27 ENCOUNTER — DOCUMENTATION ONLY (OUTPATIENT)
Dept: GASTROENTEROLOGY | Facility: CLINIC | Age: 34
End: 2024-09-27
Payer: COMMERCIAL

## 2024-09-27 NOTE — PROGRESS NOTES
The colonoscopy order from Ronak Bergman (colonoscopy screening conciege) was faxed on 09/27/2024 @ 10:40 a.m     [facility]   [address] [place] [address]   Fax:[phone]     It looks like it was previously faxed but patient reported they did not receive.    Mariana Rodriguez MA

## 2024-10-07 ENCOUNTER — TELEPHONE (OUTPATIENT)
Dept: GASTROENTEROLOGY | Facility: CLINIC | Age: 34
End: 2024-10-07
Payer: COMMERCIAL

## 2024-10-07 NOTE — TELEPHONE ENCOUNTER
Left Voicemail (1st Attempt) and Sent Mychart (1st Attempt) for the patient to call back and schedule the following:    Appointment type: Return IBD  Provider: Dr. Watkins  Return date: Rescheduling needed for 11/19/24 appointment with Dr. Watkins due to a change in providers schedule. Reschedule to next available.  Specialty phone number: [phone]  Additional appointment(s) needed: NA  Additonal Notes: LVM/Jhonny x1

## 2024-10-09 ENCOUNTER — TELEPHONE (OUTPATIENT)
Dept: GASTROENTEROLOGY | Facility: CLINIC | Age: 34
End: 2024-10-09
Payer: COMMERCIAL

## 2024-10-09 NOTE — TELEPHONE ENCOUNTER
Left Voicemail (2nd  Attempt) for the patient to call back and schedule the following:     Appointment type: return   Provider:   Return date: next available  Specialty phone number: [phone]  Additional appointment(s) needed:   Additonal Notes:

## 2024-10-14 ENCOUNTER — VIRTUAL VISIT (OUTPATIENT)
Dept: PHARMACY | Facility: CLINIC | Age: 34
End: 2024-10-14
Attending: PHYSICIAN ASSISTANT
Payer: COMMERCIAL

## 2024-10-14 DIAGNOSIS — K51.00 ULCERATIVE PANCOLITIS WITHOUT COMPLICATION (H): Primary | ICD-10-CM

## 2024-10-14 DIAGNOSIS — K51.00 ULCERATIVE PANCOLITIS WITHOUT COMPLICATION (H): ICD-10-CM

## 2024-10-14 NOTE — NURSING NOTE
Current patient location: [facility] [address] [facility] [address]    Is the patient currently in the state of [place]? YES    Visit mode:TELEPHONE    If the visit is dropped, the patient can be reconnected by: TELEPHONE VISIT: Phone number:   Telephone Information:   Mobile [phone]       Will anyone else be joining the visit? NO  (If patient encounters technical issues they should call [phone])    Are changes needed to the allergy or medication list? Pt stated no changes to allergies and Pt stated no med changes    Are refills needed on medications prescribed by this physician? NO    Rooming Documentation:  Not applicable    Reason for visit: RECHECK    Bossman KOHLER

## 2024-10-14 NOTE — PROGRESS NOTES
Medication Therapy Management (MTM) Encounter    ASSESSMENT:                            Medication Adherence/Access: No issues identified.    Ulcerative Pancolitis: Uriah would benefit from continuing on Humira weekly, azathioprine 150 mg daily, and mesalamine daily for maintenance of clinical remission. Discussed that I would plan to order the actual dose he is taking for azathioprine, versus what is written given he acknowledges he has never really been on 175 mg daily. His metabolites are in a reasonable range for combination therapy, reportedly at the 150 mg daily dose. Encouraged labs at least every 3-6, which are due by November. Encouraged scheduling provider follow-up. Discussed elements of IBD health maintenance as noted below. Provided reminders for vaccines previously discussed. Encouraged him to reach out to us if he would like any assistance coordinating vaccines.    PLAN:                            Reminder to schedule IBD provider follow-up when able.    Will plan on labs by November and then will renew Humira after that.    Azathioprine refilled for 3 tablets daily (150 mg) to align with how you have been taking it.    Uriah to consider the following vaccines:  Seasonal influenza  Prevnar-20  Shingrix series  Hepatitis B series (repeat based on labs from 2023)  -- Please reach out if you would like any assistance coordinating these vaccines.    Follow-up:    -- 6 months with MTM, sooner if needed    SUBJECTIVE/OBJECTIVE:                          Uriah Mortensen is a 34 year old male seen for a follow-up visit.       Reason for visit: Humira check-in    Allergies/ADRs: Reviewed in chart  Tobacco: He reports that he has never smoked. He has never used smokeless tobacco.    Medication Adherence/Access: no issues reported    Ulcerative Pancolitis:   Humira 40 mg subcutaneously weekly   Azathioprine 175 mg daily (taking 3 tablets daily)  Mesalamine 4.8 g daily     Notes he has a colonoscopy scheduled in  . Notes that he really only takes three azathioprine tablets daily since he started it. He said logistically its a burden to split a pill. He believes he was only taking three tablets daily when his last metabolites were drawn. Works with [facility] in [facility] to get labs done.    Colonoscopy 2023  Impression:            - Patchy mild inflammation was found in the ascending                          colon and at the cecum. Chromoscopy performed.                          Biopsied: Random cecal biopsies, random colon at 75,                          and random colon at 70cm. Additional random left colon                          biopsies taken.                          - Focal lesion of inflammation / ulceration was noted                          in the cecum. Biopsied. If dysplastic potentially                          could be removed with EMR through advanced endoscopy.                          - One 3 mm polyp in the transverse colon, removed with                          a cold biopsy forceps. Resected and retrieved.                          - One 3 mm polyp in the sigmoid colon, removed with a                          cold snare. Resected and retrieved. Clips were placed.                          Clip : "[employer].     Last provider visit: 2024 with Ronak Bergman PA-C  Next provider visit: not on file  Last labs completed: 2024  Lab frequency: every 3 months   - standing labs available until 2025  Next labs due: now  Last TB screenin2023  PDC: 59% (Humira)        IBD Health Maintenance    Vaccinations:  All patients on immunosuppression should avoid live vaccines unless specifically indicated.    -- Influenza (every year) recommended yearly  -- TdaP (every 10 years) 2023  -- Pneumococcal Pneumonia    Prevnar-13: not on file   Pneumovax-23: 2016   Prevnar-20: not on file  -- COVID-19 declined    One time confirmation of immunity or  serologies:  -- Hepatitis A (serologies or immunizations) 10/21/2014  -- Hepatitis B (serologies or immunizations) 12/15/2004, 3/3/2005, and 5/4/2005, non-reactive per serologies 9/2023  -- Varicella/Zoster               Varicella history of chickenpox              Zoster not on file     Due to the immunosuppression in this patient, I would not advise administration of live vaccines such as varicella/VZV, intranasal influenza, MMR, or yellow fever vaccine (if traveling).      Immunosuppressive Screening:  -- Hep B Surface Antibody non-reactive 9/2023  -- Hep B Surface Antigen non-reactive 9/2023  -- Hep B Core Antibody non-reactive 9/2023  -- Hep C Antibody not on file  -- Quantiferon negative 12/2023 per CareEverywhere    Cancer Screening:  Colon cancer screening:  Colitis with PSC. Needs an annual dysplasia surveillance   - yearly dysplasia colonoscopy with bx due summer 2024 (previously ordered)    Skin cancer screening: Annual visual exam of skin by dermatologist since patient is immunocompromised    Depression Screening:    PHQ-2 Score:         5/21/2024     4:56 PM 11/22/2023     2:22 PM   PHQ-2 ( 1999 Pfizer)   Q1: Little interest or pleasure in doing things 0 0   Q2: Feeling down, depressed or hopeless 0 0   PHQ-2 Score 0 0   Q1: Little interest or pleasure in doing things Not at all    Q2: Feeling down, depressed or hopeless Not at all    PHQ-2 Score 0      Misc:  -- Avoid tobacco use  -- Avoid NSAIDs as there is potentially a 25% chance of causing an IBD flare    ----------------    I spent 15 minutes with this patient today. All changes were made via collaborative practice agreement with Ronak Bergman. A copy of the visit note was provided to the patient's provider(s).    A summary of these recommendations was sent via [employer].    Jordana Khan PharmD, BCACP  MTM Pharmacist   [facility]   Phone: [phone]    Telemedicine Visit Details  The patient's medications can be  safely assessed via a telemedicine encounter.  Type of service:  Telephone visit  Originating Location (pt. Location): Home    Distant Location (provider location):  Off-site  Start Time:  3:41 PM  End Time: 3:56 PM     Medication Therapy Recommendations  Ulcerative colitis (H)    Current Medication: adalimumab (HUMIRA *CF* PEN) 40 MG/0.4ML pen kit (Discontinued)   Rationale: Preventive therapy - Needs additional medication therapy - Indication   Recommendation: Start Medication - Prevnar 20 0.5 ML Prema   Status: Accepted per CPA

## 2024-10-16 RX ORDER — AZATHIOPRINE 50 MG/1
TABLET ORAL
Qty: 315 TABLET | Refills: 1 | OUTPATIENT
Start: 2024-10-16

## 2024-10-16 RX ORDER — AZATHIOPRINE 50 MG/1
150 TABLET ORAL DAILY
Qty: 90 TABLET | Refills: 0 | Status: SHIPPED | OUTPATIENT
Start: 2024-10-16

## 2024-10-18 NOTE — PATIENT INSTRUCTIONS
"Recommendations from today's MTM visit:                                                       Reminder to schedule IBD provider follow-up when able.    Will plan on labs by November and then will renew Humira after that.    Azathioprine refilled for 3 tablets daily (150 mg) to align with how you have been taking it.    Uriah to consider the following vaccines:  Seasonal influenza  Prevnar-20  Shingrix series  Hepatitis B series (repeat based on labs from 2023)  -- Please reach out if you would like any assistance coordinating these vaccines.    Follow-up:    -- 6 months with MTM, sooner if needed    It was great speaking with you today.  I value your experience and would be very thankful for your time in providing feedback in our clinic survey. In the next few days, you may receive an email or text message from mention with a link to a survey related to your  clinical pharmacist.\"     To schedule another MTM appointment, please call the clinic directly or you may call the MTM scheduling line at [phone].    My Clinical Pharmacist's contact information:                                                      Please feel free to contact me with any questions or concerns you have.      Jordana SandovalD, BCACP  MTM Pharmacist   [facility]   Phone: [phone]    "

## 2024-11-18 DIAGNOSIS — K51.00 ULCERATIVE PANCOLITIS WITHOUT COMPLICATION (H): Primary | ICD-10-CM

## 2024-11-18 RX ORDER — ADALIMUMAB 40MG/0.4ML
40 KIT SUBCUTANEOUS
Qty: 0.8 ML | Refills: 5 | Status: SHIPPED | OUTPATIENT
Start: 2024-11-18

## 2024-11-18 NOTE — TELEPHONE ENCOUNTER
Last provider visit: 2024 with Ronak Bergman PA-C  Next provider visit: 2025 with Ronak Bergman PA-C  Last labs completed: 10/2024  Lab frequency: every 3 months   - standing labs available until 2025  Next labs due: 2025  Last TB screenin2023  PDC: 52% (Humira)    Standing labs renewed and Humira refilled per CPA with Ronak Bergman PA-C.

## 2024-11-19 ENCOUNTER — TELEPHONE (OUTPATIENT)
Dept: GASTROENTEROLOGY | Facility: CLINIC | Age: 34
End: 2024-11-19

## 2024-11-19 NOTE — TELEPHONE ENCOUNTER
----- Message from Jordana Khan sent at 11/18/2024  9:10 AM CST -----  Regarding: Fax Labs  [facility],    I am wondering if someone could fax Uriah's standing labs to [facility] ([place]) where he has been getting labs done.    Thanks!  Jordana

## 2024-11-20 DIAGNOSIS — K51.00 ULCERATIVE PANCOLITIS WITHOUT COMPLICATION (H): ICD-10-CM

## 2024-11-23 NOTE — TELEPHONE ENCOUNTER
azaTHIOprine (IMURAN) 50 MG tablet   Disp-90 tablet, R-0   Start: 10/16/2024     RECENT OUTSIDE LABS AVAILABLE IN THE LAB TAB OR CARE EVERYWHERE.    5/22/2024  [facility] [place]    Ronak Bergman PA-C  Gastroenterology             Routing refill request to provider for review/approval because: med mgmnt to fill this.

## 2024-11-25 RX ORDER — AZATHIOPRINE 50 MG/1
150 TABLET ORAL DAILY
Qty: 270 TABLET | Refills: 1 | Status: SHIPPED | OUTPATIENT
Start: 2024-11-25

## 2024-11-25 NOTE — TELEPHONE ENCOUNTER
Last provider visit: 2024 with Ronak Bergman PA-C  Next provider visit: 2025 with Ronak Bergman PA-C  Last labs completed: 10/2024  Lab frequency: every 3 months              - standing labs available until 2025  Next labs due: 2025  Last TB screenin2023        Azathioprine refilled per CPA with Ronak Bergman PA-C.

## 2025-01-07 ENCOUNTER — TELEPHONE (OUTPATIENT)
Dept: GASTROENTEROLOGY | Facility: CLINIC | Age: 35
End: 2025-01-07
Payer: COMMERCIAL

## 2025-01-07 NOTE — TELEPHONE ENCOUNTER
PA Initiation    Medication: HUMIRA (2 PEN) 40 MG/0.4ML SC AJKT  Insurance Company: [facility] - Phone [phone] Fax [phone]  Pharmacy Filling the Rx: [facility] - [place], [place] - [address]  Filling Pharmacy Phone:    Filling Pharmacy Fax:    Start Date: 1/7/2025  [id number]

## 2025-01-08 NOTE — TELEPHONE ENCOUNTER
Prior Authorization Approval    Medication: HUMIRA (2 PEN) 40 MG/0.4ML SC AJKT  Authorization Effective Date: 12/9/2024  Authorization Expiration Date: 1/8/2026  Approved Dose/Quantity: 4/28  Reference #: [id number]   Insurance Company: [employer] [place] - Phone [phone] Fax [phone]  Expected CoPay: $    CoPay Card Available:      Financial Assistance Needed:    Which Pharmacy is filling the prescription: [facility] - [place], [place] - [address]  Pharmacy Notified:    Patient Notified:

## 2025-01-30 DIAGNOSIS — K51.00 ULCERATIVE PANCOLITIS WITHOUT COMPLICATION (H): ICD-10-CM

## 2025-02-05 DIAGNOSIS — K51.00 ULCERATIVE PANCOLITIS WITHOUT COMPLICATION (H): ICD-10-CM

## 2025-02-05 RX ORDER — MESALAMINE 1.2 G/1
TABLET, DELAYED RELEASE ORAL
Qty: 360 TABLET | Refills: 2 | OUTPATIENT
Start: 2025-02-05

## 2025-02-05 RX ORDER — MESALAMINE 1.2 G/1
TABLET, DELAYED RELEASE ORAL
Qty: 360 TABLET | Refills: 1 | Status: SHIPPED | OUTPATIENT
Start: 2025-02-05

## 2025-02-05 NOTE — TELEPHONE ENCOUNTER
Last Written Prescription:     mesalamine (LIALDA) 1.2 g DR tablet 120 tablet 5 1/15/2024 -- No   Sig: Take 4 tablets (4,800 mg) by mouth daily (with breakfast) - Oral     ----------------------  Last Visit Date: 5/22/24 [place], 10/14/24 [facility]  Future Visit Date: 4/29/25  ----------------------         Refill decision: Medication unable to be refilled by RN due to:        [x]    Review Needed: New med; Med adjusted within <= 30 days; Safety Alert; Lab monitoring required    [x]    Other: MTM follows patient. Labs done 10/29/24 CMP, 10/21/24 CR, LFT    Request from pharmacy:  Requested Prescriptions   Pending Prescriptions Disp Refills    LIALDA 1.2 g DR tablet [Pharmacy Med Name: LIALDA 1.2GM TAB ER] 360 tablet 2     Sig: TAKE FOUR TABLETS BY MOUTH DAILY WITH BREAKFAST

## 2025-02-05 NOTE — TELEPHONE ENCOUNTER
Last provider visit: 5/22/2024 with Ronak Bergman PA-C  Next provider visit: 4/29/2025 with Ronak Bergman PA-C  Last labs completed: 10/2024 (CMP)    Mesalamine refilled per CPA with Ronak Bergman PA-C

## 2025-03-24 ENCOUNTER — TRANSFERRED RECORDS (OUTPATIENT)
Dept: HEALTH INFORMATION MANAGEMENT | Facility: CLINIC | Age: 35
End: 2025-03-24
Payer: COMMERCIAL

## 2025-04-29 ENCOUNTER — VIRTUAL VISIT (OUTPATIENT)
Dept: GASTROENTEROLOGY | Facility: CLINIC | Age: 35
End: 2025-04-29
Attending: PHYSICIAN ASSISTANT
Payer: COMMERCIAL

## 2025-04-29 ENCOUNTER — MYC MEDICAL ADVICE (OUTPATIENT)
Dept: GASTROENTEROLOGY | Facility: CLINIC | Age: 35
End: 2025-04-29

## 2025-04-29 DIAGNOSIS — K90.0 CELIAC DISEASE: ICD-10-CM

## 2025-04-29 DIAGNOSIS — K51.00 ULCERATIVE PANCOLITIS WITHOUT COMPLICATION (H): Primary | ICD-10-CM

## 2025-04-29 NOTE — PROGRESS NOTES
Virtual Visit Details    Type of service:  Telephone Visit   Phone call duration: 10 minutes   Originating Location (pt. Location): Home    Distant Location (provider location):  Off-site  Telephone visit completed due to the patient did not have access to video, while the distant provider did.

## 2025-04-29 NOTE — LETTER
4/29/2025      Uriah Mortensen  [address] [place] [facility] [address]      Dear Colleague,    Thank you for referring your patient, Uriah Mortensen, to the [facility] [place]. Please see a copy of my visit note below.    IBD CLINIC VISIT  CC/REFERRING MD:  Referred Self  REASON FOR CONSULTATION: f/u PSC colitis    ASSESSMENT/PLAN  34 year old man with PSC colitis     1. Colitis  Current medication:    - Adalimumab weekly (started 12/2018, increased to weekly summer 2019)   - Azathioprine  150 mg daily   - Mesalamine 4.8g per day  Current clinical disease activity: remission   Current endoscopic disease activity: 3/2025 UC in remission, bx without active inflammation    He is in clinical and endoscopic remission on weekly adalimumab, azathioprine, mesalamine 4.8 g/day.  He believes that moving to adalimumab weekly have the greatest impact on his symptoms.    We again discussed his treatment regimen and if there might be opportunity to scale back and with recent colonoscopy without active inflammation, I think that is reasonable.   We will get a humira and azathioprine level and pending these findings we can discuss which to pull away first. Many PSC patients respond well to mesalamine, so I hesitate to take that away first, however if both humira and azathioprine levels are adequate, I think it is reasonable to stop the mesalamine. If the azathioprine is subtherapeutic, and humira level is adequate, we could try slowly tapering off of azathioprine.     -- Labs: CBC, LFTs, CRP, overdue, annual creatinine  -- Humira level and AZA level, will determine if anything can be scaled back  -- Continue weekly Humira  -- Continue azathiopurine 150mg daily   -- Continue mesalamine 4.8g per day    2. PSC  - continue to follow with Dr. Herrera in liver clinic. Due for follow-up.     3. Reported celiac disease  EGD 2015 at [place] does state mucosal changes seen consistent with celiac, however  path unavailable. EGD  normal duodenal bxs on gluten free diet.  Celiac serologies normal in [facility] records.  Will have cramping and diarrhea if he accidentally eats gluten.  -- Annual celiac labs    4. Eosinophilic esophagitis  No dysphagia at today's visit.  Unclear if he has EoE or reactive changes related to reflux.  Denies any dysphagia; his father has dysphagia history.  Had > 15 eosinophils/hpf on distal esophageal biopsies 18, but biopsies from mid-esophagus were normal.    -- Does not take PPI  -- Monitor.     5. IBD dysplasia surveillance: Colitis with PSC. Needs an annual dysplasia surveillance   - yearly dysplasia colonoscopy with bx due Spring 2026      IBD HISTORY  Age at diagnosis: 23  Extent of disease: Pancolitis   Prior IBD surgeries: None  Prior IBD Medications:  - Vedolizumab (2016 - 2017) stopped due to ongoing inflammation on colonoscopy (not clear if drug/Ab levels were checked)  - Infliximab-developed antibodies (2018)  - Budesonide  - Asacol      DRUG MONITORING  TPMT enzyme activity: 29.4 (18)     6-TGN/6-MMPN levels:   6TG 296, 6MMP 5229     Biologic concentration:  Infliximab 18-drug level 0, antibody greater than 200    HPI:   Here today for a follow-up evaluation.      Clinically he is feeling well. He reports normal stools. 2 stools per day, formed stools. No blood in stools. No EIM.    Woody score:   Stool freq: 0 (baseline stools frequency)  Rectal bleedin (None)  PGA: 0 (normal)  Endoscopy: Images unable for review.     sIBDQ:  IBDQ Score Date IBDQ - Total Score  (Higher score better)   2025  10:07 AM 69   2024   4:57 PM 69   4/10/2023  12:04 PM 64   10/10/2022   2:13 PM 62   2022   6:40 AM 63   2019   1:03 PM 65   2018   5:34 AM 64      ROS:    Constitutional, HEENT, cardiovascular, pulmonary, GI, , musculoskeletal, neuro, skin, endocrine and psych systems are negative, except as otherwise noted.    PHYSICAL  EXAMINATION:  Constitutional: aaox3, cooperative, pleasant, not dyspneic/diaphoretic, no acute distress  Vitals reviewed: There were no vitals taken for this visit.  Wt:   Wt Readings from Last 2 Encounters:   04/18/24 103.9 kg (229 lb)   11/22/23 104.3 kg (230 lb)      This visit was conducted through a telephone visit. The physical exam was deferred.    PERTINENT PAST MEDICAL HISTORY:  Past Medical History:   Diagnosis Date     Celiac disease      PSC (primary sclerosing cholangitis) (H)      Ulcerative colitis (H)        PREVIOUS SURGERIES:  Past Surgical History:   Procedure Laterality Date     COLONOSCOPY N/A 6/5/2019    Procedure: COLONOSCOPY, WITH BIOPSY;  Surgeon: Jasen Watkins MD;  [facility]     COLONOSCOPY N/A 6/14/2023    Procedure: COLONOSCOPY, WITH CHROMOENDOSCOPY,   POLYPECTOMY AND BIOPSY;  Surgeon: Jasen Watkins MD;  [facility]       ALLERGIES:     Allergies   Allergen Reactions     Fish-Derived Products Hives     Positive skin test to fish and shellfish on 9/14/2016.     Shellfish Allergy Hives, Itching and Shortness Of Breath     Gluten Meal        PERTINENT MEDICATIONS:    Current Outpatient Medications:      Adalimumab (HUMIRA, 2 PEN,) 40 MG/0.4ML pen kit, Inject 0.4 mLs (40 mg) subcutaneously every 7 days., Disp: 0.8 mL, Rfl: 5     azaTHIOprine (IMURAN) 50 MG tablet, Take 3 tablets (150 mg) by mouth daily., Disp: 270 tablet, Rfl: 1     EPINEPHrine (EPIPEN 2-NAGI) 0.3 MG/0.3ML injection 2-pack, Inject when needed for emergency treatment of severe allergic reactions (anaphylaxis)., Disp: , Rfl:      mesalamine (LIALDA) 1.2 g DR tablet, Take 4 tablets (4,800 mg) by mouth daily (with breakfast) - Oral, Disp: 360 tablet, Rfl: 1    SOCIAL HISTORY:  Social History     Socioeconomic History     Marital status:      Spouse name: Not on file     Number of children: Not on file     Years of education: Not on file     Highest education level: Not on file   Occupational  History     Not on file   Tobacco Use     Smoking status: Never     Smokeless tobacco: Never   Substance and Sexual Activity     Alcohol use: No     Alcohol/week: 0.0 standard drinks of alcohol     Comment: None     Drug use: No     Sexual activity: Not on file   Other Topics Concern     Not on file   Social History Narrative     Not on file     Social Drivers of Health     Financial Resource Strain: Not on file   Food Insecurity: Not on file   Transportation Needs: Not on file   Physical Activity: Not on file   Stress: Not on file   Social Connections: Not on file   Interpersonal Safety: Not on file   Housing Stability: Not on file       FAMILY HISTORY:  Family History   Problem Relation Age of Onset     Hypertension Father      Lupus Paternal Grandmother      Colon Cancer Paternal Grandfather        Past/family/social history reviewed and no changes        Virtual Visit Details    Type of service:  Telephone Visit   Phone call duration: 10 minutes   Originating Location (pt. Location): Home    Distant Location (provider location):  Off-site  Telephone visit completed due to the patient did not have access to video, while the distant provider did.      Again, thank you for allowing me to participate in the care of your patient.        Sincerely,        Ronak Bergman PA-C    Electronically signed

## 2025-04-29 NOTE — PATIENT INSTRUCTIONS
It was a pleasure taking care of you today.  I've included a brief summary of our discussion and care plan from today's visit below.  Please review this information with your primary care provider.  ______________________________________________________________________    My recommendations are summarized as follows:    -- Continue humira, azathioprine and mesalamine   -- Labs every 3 months  -- Next endoscopic assessment: Summer 2026  -- Patient with IBD we recommend supplementation vitamin D 1000 units daily and calcium 500 mg twice daily.  -- Vaccines/immunizations to be updated: flu, covid, pneumonia, shingles, tetanus  -- Yearly Dermatology visit for skin check while on immunosuppressive therapy. Can call [phone] to schedule.  -- No NSAIDs (ibuprofen, or anything containing ibuprofen)    For additional resources about inflammatory bowel disease visit http://www.crohnscolitisfoundation.org/    To learn more about Diet and Nutrition in the setting of IBD, check out some of these resources:  https://www.crohnscolitisfoundation.org/diet-and-nutrition/what-should-i-eat  https://www.nimbal.org/  https://ntforibd.org/     Return to GI Clinic in 6 months to review your progress.    ______________________________________________________________________    How do I schedule labs, imaging studies, or procedures that were ordered in clinic today?     Labs: To schedule lab appointment at the [facility], use my chart or call [phone]. If you have a [place] lab closer to home where you are regularly seen you can give them a call.     Procedures: If a colonoscopy, upper endoscopy, breath test, esophageal manometry, or pH impedence was ordered today, our endoscopy team will call you to schedule this. If you have not heard from our endoscopy team within a week, please call [phone] to schedule.     Imaging Studies: If you were scheduled for a CT scan, X-ray, MRI, ultrasound, HIDA scan or other  imaging study, please call [phone] to have this scheduled.     Referral: If a referral to another specialty was ordered, expect a phone call or follow instructions above. If you have not heard from anyone regarding your referral in a week, please call our clinic to check the status.     Who do I call with any questions after my visit?  Please be in touch if there are any further questions that arise following today's visit.  There are multiple ways to contact your gastroenterology care team.      During business hours, you may reach a Gastroenterology nurse at [phone]    To schedule or reschedule an appointment, please call [phone].     You can always send a secure message through [employer].  [employer] messages are answered by your nurse or doctor typically within 24 hours.  Please allow extra time on weekends and holidays.      For urgent/emergent questions after business hours, you may reach the on-call GI Fellow by contacting the [facility]  at [phone].     How will I get the results of any tests ordered?    You will receive all of your results.  If you have signed up for [facility], any tests ordered at your visit will be available to you after your physician reviews them.  Typically this takes 1-2 weeks.  If there are urgent results that require a change in your care plan, your physician or nurse will call you to discuss the next steps.      What is [employer]?  [employer] is a secure way for you to access all of your healthcare records from the [facility].  It is a web based computer program, so you can sign on to it from any location.  It also allows you to send secure messages to your care team.  I recommend signing up for [employer] access if you have not already done so and are comfortable with using a computer.         Sincerely,    Ronak Bergman PA-C  [facility]  Division of Gastroenterology

## 2025-04-29 NOTE — NURSING NOTE
Current patient location: [facility] [address] [facility] [place] [address]    Is the patient currently in the state of [place]? YES    Visit mode: TELEPHONE    If the visit is dropped, the patient can be reconnected by:TELEPHONE VISIT: Phone number: [phone]    Will anyone else be joining the visit? NO  (If patient encounters technical issues they should call [phone]    Are changes needed to the allergy or medication list? No    Are refills needed on medications prescribed by this physician? NO    Rooming Documentation:  Questionnaire(s) completed    Reason for visit: RECHECK    Charan KOHLER

## 2025-04-29 NOTE — PROGRESS NOTES
IBD CLINIC VISIT  CC/REFERRING MD:  Referred Self  REASON FOR CONSULTATION: f/u PSC colitis    ASSESSMENT/PLAN  34 year old man with PSC colitis     1. Colitis  Current medication:    - Adalimumab weekly (started 12/2018, increased to weekly summer 2019)   - Azathioprine  150 mg daily   - Mesalamine 4.8g per day  Current clinical disease activity: remission   Current endoscopic disease activity: 3/2025 UC in remission, bx without active inflammation    He is in clinical and endoscopic remission on weekly adalimumab, azathioprine, mesalamine 4.8 g/day.  He believes that moving to adalimumab weekly have the greatest impact on his symptoms.    We again discussed his treatment regimen and if there might be opportunity to scale back and with recent colonoscopy without active inflammation, I think that is reasonable.   We will get a humira and azathioprine level and pending these findings we can discuss which to pull away first. Many PSC patients respond well to mesalamine, so I hesitate to take that away first, however if both humira and azathioprine levels are adequate, I think it is reasonable to stop the mesalamine. If the azathioprine is subtherapeutic, and humira level is adequate, we could try slowly tapering off of azathioprine.     -- Labs: CBC, LFTs, CRP, overdue, annual creatinine  -- Humira level and AZA level, will determine if anything can be scaled back  -- Continue weekly Humira  -- Continue azathiopurine 150mg daily   -- Continue mesalamine 4.8g per day    2. PSC  - continue to follow with Dr. Herrera in liver clinic. Due for follow-up.     3. Reported celiac disease  EGD 2015 at [place] does state mucosal changes seen consistent with celiac, however path unavailable. EGD 2018 normal duodenal bxs on gluten free diet.  Celiac serologies normal in [facility] records.  Will have cramping and diarrhea if he accidentally eats gluten.  -- Annual celiac labs    4. Eosinophilic esophagitis  No dysphagia at today's visit.   Unclear if he has EoE or reactive changes related to reflux.  Denies any dysphagia; his father has dysphagia history.  Had > 15 eosinophils/hpf on distal esophageal biopsies 18, but biopsies from mid-esophagus were normal.    -- Does not take PPI  -- Monitor.     5. IBD dysplasia surveillance: Colitis with PSC. Needs an annual dysplasia surveillance   - yearly dysplasia colonoscopy with bx due Spring 2026      IBD HISTORY  Age at diagnosis: 23  Extent of disease: Pancolitis   Prior IBD surgeries: None  Prior IBD Medications:  - Vedolizumab (2016 - 2017) stopped due to ongoing inflammation on colonoscopy (not clear if drug/Ab levels were checked)  - Infliximab-developed antibodies (2018)  - Budesonide  - Asacol      DRUG MONITORING  TPMT enzyme activity: 29.4 (18)     6-TGN/6-MMPN levels:   6TG 296, 6MMP 5229     Biologic concentration:  Infliximab 18-drug level 0, antibody greater than 200    HPI:   Here today for a follow-up evaluation.      Clinically he is feeling well. He reports normal stools. 2 stools per day, formed stools. No blood in stools. No EIM.    Woody score:   Stool freq: 0 (baseline stools frequency)  Rectal bleedin (None)  PGA: 0 (normal)  Endoscopy: Images unable for review.     sIBDQ:  IBDQ Score Date IBDQ - Total Score  (Higher score better)   2025  10:07 AM 69   2024   4:57 PM 69   4/10/2023  12:04 PM 64   10/10/2022   2:13 PM 62   2022   6:40 AM 63   2019   1:03 PM 65   2018   5:34 AM 64      ROS:    Constitutional, HEENT, cardiovascular, pulmonary, GI, , musculoskeletal, neuro, skin, endocrine and psych systems are negative, except as otherwise noted.    PHYSICAL EXAMINATION:  Constitutional: aaox3, cooperative, pleasant, not dyspneic/diaphoretic, no acute distress  Vitals reviewed: There were no vitals taken for this visit.  Wt:   Wt Readings from Last 2 Encounters:   24 103.9 kg (229 lb)   23 104.3 kg (230 lb)      This visit  was conducted through a telephone visit. The physical exam was deferred.    PERTINENT PAST MEDICAL HISTORY:  Past Medical History:   Diagnosis Date    Celiac disease     PSC (primary sclerosing cholangitis) (H)     Ulcerative colitis (H)        PREVIOUS SURGERIES:  Past Surgical History:   Procedure Laterality Date    COLONOSCOPY N/A 6/5/2019    Procedure: COLONOSCOPY, WITH BIOPSY;  Surgeon: Jasen Watkins MD;  [facility]    COLONOSCOPY N/A 6/14/2023    Procedure: COLONOSCOPY, WITH CHROMOENDOSCOPY,   POLYPECTOMY AND BIOPSY;  Surgeon: Jasen Watkins MD;  Location: [facility] OR       ALLERGIES:     Allergies   Allergen Reactions    Fish-Derived Products Hives     Positive skin test to fish and shellfish on 9/14/2016.    Shellfish Allergy Hives, Itching and Shortness Of Breath    Gluten Meal        PERTINENT MEDICATIONS:    Current Outpatient Medications:     Adalimumab (HUMIRA, 2 PEN,) 40 MG/0.4ML pen kit, Inject 0.4 mLs (40 mg) subcutaneously every 7 days., Disp: 0.8 mL, Rfl: 5    azaTHIOprine (IMURAN) 50 MG tablet, Take 3 tablets (150 mg) by mouth daily., Disp: 270 tablet, Rfl: 1    EPINEPHrine (EPIPEN 2-NAGI) 0.3 MG/0.3ML injection 2-pack, Inject when needed for emergency treatment of severe allergic reactions (anaphylaxis)., Disp: , Rfl:     mesalamine (LIALDA) 1.2 g DR tablet, Take 4 tablets (4,800 mg) by mouth daily (with breakfast) - Oral, Disp: 360 tablet, Rfl: 1    SOCIAL HISTORY:  Social History     Socioeconomic History    Marital status:      Spouse name: Not on file    Number of children: Not on file    Years of education: Not on file    Highest education level: Not on file   Occupational History    Not on file   Tobacco Use    Smoking status: Never    Smokeless tobacco: Never   Substance and Sexual Activity    Alcohol use: No     Alcohol/week: 0.0 standard drinks of alcohol     Comment: None    Drug use: No    Sexual activity: Not on file   Other Topics Concern    Not on file   Social  History Narrative    Not on file     Social Drivers of Health     Financial Resource Strain: Not on file   Food Insecurity: Not on file   Transportation Needs: Not on file   Physical Activity: Not on file   Stress: Not on file   Social Connections: Not on file   Interpersonal Safety: Not on file   Housing Stability: Not on file       FAMILY HISTORY:  Family History   Problem Relation Age of Onset    Hypertension Father     Lupus Paternal Grandmother     Colon Cancer Paternal Grandfather        Past/family/social history reviewed and no changes

## 2025-05-07 NOTE — PROGRESS NOTES
Lab results from  9/14 and   9/28  [facility] received and scanned into the record.    
,DirectAddress_Unknown

## 2025-05-12 ENCOUNTER — RESULTS FOLLOW-UP (OUTPATIENT)
Dept: GASTROENTEROLOGY | Facility: CLINIC | Age: 35
End: 2025-05-12

## 2025-05-14 NOTE — RESULT ENCOUNTER NOTE
Maksim Kruse,    Your levels of azathiopurine are appropriate. No additional changes at this time.      Ronak Bergman PA-C  Division of Gastroenterology, Hepatology and Nutrition  [facility]     6TG 284  6MMPN 2452  He will continue with humira weekly and aza 150 mg daily. He can discontinue mesalamine.

## 2025-07-02 ENCOUNTER — VIRTUAL VISIT (OUTPATIENT)
Dept: PHARMACY | Facility: CLINIC | Age: 35
End: 2025-07-02
Attending: PHYSICIAN ASSISTANT
Payer: COMMERCIAL

## 2025-07-02 VITALS — BODY MASS INDEX: 27.98 KG/M2 | HEIGHT: 75 IN | WEIGHT: 225 LBS

## 2025-07-02 DIAGNOSIS — K51.00 ULCERATIVE PANCOLITIS WITHOUT COMPLICATION (H): ICD-10-CM

## 2025-07-02 RX ORDER — ADALIMUMAB 40MG/0.4ML
40 KIT SUBCUTANEOUS
Qty: 1.6 ML | Refills: 5 | Status: CANCELLED | OUTPATIENT
Start: 2025-07-02

## 2025-07-02 ASSESSMENT — PAIN SCALES - GENERAL: PAINLEVEL_OUTOF10: NO PAIN (0)

## 2025-07-02 NOTE — NURSING NOTE
Patient reviewed medications and allergies in Mychart during e-check in and said everything looked correct.      Current patient location: [facility] [address] [facility] [address]    Is the patient currently in the state of [place]? YES    Visit mode: TELEPHONE    If the visit is dropped, the patient can be reconnected by:TELEPHONE VISIT: Phone number:   Telephone Information:   Mobile [phone]       Will anyone else be joining the visit? NO  (If patient encounters technical issues they should call [phone]    Are changes needed to the allergy or medication list? No    Are refills needed on medications prescribed by this physician? YES    Rooming Documentation:  Not applicable    Reason for visit: URBAN Pelayo F

## 2025-07-02 NOTE — PATIENT INSTRUCTIONS
"Recommendations from today's MTM visit:                                                      Continue on Humira and azathioprine  You are due for your annual tuberculosis screening. This has been ordered. This can be done with your next set of maintenance labs.  Uriah to consider the following vaccines:  Prevnar-20  Shingrix series  Hepatitis B series (repeat based on labs from 2023)  -- Please reach out if you would like any assistance coordinating these vaccines.    Follow-up: 6 months, or sooner as needed    It was great speaking with you today.  I value your experience and would be very thankful for your time in providing feedback in our clinic survey. In the next few days, you may receive an email or text message from [facility] [employer] with a link to a survey related to your  clinical pharmacist.\"     To schedule another MTM appointment, please call the clinic directly or you may call the MTM scheduling line at [phone].    My Clinical Pharmacist's contact information:                                                      Please feel free to contact me with any questions or concerns you have.      Coco SandovalD, BCPS  MTM Pharmacist   [facility]   Phone: [phone]     "

## 2025-07-02 NOTE — PROGRESS NOTES
Medication Therapy Management (MTM) Encounter    ASSESSMENT:                            Medication Adherence/Access: No issues identified.    Ulcerative Colitis:  Uriah would benefit from continued treatment with Humira and azathioprine. He is up to date on routine maintenance labs. He is due for annual tuberculosis screening. No access issues for his advanced therapy are present. He is indicated for a few vaccinations which were recommended to him.     PLAN:                            Continue on Humira and azathioprine  You are due for your annual tuberculosis screening. This has been ordered. This can be done with your next set of maintenance labs.  Uriah to consider the following vaccines:  Prevnar-20  Shingrix series  Hepatitis B series (repeat based on labs from 2023)  -- Please reach out if you would like any assistance coordinating these vaccines.    Follow-up: 6 months, or sooner as needed    SUBJECTIVE/OBJECTIVE:                          Uriah Mortensen is a 35 year old male seen for a follow-up visit.       Reason for visit: Humira check-in.  - Humira refilled 6/2025    Allergies/ADRs: Reviewed in chart  Past Medical History: Reviewed in chart  Tobacco: He reports that he has never smoked. He has never used smokeless tobacco.    Medication Adherence/Access: no issues reported.    Ulcerative Colitis:   Humira 40 mg subcutaneous every 7 days  Azathioprine 150 mg daily    Uriah is currently in clinical and endoscopic remission on weekly adalimumab and azathioprine. He was instructed to stop mesalamine after recent Humira and AZA levels returned in goal. He has been off for about a month and he has done well with this. He continues to report symptoms are well controlled on Humira, especially in regards to exposure from cross contamination with gluten.     Last provider visit: 4/29/2025 AXEL Bergman  Next provider visit: not scheduled  Last labs completed: 5/6/2025  Lab frequency: every 3 months               - standing labs available until 2025  Next labs due: 2025  Last TB screenin2023  PDC: 67%    Current endoscopic disease activity: 3/2025 UC in remission, bx without active inflammation     Adalimumab level:  Lab Results   Component Value Date    ADACON 16.0 2025         PRO-2 for Ulcerative Colitis    Please select the one best answer for the patient's ability at this time     How would you rate your stool frequency over the last 3 days?    Normal (+0)    How would you rate the severity of your rectal bleeding over the last 3 days?    None: 0 points    3. Over the last week, how would you rate your general well-being?    Generally Well    Score: 0 (do not include question 3 in scoring)  0: Inactive Disease  1-1.5: Remission  6: Active disease and spontaneous bleeding    IBD Health Maintenance     Vaccinations:  All patients on immunosuppression should avoid live vaccines unless specifically indicated.    -- Influenza (every year) recommended yearly  -- TdaP (every 10 years) 2023  -- Pneumococcal Pneumonia               Prevnar-13: not on file              Pneumovax-23: 2016              Prevnar-20: not on file  -- COVID-19 declined     One time confirmation of immunity or serologies:  -- Hepatitis A (serologies or immunizations) 10/21/2014  -- Hepatitis B (serologies or immunizations) 12/15/2004, 3/3/2005, and 2005, non-reactive per serologies 2023  -- Varicella/Zoster               Varicella history of chickenpox              Zoster not on file      Due to the immunosuppression in this patient, I would not advise administration of live vaccines such as varicella/VZV, intranasal influenza, MMR, or yellow fever vaccine (if traveling).    ----------------    I spent 8 minutes with this patient today. All changes were made via collaborative practice agreement with Ronak Bergman.     A summary of these recommendations was sent via [employer].    Coco Sanchez PharmD, BCPS  MTM  [facility]   Phone: [phone]    Telemedicine Visit Details  The patient's medications can be safely assessed via a telemedicine encounter.  Type of service:  Telephone visit  Originating Location (pt. Location): Home    Distant Location (provider location):  Off-site  Start Time: 2:00 PM  End Time: 2:08 PM     Medication Therapy Recommendations  No medication therapy recommendations to display

## 2025-07-13 ENCOUNTER — HEALTH MAINTENANCE LETTER (OUTPATIENT)
Age: 35
End: 2025-07-13

## (undated) DEVICE — CLIP HEMOCLIP ENDOSCOPIC INSTINCT 2.8X230CM INSC-7-230-SS

## (undated) DEVICE — TUBING SUCTION MEDI-VAC 1/4"X20' N620A

## (undated) DEVICE — SUCTION MANIFOLD NEPTUNE 2 SYS 1 PORT 702-025-000

## (undated) DEVICE — SNARE CAPIVATOR ROUND COLD SNR BX10 M00561101

## (undated) DEVICE — ENDO SNARE EXACTO COLD 9MM LOOP 2.4MMX230CM 00711115

## (undated) DEVICE — KIT ENDO TURNOVER/PROCEDURE CARRY-ON 101822

## (undated) DEVICE — ENDO FORCEP BX CAPTURA PRO SPIKE G50696

## (undated) DEVICE — ENDO TRAP POLYP E-TRAP 00711099

## (undated) DEVICE — KIT ENDO FIRST STEP DISINFECTANT 200ML W/POUCH EP-4

## (undated) DEVICE — TUBING SUCTION 12"X1/4" N612

## (undated) DEVICE — SPECIMEN CONTAINER 3OZ W/FORMALIN 59901

## (undated) DEVICE — SOL WATER IRRIG 1000ML BOTTLE 2F7114

## (undated) DEVICE — GOWN IMPERVIOUS 2XL BLUE

## (undated) DEVICE — SOL WATER IRRIG 500ML BOTTLE 2F7113

## (undated) DEVICE — FCP BIOPSY RADIAL JAW 4 JUMBO 3.2MM CHANNEL M00513370

## (undated) RX ORDER — ONDANSETRON 2 MG/ML
INJECTION INTRAMUSCULAR; INTRAVENOUS
Status: DISPENSED
Start: 2019-06-05

## (undated) RX ORDER — FENTANYL CITRATE 50 UG/ML
INJECTION, SOLUTION INTRAMUSCULAR; INTRAVENOUS
Status: DISPENSED
Start: 2019-06-05